# Patient Record
Sex: MALE | Race: WHITE | Employment: FULL TIME | URBAN - METROPOLITAN AREA
[De-identification: names, ages, dates, MRNs, and addresses within clinical notes are randomized per-mention and may not be internally consistent; named-entity substitution may affect disease eponyms.]

---

## 2018-07-11 ENCOUNTER — OFFICE VISIT (OUTPATIENT)
Dept: FAMILY MEDICINE CLINIC | Age: 48
End: 2018-07-11
Payer: COMMERCIAL

## 2018-07-11 ENCOUNTER — HOSPITAL ENCOUNTER (OUTPATIENT)
Age: 48
Setting detail: SPECIMEN
Discharge: HOME | End: 2018-07-11
Payer: COMMERCIAL

## 2018-07-11 VITALS
RESPIRATION RATE: 16 BRPM | TEMPERATURE: 98 F | WEIGHT: 255 LBS | DIASTOLIC BLOOD PRESSURE: 88 MMHG | HEIGHT: 70 IN | HEART RATE: 87 BPM | BODY MASS INDEX: 36.51 KG/M2 | SYSTOLIC BLOOD PRESSURE: 133 MMHG

## 2018-07-11 DIAGNOSIS — R63.4 WEIGHT LOSS: ICD-10-CM

## 2018-07-11 DIAGNOSIS — Z12.11 ENCOUNTER FOR FIT (FECAL IMMUNOCHEMICAL TEST) SCREENING: ICD-10-CM

## 2018-07-11 DIAGNOSIS — E11.8 TYPE 2 DIABETES MELLITUS WITH COMPLICATION, WITHOUT LONG-TERM CURRENT USE OF INSULIN (HCC): ICD-10-CM

## 2018-07-11 DIAGNOSIS — Z13.220 LIPID SCREENING: Primary | ICD-10-CM

## 2018-07-11 DIAGNOSIS — Z12.11 ENCOUNTER FOR SCREENING COLONOSCOPY: ICD-10-CM

## 2018-07-11 DIAGNOSIS — R42 DIZZINESS: ICD-10-CM

## 2018-07-11 DIAGNOSIS — R07.9 CHEST PAIN, UNSPECIFIED TYPE: ICD-10-CM

## 2018-07-11 LAB
BILIRUBIN, POC: NEGATIVE
BLOOD URINE, POC: NEGATIVE
CLARITY, POC: ABNORMAL
COLOR, POC: YELLOW
CREATININE URINE: 73.4 MG/DL (ref 39–259)
GLUCOSE URINE, POC: 500
HBA1C MFR BLD: 13.3 %
KETONES, POC: NEGATIVE
LEUKOCYTE EST, POC: NEGATIVE
MICROALBUMIN/CREAT 24H UR: <12 MG/L
MICROALBUMIN/CREAT UR-RTO: NORMAL MCG/MG CREAT
NITRITE, POC: NEGATIVE
PH, POC: 5.5
PROTEIN, POC: NEGATIVE
SPECIFIC GRAVITY, POC: 1.02
UROBILINOGEN, POC: 0.2

## 2018-07-11 PROCEDURE — 99205 OFFICE O/P NEW HI 60 MIN: CPT | Performed by: PHYSICIAN ASSISTANT

## 2018-07-11 PROCEDURE — 83036 HEMOGLOBIN GLYCOSYLATED A1C: CPT | Performed by: PHYSICIAN ASSISTANT

## 2018-07-11 PROCEDURE — 81002 URINALYSIS NONAUTO W/O SCOPE: CPT | Performed by: PHYSICIAN ASSISTANT

## 2018-07-11 PROCEDURE — G0444 DEPRESSION SCREEN ANNUAL: HCPCS | Performed by: PHYSICIAN ASSISTANT

## 2018-07-11 PROCEDURE — 93000 ELECTROCARDIOGRAM COMPLETE: CPT | Performed by: PHYSICIAN ASSISTANT

## 2018-07-11 RX ORDER — METFORMIN HYDROCHLORIDE 500 MG/1
500 TABLET, EXTENDED RELEASE ORAL
Qty: 60 TABLET | Refills: 11 | Status: SHIPPED | OUTPATIENT
Start: 2018-07-11 | End: 2018-10-15 | Stop reason: SDUPTHER

## 2018-07-11 RX ORDER — LISINOPRIL 10 MG/1
10 TABLET ORAL DAILY
Qty: 30 TABLET | Refills: 0 | Status: SHIPPED | OUTPATIENT
Start: 2018-07-11 | End: 2018-08-13 | Stop reason: SDUPTHER

## 2018-07-11 RX ORDER — ATORVASTATIN CALCIUM 10 MG/1
10 TABLET, FILM COATED ORAL DAILY
Qty: 30 TABLET | Refills: 3 | Status: SHIPPED | OUTPATIENT
Start: 2018-07-11 | End: 2018-10-15 | Stop reason: SDUPTHER

## 2018-07-11 ASSESSMENT — PATIENT HEALTH QUESTIONNAIRE - PHQ9
8. MOVING OR SPEAKING SO SLOWLY THAT OTHER PEOPLE COULD HAVE NOTICED. OR THE OPPOSITE, BEING SO FIGETY OR RESTLESS THAT YOU HAVE BEEN MOVING AROUND A LOT MORE THAN USUAL: 0
10. IF YOU CHECKED OFF ANY PROBLEMS, HOW DIFFICULT HAVE THESE PROBLEMS MADE IT FOR YOU TO DO YOUR WORK, TAKE CARE OF THINGS AT HOME, OR GET ALONG WITH OTHER PEOPLE: 0
SUM OF ALL RESPONSES TO PHQ QUESTIONS 1-9: 5
2. FEELING DOWN, DEPRESSED OR HOPELESS: 1
6. FEELING BAD ABOUT YOURSELF - OR THAT YOU ARE A FAILURE OR HAVE LET YOURSELF OR YOUR FAMILY DOWN: 0
4. FEELING TIRED OR HAVING LITTLE ENERGY: 0
9. THOUGHTS THAT YOU WOULD BE BETTER OFF DEAD, OR OF HURTING YOURSELF: 0
1. LITTLE INTEREST OR PLEASURE IN DOING THINGS: 1
3. TROUBLE FALLING OR STAYING ASLEEP: 1
7. TROUBLE CONCENTRATING ON THINGS, SUCH AS READING THE NEWSPAPER OR WATCHING TELEVISION: 1
SUM OF ALL RESPONSES TO PHQ9 QUESTIONS 1 & 2: 2
5. POOR APPETITE OR OVEREATING: 1

## 2018-07-12 ENCOUNTER — HOSPITAL ENCOUNTER (OUTPATIENT)
Dept: NUCLEAR MEDICINE | Age: 48
Discharge: HOME | End: 2018-07-14
Payer: COMMERCIAL

## 2018-07-12 ENCOUNTER — HOSPITAL ENCOUNTER (OUTPATIENT)
Dept: NON INVASIVE DIAGNOSTICS | Age: 48
Discharge: HOME | End: 2018-07-12
Payer: COMMERCIAL

## 2018-07-12 ENCOUNTER — HOSPITAL ENCOUNTER (OUTPATIENT)
Age: 48
Discharge: HOME | End: 2018-07-12
Payer: COMMERCIAL

## 2018-07-12 VITALS — DIASTOLIC BLOOD PRESSURE: 87 MMHG | SYSTOLIC BLOOD PRESSURE: 133 MMHG | RESPIRATION RATE: 16 BRPM | HEART RATE: 87 BPM

## 2018-07-12 DIAGNOSIS — E11.8 TYPE 2 DIABETES MELLITUS WITH COMPLICATION, WITHOUT LONG-TERM CURRENT USE OF INSULIN (HCC): ICD-10-CM

## 2018-07-12 DIAGNOSIS — R42 DIZZINESS: ICD-10-CM

## 2018-07-12 DIAGNOSIS — Z13.220 LIPID SCREENING: ICD-10-CM

## 2018-07-12 DIAGNOSIS — R63.4 WEIGHT LOSS: ICD-10-CM

## 2018-07-12 DIAGNOSIS — R07.9 CHEST PAIN, UNSPECIFIED TYPE: ICD-10-CM

## 2018-07-12 LAB
ALBUMIN SERPL-MCNC: 3.8 G/DL (ref 3.5–5.2)
ALBUMIN/GLOBULIN RATIO: ABNORMAL (ref 1–2.5)
ALP BLD-CCNC: 91 U/L (ref 40–129)
ALT SERPL-CCNC: 34 U/L (ref 5–41)
ANION GAP SERPL CALCULATED.3IONS-SCNC: 11 MMOL/L (ref 9–17)
AST SERPL-CCNC: 21 U/L
BILIRUB SERPL-MCNC: 0.62 MG/DL (ref 0.3–1.2)
BUN BLDV-MCNC: 10 MG/DL (ref 6–20)
BUN/CREAT BLD: 13 (ref 9–20)
CALCIUM SERPL-MCNC: 8.8 MG/DL (ref 8.6–10.4)
CHLORIDE BLD-SCNC: 100 MMOL/L (ref 98–107)
CHOLESTEROL/HDL RATIO: 7
CHOLESTEROL: 223 MG/DL
CO2: 26 MMOL/L (ref 20–31)
CREAT SERPL-MCNC: 0.78 MG/DL (ref 0.7–1.2)
GFR AFRICAN AMERICAN: >60 ML/MIN
GFR NON-AFRICAN AMERICAN: >60 ML/MIN
GFR SERPL CREATININE-BSD FRML MDRD: ABNORMAL ML/MIN/{1.73_M2}
GFR SERPL CREATININE-BSD FRML MDRD: ABNORMAL ML/MIN/{1.73_M2}
GLUCOSE BLD-MCNC: 300 MG/DL (ref 70–99)
HCT VFR BLD CALC: 42.6 % (ref 41–53)
HDLC SERPL-MCNC: 32 MG/DL
HEMOGLOBIN: 14.5 G/DL (ref 13.5–17.5)
LDL CHOLESTEROL: 119 MG/DL (ref 0–130)
MCH RBC QN AUTO: 33.3 PG (ref 26–34)
MCHC RBC AUTO-ENTMCNC: 34 G/DL (ref 31–37)
MCV RBC AUTO: 98 FL (ref 80–100)
NRBC AUTOMATED: ABNORMAL PER 100 WBC
PDW BLD-RTO: 12.6 % (ref 11.5–14.5)
PLATELET # BLD: 210 K/UL (ref 130–400)
PMV BLD AUTO: 9.6 FL (ref 6–12)
POTASSIUM SERPL-SCNC: 4.6 MMOL/L (ref 3.7–5.3)
RBC # BLD: 4.35 M/UL (ref 4.5–5.9)
SODIUM BLD-SCNC: 137 MMOL/L (ref 135–144)
TOTAL PROTEIN: 7.2 G/DL (ref 6.4–8.3)
TRIGL SERPL-MCNC: 360 MG/DL
TSH SERPL DL<=0.05 MIU/L-ACNC: 1.49 MIU/L (ref 0.3–5)
VLDLC SERPL CALC-MCNC: ABNORMAL MG/DL (ref 1–30)
WBC # BLD: 10.2 K/UL (ref 3.5–11)

## 2018-07-12 PROCEDURE — 93017 CV STRESS TEST TRACING ONLY: CPT

## 2018-07-12 PROCEDURE — 80053 COMPREHEN METABOLIC PANEL: CPT

## 2018-07-12 PROCEDURE — 78452 HT MUSCLE IMAGE SPECT MULT: CPT

## 2018-07-12 PROCEDURE — 84443 ASSAY THYROID STIM HORMONE: CPT

## 2018-07-12 PROCEDURE — 36415 COLL VENOUS BLD VENIPUNCTURE: CPT

## 2018-07-12 PROCEDURE — 3430000000 HC RX DIAGNOSTIC RADIOPHARMACEUTICAL: Performed by: PHYSICIAN ASSISTANT

## 2018-07-12 PROCEDURE — 6360000002 HC RX W HCPCS: Performed by: PHYSICIAN ASSISTANT

## 2018-07-12 PROCEDURE — 85027 COMPLETE CBC AUTOMATED: CPT

## 2018-07-12 PROCEDURE — 2580000003 HC RX 258: Performed by: PHYSICIAN ASSISTANT

## 2018-07-12 PROCEDURE — A9500 TC99M SESTAMIBI: HCPCS | Performed by: PHYSICIAN ASSISTANT

## 2018-07-12 PROCEDURE — 80061 LIPID PANEL: CPT

## 2018-07-12 RX ORDER — AMINOPHYLLINE DIHYDRATE 25 MG/ML
100 INJECTION, SOLUTION INTRAVENOUS
Status: ACTIVE | OUTPATIENT
Start: 2018-07-12 | End: 2018-07-12

## 2018-07-12 RX ORDER — SODIUM CHLORIDE 0.9 % (FLUSH) 0.9 %
10 SYRINGE (ML) INJECTION PRN
Status: DISCONTINUED | OUTPATIENT
Start: 2018-07-12 | End: 2018-07-13 | Stop reason: HOSPADM

## 2018-07-12 RX ORDER — 0.9 % SODIUM CHLORIDE 0.9 %
250 INTRAVENOUS SOLUTION INTRAVENOUS ONCE
Status: DISCONTINUED | OUTPATIENT
Start: 2018-07-12 | End: 2018-07-13 | Stop reason: HOSPADM

## 2018-07-12 RX ORDER — METOPROLOL TARTRATE 5 MG/5ML
2.5 INJECTION INTRAVENOUS PRN
Status: DISCONTINUED | OUTPATIENT
Start: 2018-07-12 | End: 2018-07-13 | Stop reason: HOSPADM

## 2018-07-12 RX ORDER — NITROGLYCERIN 0.4 MG/1
0.4 TABLET SUBLINGUAL EVERY 5 MIN PRN
Status: DISCONTINUED | OUTPATIENT
Start: 2018-07-12 | End: 2018-07-13 | Stop reason: HOSPADM

## 2018-07-12 RX ADMIN — TETRAKIS(2-METHOXYISOBUTYLISOCYANIDE)COPPER(I) TETRAFLUOROBORATE 33.6 MILLICURIE: 1 INJECTION, POWDER, LYOPHILIZED, FOR SOLUTION INTRAVENOUS at 08:30

## 2018-07-12 RX ADMIN — REGADENOSON 0.4 MG: 0.08 INJECTION, SOLUTION INTRAVENOUS at 08:28

## 2018-07-12 RX ADMIN — Medication 10 ML: at 08:29

## 2018-07-12 NOTE — PROCEDURES
[address]                   [address] [facility], [facility]0 [facility]                                CARDIAC STRESS TEST    PATIENT NAME: Camacho Noguera                  :        1970  MED REC NO:   [id number]                             ROOM:  ACCOUNT NO:   [de-identified]                           ADMIT DATE: 2018  PROVIDER:     Amy Pollard    DATE OF STUDY:  2018    PRIMARY CARE PROVIDER:  DEVYN Monzon    PERFORMING PHYSICIAN: Amy Pollard MD    [facility] STRESS TEST    INDICATION:  Chest pain    100% max predicted heart rate:  170%  85% max predicted heart rate:  145%  Resting heart rate:  86  Maximum heart rate achieved:  110  Duration:  1:00  Resting BP:  133/87  Peak BP:  133/87  Peak Double Product: N/A  % of Age Predicted Maximum:  64%  METS:  1.0  Medications Given: 0.4 mg Lexiscan  Reason for Termination:  Medication Infusion Complete. During the stress the patient reported:  No symptoms. Baseline EKG demonstrated:  Sinus rhythm. Stress EKG Changes Demonstrated:  No abnormal change. ECG IMPRESSION:  Negative.         KIMBERLEY MILNER    D: 2018 11:57:40       T: 2018 12:06:48     SA/DEB_EDIT  Job#: [id number]     Doc#: Unknown

## 2018-07-13 ENCOUNTER — HOSPITAL ENCOUNTER (OUTPATIENT)
Dept: NON INVASIVE DIAGNOSTICS | Age: 48
Discharge: HOME | End: 2018-07-13
Payer: COMMERCIAL

## 2018-07-13 DIAGNOSIS — R07.9 CHEST PAIN, UNSPECIFIED TYPE: ICD-10-CM

## 2018-07-13 DIAGNOSIS — R42 DIZZINESS: ICD-10-CM

## 2018-07-13 LAB
LV EF: 46 %
LV EF: 53 %
LVEF MODALITY: NORMAL
LVEF MODALITY: NORMAL

## 2018-07-13 PROCEDURE — 93306 TTE W/DOPPLER COMPLETE: CPT

## 2018-07-13 PROCEDURE — 3430000000 HC RX DIAGNOSTIC RADIOPHARMACEUTICAL: Performed by: PHYSICIAN ASSISTANT

## 2018-07-13 PROCEDURE — A9500 TC99M SESTAMIBI: HCPCS | Performed by: PHYSICIAN ASSISTANT

## 2018-07-13 RX ORDER — GLUCOSAMINE HCL/CHONDROITIN SU 500-400 MG
CAPSULE ORAL
Qty: 100 STRIP | Refills: 0 | Status: SHIPPED | OUTPATIENT
Start: 2018-07-13 | End: 2018-10-15

## 2018-07-13 RX ORDER — LANCETS 30 GAUGE
EACH MISCELLANEOUS
Qty: 100 EACH | Refills: 0 | Status: SHIPPED | OUTPATIENT
Start: 2018-07-13 | End: 2018-10-15

## 2018-07-13 RX ORDER — GLIPIZIDE 5 MG/1
5 TABLET ORAL 2 TIMES DAILY
Qty: 60 TABLET | Refills: 3 | Status: SHIPPED | OUTPATIENT
Start: 2018-07-13 | End: 2018-10-15 | Stop reason: SDUPTHER

## 2018-07-13 RX ADMIN — TETRAKIS(2-METHOXYISOBUTYLISOCYANIDE)COPPER(I) TETRAFLUOROBORATE 31 MILLICURIE: 1 INJECTION, POWDER, LYOPHILIZED, FOR SOLUTION INTRAVENOUS at 09:00

## 2018-07-13 ASSESSMENT — ENCOUNTER SYMPTOMS
NAUSEA: 0
CONSTIPATION: 0
ABDOMINAL PAIN: 0
BLOOD IN STOOL: 0
CHOKING: 0
VOMITING: 0
ABDOMINAL DISTENTION: 0
EYE REDNESS: 0
STRIDOR: 0
SINUS PRESSURE: 0
PHOTOPHOBIA: 0
BACK PAIN: 0
EYE PAIN: 0
SHORTNESS OF BREATH: 1
WHEEZING: 0
CHEST TIGHTNESS: 0
COLOR CHANGE: 0
DIARRHEA: 0
COUGH: 1
APNEA: 0
SORE THROAT: 0

## 2018-07-13 NOTE — PROGRESS NOTES
TONSILLECTOMY         ______________________________________________________________________  Health Maintenance Due   Topic Date Due    Diabetic foot exam  01/26/1978    Diabetic retinal exam  01/26/1978    HIV screen  01/26/1983    DTaP/Tdap/Td vaccine (1 - Tdap) 01/26/1987    Pneumococcal med risk (1 of 1 - PPSV23) 01/26/1987    Shingles Vaccine (1 of 2 - 2 Dose Series) 01/26/2018    Colon cancer screen colonoscopy  01/26/2018       Allergies   Allergen Reactions    Toradol [Ketorolac Tromethamine] Shortness Of Breath         Current Outpatient Prescriptions   Medication Sig Dispense Refill    Lancets MISC Use one Lancet per blood sugar test 100 each 0    blood glucose monitor strips Test 3 times a day & as needed for symptoms of irregular blood glucose. 100 strip 0    glipiZIDE (GLUCOTROL) 5 MG tablet Take 1 tablet by mouth 2 times daily 60 tablet 3    lisinopril (PRINIVIL) 10 MG tablet Take 1 tablet by mouth daily for 30 doses 30 tablet 0    metFORMIN (GLUCOPHAGE-XR) 500 MG extended release tablet Take 1 tablet by mouth 2 times daily (before meals) Take 500 mg a day for 2 weeks, then increase the dose to twice a day. 60 tablet 11    atorvastatin (LIPITOR) 10 MG tablet Take 1 tablet by mouth daily 30 tablet 3     No current facility-administered medications for this visit. Social History   Substance Use Topics    Smoking status: Current Every Day Smoker     Packs/day: 1.00     Types: Cigarettes    Smokeless tobacco: Former User    Alcohol use Yes       History reviewed. No pertinent family history. ______________________________________________________________________  Review of Systems   Constitutional: Positive for fatigue (Generalized fatigue without, muscle weakness) and unexpected weight change (Approximately 60 pounds over the last year without effort). Negative for activity change, appetite change, chills, diaphoresis and fever. HENT: Negative.   Negative for congestion, hearing loss, sinus pressure and sore throat. Eyes: Negative for photophobia, pain, redness and visual disturbance. Respiratory: Positive for cough (Chronic productive cough in the morning of green-brown sputum) and shortness of breath. Negative for apnea, choking, chest tightness, wheezing and stridor. Cardiovascular: Positive for chest pain ( intermittent chest pressure). Negative for palpitations and leg swelling. Gastrointestinal: Negative for abdominal distention, abdominal pain, blood in stool, constipation, diarrhea, nausea and vomiting. Endocrine: Negative. Genitourinary: Negative for decreased urine volume, difficulty urinating, dysuria, flank pain, frequency, hematuria and urgency. Musculoskeletal: Negative for back pain and gait problem. Skin: Negative for color change, pallor, rash and wound. Neurological: Positive for dizziness (Intermittent episodes of dizziness without syncopal episode) and headaches (Intermittent generalized headaches without focal deficits). Negative for tremors, syncope, facial asymmetry, speech difficulty, weakness, light-headedness and numbness. Hematological: Negative for adenopathy. Psychiatric/Behavioral: Negative for agitation, confusion and sleep disturbance. The patient is not nervous/anxious. ______________________________________________________________________  Physical Exam   Constitutional: He is oriented to person, place, and time. Vital signs are normal. He appears well-developed and well-nourished. No distress. HENT:   Head: Normocephalic and atraumatic. Right Ear: External ear normal.   Left Ear: External ear normal.   Nose: Nose normal.   Mouth/Throat: Oropharynx is clear and moist. No oropharyngeal exudate. Eyes: Conjunctivae and EOM are normal. Right eye exhibits no discharge. Left eye exhibits no discharge. No scleral icterus. Neck: Normal range of motion. Neck supple. No JVD present. No tracheal deviation present.  No thyromegaly performed in visit on 07/11/18   POCT glycosylated hemoglobin (Hb A1C)   Result Value Ref Range    Hemoglobin A1C 13.3 %   POCT Urinalysis no Micro   Result Value Ref Range    Color, UA Yellow     Clarity, UA Slightly Cloudy     Glucose, UA      Bilirubin, UA Negative     Ketones, UA Negative     Spec Grav, UA 1.020     Blood, UA POC Negative     pH, UA 5.5     Protein, UA POC Negative     Urobilinogen, UA 0.2     Leukocytes, UA Negative     Nitrite, UA Negative        ______________________________________________________________________  Assessment and Plan:  Barry Doan was seen today for establish care and diabetes. Diagnoses and all orders for this visit:    Lipid screening  -     Lipid Panel; Future  -     Cancel: Stress Test W Pharm; Future  -     Echocardiogram complete; Future  -     [address]; Future    Encounter for FIT (fecal immunochemical test) screening  -     POCT Fecal Immunochemical Test (FIT); Future  -     Kishor Jordan MD, [facility]*    Chest pain, unspecified type  -     Lipid Panel; Future  -     POCT glycosylated hemoglobin (Hb A1C)  -     EKG 12 lead  -     CBC; Future  -     Comprehensive Metabolic Panel; Future  -     Cancel: Stress Test W Pharm; Future  -     Echocardiogram complete; Future  -     [facility] [address] [place] [address]; Future    Type 2 diabetes mellitus with complication, without long-term current use of insulin (HCC)  -     POCT Fecal Immunochemical Test (FIT); Future  -     Lipid Panel; Future  -     POCT glycosylated hemoglobin (Hb A1C)  -     POCT Urinalysis no Micro  -     Microalbumin, Ur  -     CBC; Future  -     Comprehensive Metabolic Panel; Future  -     Cancel: Stress Test W Pharm; Future  -     Echocardiogram complete; Future  -     [address];  Future  -     DME Order for Glucometer as ANDREA Casillas [facility]  7/13/2018, 11:49 AM    This note is created with the assistance of a speech-recognition program. While intending to generate a document that actually reflects the content of the visit, the document can still have some mistakes which may not have been identified and corrected by editing.

## 2018-07-16 ENCOUNTER — TELEPHONE (OUTPATIENT)
Dept: FAMILY MEDICINE CLINIC | Age: 48
End: 2018-07-16

## 2018-07-20 ENCOUNTER — TELEPHONE (OUTPATIENT)
Dept: FAMILY MEDICINE CLINIC | Age: 48
End: 2018-07-20

## 2018-07-20 NOTE — TELEPHONE ENCOUNTER
Patient called in with Blood sugar readings all Morning readings. Please advise thank you! 7/16/18    219   7/17/18    193  7/18/18    151  7/19/18    258    7/20/18    134      Next Visit Date:    Last ov 7/11/18    No future appointments. Health Maintenance   Topic Date Due    Diabetic foot exam  01/26/1978    Diabetic retinal exam  01/26/1978    HIV screen  01/26/1983    DTaP/Tdap/Td vaccine (1 - Tdap) 01/26/1987    Pneumococcal med risk (1 of 1 - PPSV23) 01/26/1987    Shingles Vaccine (1 of 2 - 2 Dose Series) 01/26/2018    Colon cancer screen colonoscopy  01/26/2018    Flu vaccine (1) 09/01/2018    A1C test (Diabetic or Prediabetic)  10/11/2018    Diabetic microalbuminuria test  07/11/2019    Lipid screen  07/12/2019    Potassium monitoring  07/12/2019    Creatinine monitoring  07/12/2019       Hemoglobin A1C (%)   Date Value   07/11/2018 13.3             ( goal A1C is < 7)   Microalb/Crt.  Ratio (mcg/mg creat)   Date Value   07/11/2018 CANNOT BE CALCULATED     LDL Cholesterol (mg/dL)   Date Value   07/12/2018 119       (goal LDL is <100)   AST (U/L)   Date Value   07/12/2018 21     ALT (U/L)   Date Value   07/12/2018 34     BUN (mg/dL)   Date Value   07/12/2018 10     BP Readings from Last 3 Encounters:   07/12/18 133/87   07/11/18 133/88          (goal 120/80)    All Future Testing planned in [facility]  Lab Frequency Next Occurrence   POCT Fecal Immunochemical Test (FIT) Once 07/25/2018   Stress Test W Pharm Once 07/11/2018               Patient Active Problem List:     Type 2 diabetes mellitus with complication, without long-term current use of insulin (HCC)     Chest pain     Lipid screening

## 2018-08-10 PROBLEM — Z13.220 LIPID SCREENING: Status: RESOLVED | Noted: 2018-07-11 | Resolved: 2018-08-10

## 2018-08-13 RX ORDER — LISINOPRIL 10 MG/1
10 TABLET ORAL DAILY
Qty: 30 TABLET | Refills: 0 | Status: SHIPPED | OUTPATIENT
Start: 2018-08-13 | End: 2018-09-11 | Stop reason: SDUPTHER

## 2018-08-15 ENCOUNTER — TELEPHONE (OUTPATIENT)
Dept: FAMILY MEDICINE CLINIC | Age: 48
End: 2018-08-15

## 2018-08-15 DIAGNOSIS — E11.8 TYPE 2 DIABETES MELLITUS WITH COMPLICATION, WITHOUT LONG-TERM CURRENT USE OF INSULIN (HCC): ICD-10-CM

## 2018-08-15 DIAGNOSIS — Z12.11 ENCOUNTER FOR FIT (FECAL IMMUNOCHEMICAL TEST) SCREENING: ICD-10-CM

## 2018-08-15 DIAGNOSIS — R19.5 POSITIVE FIT (FECAL IMMUNOCHEMICAL TEST): Primary | ICD-10-CM

## 2018-08-15 LAB
CONTROL: PRESENT
HEMOCCULT STL QL: POSITIVE

## 2018-08-15 PROCEDURE — 82274 ASSAY TEST FOR BLOOD FECAL: CPT | Performed by: PHYSICIAN ASSISTANT

## 2018-09-11 NOTE — TELEPHONE ENCOUNTER
Received [facility] request for refill of patient's  medication. Medication pended for physician review, please advise. Thank you! Last visit:7/11/2018  Last Med refill:8/13/2018    Next Visit Date:  Future Appointments  Date Time Provider John Gordon   10/15/2018 10:00 AM ANDREA [facility] [address] Maintenance   Topic Date Due    Diabetic foot exam  01/26/1978    Diabetic retinal exam  01/26/1978    HIV screen  01/26/1983    DTaP/Tdap/Td vaccine (1 - Tdap) 01/26/1987    Pneumococcal med risk (1 of 1 - PPSV23) 01/26/1987    Shingles Vaccine (1 of 2 - 2 Dose Series) 01/26/2018    Flu vaccine (1) 09/01/2018    A1C test (Diabetic or Prediabetic)  10/11/2018    Diabetic microalbuminuria test  07/11/2019    Lipid screen  07/12/2019    Potassium monitoring  07/12/2019    Creatinine monitoring  07/12/2019    Colon Cancer Screen FIT/FOBT  08/15/2019       Hemoglobin A1C (%)   Date Value   07/11/2018 13.3             ( goal A1C is < 7)   Microalb/Crt.  Ratio (mcg/mg creat)   Date Value   07/11/2018 CANNOT BE CALCULATED     LDL Cholesterol (mg/dL)   Date Value   07/12/2018 119       (goal LDL is <100)   AST (U/L)   Date Value   07/12/2018 21     ALT (U/L)   Date Value   07/12/2018 34     BUN (mg/dL)   Date Value   07/12/2018 10     BP Readings from Last 3 Encounters:   07/12/18 133/87   07/11/18 133/88          (goal 120/80)    All Future Testing planned in [facility]  Lab Frequency Next Occurrence   Stress Test W Pharm Once 07/11/2018               Patient Active Problem List:     Type 2 diabetes mellitus with complication, without long-term current use of insulin (HCC)     Chest pain

## 2018-09-12 RX ORDER — LISINOPRIL 10 MG/1
TABLET ORAL
Qty: 30 TABLET | Refills: 0 | Status: SHIPPED | OUTPATIENT
Start: 2018-09-12 | End: 2018-10-15 | Stop reason: SDUPTHER

## 2018-10-15 ENCOUNTER — OFFICE VISIT (OUTPATIENT)
Dept: FAMILY MEDICINE CLINIC | Age: 48
End: 2018-10-15
Payer: COMMERCIAL

## 2018-10-15 VITALS
RESPIRATION RATE: 16 BRPM | TEMPERATURE: 98.1 F | WEIGHT: 252.8 LBS | BODY MASS INDEX: 36.19 KG/M2 | HEIGHT: 70 IN | SYSTOLIC BLOOD PRESSURE: 115 MMHG | DIASTOLIC BLOOD PRESSURE: 73 MMHG | HEART RATE: 92 BPM

## 2018-10-15 DIAGNOSIS — I10 ESSENTIAL HYPERTENSION: ICD-10-CM

## 2018-10-15 DIAGNOSIS — E11.8 TYPE 2 DIABETES MELLITUS WITH COMPLICATION, WITHOUT LONG-TERM CURRENT USE OF INSULIN (HCC): Primary | ICD-10-CM

## 2018-10-15 DIAGNOSIS — E78.1 HYPERTRIGLYCERIDEMIA: ICD-10-CM

## 2018-10-15 LAB — HBA1C MFR BLD: 6.2 %

## 2018-10-15 PROCEDURE — 83036 HEMOGLOBIN GLYCOSYLATED A1C: CPT | Performed by: PHYSICIAN ASSISTANT

## 2018-10-15 PROCEDURE — 99214 OFFICE O/P EST MOD 30 MIN: CPT | Performed by: PHYSICIAN ASSISTANT

## 2018-10-15 RX ORDER — LISINOPRIL 10 MG/1
TABLET ORAL
Qty: 30 TABLET | Refills: 3 | Status: SHIPPED | OUTPATIENT
Start: 2018-10-15 | End: 2019-01-28 | Stop reason: SDUPTHER

## 2018-10-15 RX ORDER — GLIPIZIDE 5 MG/1
5 TABLET ORAL 2 TIMES DAILY
Qty: 60 TABLET | Refills: 3 | Status: SHIPPED | OUTPATIENT
Start: 2018-10-15 | End: 2019-01-28 | Stop reason: SDUPTHER

## 2018-10-15 RX ORDER — METFORMIN HYDROCHLORIDE 500 MG/1
500 TABLET, EXTENDED RELEASE ORAL
Qty: 60 TABLET | Refills: 11 | Status: SHIPPED | OUTPATIENT
Start: 2018-10-15 | End: 2019-01-28 | Stop reason: SDUPTHER

## 2018-10-15 RX ORDER — ATORVASTATIN CALCIUM 10 MG/1
10 TABLET, FILM COATED ORAL DAILY
Qty: 30 TABLET | Refills: 3 | Status: SHIPPED | OUTPATIENT
Start: 2018-10-15 | End: 2019-01-28 | Stop reason: SDUPTHER

## 2018-10-15 ASSESSMENT — ENCOUNTER SYMPTOMS
COLOR CHANGE: 0
SINUS PRESSURE: 0
ABDOMINAL PAIN: 0
CONSTIPATION: 0
SHORTNESS OF BREATH: 0
VOMITING: 0
NAUSEA: 0
SORE THROAT: 0
BACK PAIN: 0
WHEEZING: 0
EYE REDNESS: 0
BLOOD IN STOOL: 0
DIARRHEA: 0
COUGH: 0
CHEST TIGHTNESS: 0
ABDOMINAL DISTENTION: 0
PHOTOPHOBIA: 0

## 2018-10-15 NOTE — PROGRESS NOTES
Potassium monitoring  07/12/2019    Creatinine monitoring  07/12/2019    Colon Cancer Screen FIT/FOBT  08/15/2019

## 2019-01-28 ENCOUNTER — OFFICE VISIT (OUTPATIENT)
Dept: FAMILY MEDICINE CLINIC | Age: 49
End: 2019-01-28
Payer: COMMERCIAL

## 2019-01-28 VITALS
HEART RATE: 90 BPM | BODY MASS INDEX: 38.14 KG/M2 | TEMPERATURE: 98 F | WEIGHT: 266.4 LBS | HEIGHT: 70 IN | SYSTOLIC BLOOD PRESSURE: 114 MMHG | DIASTOLIC BLOOD PRESSURE: 73 MMHG | RESPIRATION RATE: 16 BRPM

## 2019-01-28 DIAGNOSIS — E11.8 TYPE 2 DIABETES MELLITUS WITH COMPLICATION, WITHOUT LONG-TERM CURRENT USE OF INSULIN (HCC): Primary | ICD-10-CM

## 2019-01-28 DIAGNOSIS — I10 ESSENTIAL HYPERTENSION: ICD-10-CM

## 2019-01-28 DIAGNOSIS — E78.1 HYPERTRIGLYCERIDEMIA: ICD-10-CM

## 2019-01-28 DIAGNOSIS — Z12.11 ENCOUNTER FOR SCREENING COLONOSCOPY: ICD-10-CM

## 2019-01-28 PROCEDURE — 99214 OFFICE O/P EST MOD 30 MIN: CPT | Performed by: PHYSICIAN ASSISTANT

## 2019-01-28 RX ORDER — ATORVASTATIN CALCIUM 10 MG/1
10 TABLET, FILM COATED ORAL DAILY
Qty: 30 TABLET | Refills: 2 | Status: SHIPPED | OUTPATIENT
Start: 2019-01-28 | End: 2019-06-15 | Stop reason: SDUPTHER

## 2019-01-28 RX ORDER — GLIPIZIDE 5 MG/1
5 TABLET ORAL 2 TIMES DAILY
Qty: 60 TABLET | Refills: 2 | Status: SHIPPED | OUTPATIENT
Start: 2019-01-28 | End: 2019-06-15 | Stop reason: SDUPTHER

## 2019-01-28 RX ORDER — METFORMIN HYDROCHLORIDE 500 MG/1
500 TABLET, EXTENDED RELEASE ORAL
Qty: 60 TABLET | Refills: 2 | Status: SHIPPED | OUTPATIENT
Start: 2019-01-28 | End: 2019-06-28 | Stop reason: SDUPTHER

## 2019-01-28 RX ORDER — LISINOPRIL 10 MG/1
TABLET ORAL
Qty: 30 TABLET | Refills: 2 | Status: SHIPPED | OUTPATIENT
Start: 2019-01-28 | End: 2019-06-15 | Stop reason: SDUPTHER

## 2019-01-28 ASSESSMENT — ENCOUNTER SYMPTOMS
EYE REDNESS: 0
BACK PAIN: 0
CONSTIPATION: 0
COLOR CHANGE: 0
SHORTNESS OF BREATH: 0
CHEST TIGHTNESS: 0
COUGH: 0
RECTAL PAIN: 0
ABDOMINAL PAIN: 0
VOMITING: 0
SORE THROAT: 0
SINUS PRESSURE: 0
ANAL BLEEDING: 0
ABDOMINAL DISTENTION: 0
WHEEZING: 0
NAUSEA: 0
BLOOD IN STOOL: 0
PHOTOPHOBIA: 0
DIARRHEA: 0

## 2019-05-24 ENCOUNTER — OFFICE VISIT (OUTPATIENT)
Dept: FAMILY MEDICINE CLINIC | Age: 49
End: 2019-05-24
Payer: COMMERCIAL

## 2019-05-24 VITALS
TEMPERATURE: 97.9 F | RESPIRATION RATE: 16 BRPM | DIASTOLIC BLOOD PRESSURE: 83 MMHG | HEART RATE: 87 BPM | WEIGHT: 273.4 LBS | BODY MASS INDEX: 39.14 KG/M2 | SYSTOLIC BLOOD PRESSURE: 127 MMHG | HEIGHT: 70 IN

## 2019-05-24 DIAGNOSIS — Z13.220 LIPID SCREENING: ICD-10-CM

## 2019-05-24 DIAGNOSIS — E78.1 HYPERTRIGLYCERIDEMIA: ICD-10-CM

## 2019-05-24 DIAGNOSIS — Z23 NEED FOR HEPATITIS B BOOSTER VACCINATION: ICD-10-CM

## 2019-05-24 DIAGNOSIS — R63.5 WEIGHT GAIN: ICD-10-CM

## 2019-05-24 DIAGNOSIS — I10 ESSENTIAL HYPERTENSION: ICD-10-CM

## 2019-05-24 DIAGNOSIS — Z23 NEED FOR 23-POLYVALENT PNEUMOCOCCAL POLYSACCHARIDE VACCINE: ICD-10-CM

## 2019-05-24 DIAGNOSIS — K21.9 GASTROESOPHAGEAL REFLUX DISEASE, ESOPHAGITIS PRESENCE NOT SPECIFIED: ICD-10-CM

## 2019-05-24 DIAGNOSIS — R19.5 POSITIVE FIT (FECAL IMMUNOCHEMICAL TEST): ICD-10-CM

## 2019-05-24 DIAGNOSIS — E11.8 TYPE 2 DIABETES MELLITUS WITH COMPLICATION, WITHOUT LONG-TERM CURRENT USE OF INSULIN (HCC): Primary | ICD-10-CM

## 2019-05-24 LAB — HBA1C MFR BLD: 6.3 %

## 2019-05-24 PROCEDURE — 90472 IMMUNIZATION ADMIN EACH ADD: CPT | Performed by: PHYSICIAN ASSISTANT

## 2019-05-24 PROCEDURE — 99214 OFFICE O/P EST MOD 30 MIN: CPT | Performed by: PHYSICIAN ASSISTANT

## 2019-05-24 PROCEDURE — 90746 HEPB VACCINE 3 DOSE ADULT IM: CPT | Performed by: PHYSICIAN ASSISTANT

## 2019-05-24 PROCEDURE — 90471 IMMUNIZATION ADMIN: CPT | Performed by: PHYSICIAN ASSISTANT

## 2019-05-24 PROCEDURE — 90732 PPSV23 VACC 2 YRS+ SUBQ/IM: CPT | Performed by: PHYSICIAN ASSISTANT

## 2019-05-24 PROCEDURE — 83036 HEMOGLOBIN GLYCOSYLATED A1C: CPT | Performed by: PHYSICIAN ASSISTANT

## 2019-05-24 RX ORDER — FENOFIBRATE 160 MG/1
160 TABLET ORAL DAILY
Qty: 90 TABLET | Refills: 1 | Status: SHIPPED | OUTPATIENT
Start: 2019-05-24 | End: 2019-09-05 | Stop reason: SDUPTHER

## 2019-05-24 RX ORDER — FAMOTIDINE 20 MG/1
20 TABLET, FILM COATED ORAL 2 TIMES DAILY
Qty: 60 TABLET | Refills: 3 | Status: SHIPPED | OUTPATIENT
Start: 2019-05-24 | End: 2019-11-26 | Stop reason: SDUPTHER

## 2019-05-24 ASSESSMENT — PATIENT HEALTH QUESTIONNAIRE - PHQ9
SUM OF ALL RESPONSES TO PHQ9 QUESTIONS 1 & 2: 0
2. FEELING DOWN, DEPRESSED OR HOPELESS: 0
SUM OF ALL RESPONSES TO PHQ QUESTIONS 1-9: 0
SUM OF ALL RESPONSES TO PHQ QUESTIONS 1-9: 0
1. LITTLE INTEREST OR PLEASURE IN DOING THINGS: 0

## 2019-05-24 NOTE — PROGRESS NOTES
Chronic Disease Visit Information    BP Readings from Last 3 Encounters:   01/28/19 114/73   10/15/18 115/73   07/12/18 133/87          Hemoglobin A1C (%)   Date Value   10/15/2018 6.2   07/11/2018 13.3     Microalb/Crt. Ratio (mcg/mg creat)   Date Value   07/11/2018 CANNOT BE CALCULATED     LDL Cholesterol (mg/dL)   Date Value   07/12/2018 119     HDL (mg/dL)   Date Value   07/12/2018 32 (L)     BUN (mg/dL)   Date Value   07/12/2018 10     CREATININE (mg/dL)   Date Value   07/12/2018 0.78     Glucose (mg/dL)   Date Value   07/12/2018 300 (H)            Have you changed or started any medications since your last visit including any over-the-counter medicines, vitamins, or herbal medicines? no   Are you having any side effects from any of your medications? -  no  Have you stopped taking any of your medications? Is so, why? -  no    Have you seen any other physician or provider since your last visit? No  Have you had any other diagnostic tests since your last visit? No  Have you been seen in the emergency room and/or had an admission to a hospital since we last saw you? No  Have you had your annual diabetic retinal (eye) exam? No  Have you had your routine dental cleaning in the past 6 months? no    Have you activated your [employer] account? If not, what are your barriers?  No:      Patient Care Team:  Clem Preston PA-C as PCP - General (Physician Assistant)         Medical History Review  Past Medical, Family, and Social History reviewed and does contribute to the patient presenting condition    Health Maintenance   Topic Date Due    Pneumococcal 0-64 years Vaccine (1 of 1 - PPSV23) 01/26/1974    Diabetic foot exam  01/26/1978    Diabetic retinal exam  01/26/1978    HIV screen  01/26/1983    Hepatitis B Vaccine (1 of 3 - Risk 3-dose series) 01/26/1987    DTaP/Tdap/Td vaccine (1 - Tdap) 02/05/2020 (Originally 1/26/1987)    Shingles Vaccine (1 of 2) 02/05/2020 (Originally 1/26/2018)    Flu vaccine (Season Ended) 02/12/2020 (Originally 9/1/2019)    Diabetic microalbuminuria test  07/11/2019    Lipid screen  07/12/2019    Potassium monitoring  07/12/2019    Creatinine monitoring  07/12/2019    Colon Cancer Screen FIT/FOBT  08/15/2019    A1C test (Diabetic or Prediabetic)  10/15/2019

## 2019-05-28 ASSESSMENT — ENCOUNTER SYMPTOMS
VOMITING: 0
CONSTIPATION: 0
ANAL BLEEDING: 0
COUGH: 0
DIARRHEA: 0
SHORTNESS OF BREATH: 0
WHEEZING: 0
ABDOMINAL PAIN: 0
BACK PAIN: 0
NAUSEA: 0
SINUS PAIN: 0
SORE THROAT: 0
CHEST TIGHTNESS: 0
BLOOD IN STOOL: 0
ABDOMINAL DISTENTION: 0

## 2019-05-28 NOTE — PROGRESS NOTES
[address] PRIMARY CARE  [address] [address]  Dept: [phone]  Dept Fax: [phone]    Office Progress Note  Date of patient's visit: 5/28/2019  Patient's Name:  Adriel Lance YOB: 1970            LUKE SHEPARD  ================================================================    REASON FOR VISIT/CHIEF COMPLAINT:  3 Month Follow-Up; Hypertension; and Diabetes    HISTORY OF PRESENTING ILLNESS:  History was obtained from: patient. Adriel Lance is a 44 y.o. is here for follow up for diabetes, hypertension,  Hypertriglyceridemia, GERD. Patient states overall he is doing well. He denies any new concerns or complaints. Patient is due for screening labs as well as colonoscopy. he has no new complaints today. He is due for medication refills.     Patient Active Problem List   Diagnosis    Type 2 diabetes mellitus with complication, without long-term current use of insulin ([facility] [address]    Chest pain    Essential hypertension    Hypertriglyceridemia       Health Maintenance Due   Topic Date Due    Pneumococcal 0-64 years Vaccine (1 of 1 - PPSV23) 01/26/1974    Diabetic foot exam  01/26/1978    Diabetic retinal exam  01/26/1978    HIV screen  01/26/1983    Hepatitis B Vaccine (1 of 3 - Risk 3-dose series) 01/26/1987       Allergies   Allergen Reactions    Toradol [Ketorolac Tromethamine] Shortness Of Breath         Current Outpatient Medications   Medication Sig Dispense Refill    fenofibrate 160 MG tablet Take 1 tablet by mouth daily 90 tablet 1    famotidine (PEPCID) 20 MG tablet Take 1 tablet by mouth 2 times daily 60 tablet 3    lisinopril (PRINIVIL;ZESTRIL) 10 MG tablet TAKE 1 TABLET BY MOUTH ONCE DAILY FOR 30 DOSES 30 tablet 2    glipiZIDE (GLUCOTROL) 5 MG tablet Take 1 tablet by mouth 2 times daily 60 tablet 2    metFORMIN (GLUCOPHAGE-XR) 500 MG extended release tablet Take 1 tablet by mouth 2 times daily (before meals) Take 500 mg a day for 2 weeks, then increase the dose to twice a day. 60 tablet 2    atorvastatin (LIPITOR) 10 MG tablet Take 1 tablet by mouth daily 30 tablet 2     No current facility-administered medications for this visit. Social History     Tobacco Use    Smoking status: Current Every Day Smoker     Packs/day: 1.00     Types: Cigarettes    Smokeless tobacco: Former User   Substance Use Topics    Alcohol use: Yes    Drug use: No       No family history on file. Review of Systems   Constitutional: Negative for appetite change, chills, diaphoresis, fatigue, fever and unexpected weight change. HENT: Negative for sinus pain and sore throat. Eyes: Negative for visual disturbance. Respiratory: Negative for cough, chest tightness, shortness of breath and wheezing. Cardiovascular: Negative for chest pain, palpitations and leg swelling. Gastrointestinal: Negative for abdominal distention, abdominal pain, anal bleeding, blood in stool, constipation, diarrhea, nausea and vomiting. Genitourinary: Negative for dysuria, frequency and urgency. Musculoskeletal: Negative for arthralgias, back pain, gait problem and myalgias. Neurological: Negative for dizziness, syncope, weakness and headaches. Psychiatric/Behavioral: Negative for agitation and sleep disturbance. The patient is not nervous/anxious. Physical Exam   Constitutional: He is oriented to person, place, and time. Vital signs are normal. He appears well-developed and well-nourished. Non-toxic appearance. He does not have a sickly appearance. He does not appear ill. No distress. HENT:   Head: Normocephalic and atraumatic. Right Ear: External ear normal.   Left Ear: External ear normal.   Nose: Nose normal.   Mouth/Throat: Oropharynx is clear and moist. No oropharyngeal exudate. Eyes: Pupils are equal, round, and reactive to light. Conjunctivae and EOM are normal. Right eye exhibits no discharge.  Left eye exhibits no discharge. No scleral icterus. Neck: Normal range of motion. Neck supple. No thyromegaly present. Cardiovascular: Normal rate, regular rhythm, S1 normal, S2 normal, normal heart sounds and intact distal pulses. Exam reveals no gallop and no friction rub. No murmur heard. Pulmonary/Chest: Effort normal and breath sounds normal. No stridor. No respiratory distress. He has no decreased breath sounds. He has no wheezes. He has no rhonchi. He has no rales. He exhibits no tenderness. Abdominal: Soft. Normal appearance and bowel sounds are normal. He exhibits no distension and no mass. There is no tenderness. There is no rebound and no guarding. Musculoskeletal: Normal range of motion. He exhibits no edema, tenderness or deformity. Lymphadenopathy:     He has no cervical adenopathy. Neurological: He is alert and oriented to person, place, and time. He has normal reflexes. No cranial nerve deficit. Coordination normal.   Skin: Skin is warm and dry. No rash noted. He is not diaphoretic. No erythema. No pallor. Psychiatric: He has a normal mood and affect. His speech is normal and behavior is normal. Judgment and thought content normal. Cognition and memory are normal.   Nursing note and vitals reviewed.         Vitals:    05/24/19 1119   BP: 127/83   Site: Left Upper Arm   Position: Sitting   Cuff Size: Large Adult   Pulse: 87   Resp: 16   Temp: 97.9 °F (36.6 °C)   TempSrc: Oral   Weight: 273 lb 6.4 oz (124 kg)   Height: 5' 10\" (1.778 m)     BP Readings from Last 3 Encounters:   05/24/19 127/83   01/28/19 114/73   10/15/18 115/73              DIAGNOSTIC FINDINGS:  CBC:  Lab Results   Component Value Date    WBC 10.2 07/12/2018    HGB 14.5 07/12/2018     07/12/2018       BMP:    Lab Results   Component Value Date     07/12/2018    K 4.6 07/12/2018     07/12/2018    CO2 26 07/12/2018    BUN 10 07/12/2018    CREATININE 0.78 07/12/2018    GLUCOSE 300 07/12/2018         FASTING LIPID PANEL:  Lab Results   Component Value Date    CHOL 223 (H) 07/12/2018    HDL 32 (L) 07/12/2018    TRIG 360 (H) 07/12/2018       Results for orders placed or performed in visit on 05/24/19   POCT glycosylated hemoglobin (Hb A1C)   Result Value Ref Range    Hemoglobin A1C 6.3 %         ASSESSMENT AND PLAN:   Diagnosis Orders   1. Type 2 diabetes mellitus with complication, without long-term current use of insulin (HCC)  POCT glycosylated hemoglobin (Hb A1C)   2. Essential hypertension  CBC Auto Differential    Comprehensive Metabolic Panel   3. Hypertriglyceridemia  fenofibrate 160 MG tablet   4. Lipid screening  Lipid, Fasting   5. Positive FIT (fecal immunochemical test)  Travis Anguiano MD, General Surgery, [place]   6. Gastroesophageal reflux disease, esophagitis presence not specified  Sand[facility]    famotidine (PEPCID) 20 MG tablet   7. Weight gain  TSH With Reflex Ft4   8. Need for hepatitis B booster vaccination  Hep B Vaccine Adult (ENGERIX B)   9. Need for 23-polyvalent pneumococcal polysaccharide vaccine  Pneumococcal polysaccharide vaccine 23-valent greater than or equal to 1yo subcutaneous/IM       FOLLOW UP AND INSTRUCTIONS:  · Return in about 3 months (around 8/24/2019), or if symptoms worsen or fail to improve. · Discussed use, benefit, and side effects of prescribed medications. Barriers to medication compliance addressed. All patient questions answered. Pt voiced understanding. · Patient instructed to return to the office if symptoms do not resolve or go directly to the ER if the symptoms worsen - patient voiced understanding.     · Patient given educational materials - see patient instructions    Mikhail [address] [facility]  5/28/2019, 9:21 AM    This note is created with the assistance of a speech-recognition program. While intending to generate a document that actually reflects the content of the visit, the document can still have some mistakes which may not have been identified and corrected by editing.

## 2019-05-29 ENCOUNTER — HOSPITAL ENCOUNTER (OUTPATIENT)
Age: 49
Discharge: HOME | End: 2019-05-29
Payer: COMMERCIAL

## 2019-05-29 ENCOUNTER — PREP FOR PROCEDURE (OUTPATIENT)
Dept: SURGERY | Age: 49
End: 2019-05-29

## 2019-05-29 ENCOUNTER — OFFICE VISIT (OUTPATIENT)
Dept: SURGERY | Age: 49
End: 2019-05-29
Payer: COMMERCIAL

## 2019-05-29 VITALS
WEIGHT: 273 LBS | BODY MASS INDEX: 39.08 KG/M2 | DIASTOLIC BLOOD PRESSURE: 82 MMHG | HEART RATE: 97 BPM | SYSTOLIC BLOOD PRESSURE: 122 MMHG | OXYGEN SATURATION: 97 % | HEIGHT: 70 IN

## 2019-05-29 DIAGNOSIS — I10 ESSENTIAL HYPERTENSION: ICD-10-CM

## 2019-05-29 DIAGNOSIS — Z13.220 LIPID SCREENING: ICD-10-CM

## 2019-05-29 DIAGNOSIS — K21.9 GASTROESOPHAGEAL REFLUX DISEASE, ESOPHAGITIS PRESENCE NOT SPECIFIED: ICD-10-CM

## 2019-05-29 DIAGNOSIS — R19.5 POSITIVE FIT (FECAL IMMUNOCHEMICAL TEST): Primary | ICD-10-CM

## 2019-05-29 DIAGNOSIS — R63.5 WEIGHT GAIN: ICD-10-CM

## 2019-05-29 LAB
ABSOLUTE EOS #: 0.18 K/UL (ref 0–0.44)
ABSOLUTE IMMATURE GRANULOCYTE: 0.04 K/UL (ref 0–0.3)
ABSOLUTE LYMPH #: 2.34 K/UL (ref 1.1–3.7)
ABSOLUTE MONO #: 0.83 K/UL (ref 0.1–1.2)
ALBUMIN SERPL-MCNC: 3.9 G/DL (ref 3.5–5.2)
ALBUMIN/GLOBULIN RATIO: 1.1 (ref 1–2.5)
ALP BLD-CCNC: 72 U/L (ref 40–129)
ALT SERPL-CCNC: 17 U/L (ref 5–41)
ANION GAP SERPL CALCULATED.3IONS-SCNC: 13 MMOL/L (ref 9–17)
AST SERPL-CCNC: 15 U/L
BASOPHILS # BLD: 1 % (ref 0–2)
BASOPHILS ABSOLUTE: 0.05 K/UL (ref 0–0.2)
BILIRUB SERPL-MCNC: 0.61 MG/DL (ref 0.3–1.2)
BUN BLDV-MCNC: 9 MG/DL (ref 6–20)
BUN/CREAT BLD: ABNORMAL (ref 9–20)
CALCIUM SERPL-MCNC: 8.9 MG/DL (ref 8.6–10.4)
CHLORIDE BLD-SCNC: 102 MMOL/L (ref 98–107)
CHOLESTEROL, FASTING: 154 MG/DL
CHOLESTEROL/HDL RATIO: 4.2
CO2: 20 MMOL/L (ref 20–31)
CREAT SERPL-MCNC: 0.75 MG/DL (ref 0.7–1.2)
DIFFERENTIAL TYPE: ABNORMAL
EOSINOPHILS RELATIVE PERCENT: 2 % (ref 1–4)
GFR AFRICAN AMERICAN: >60 ML/MIN
GFR NON-AFRICAN AMERICAN: >60 ML/MIN
GFR SERPL CREATININE-BSD FRML MDRD: ABNORMAL ML/MIN/{1.73_M2}
GFR SERPL CREATININE-BSD FRML MDRD: ABNORMAL ML/MIN/{1.73_M2}
GLUCOSE BLD-MCNC: 123 MG/DL (ref 70–99)
HCT VFR BLD CALC: 45 % (ref 40.7–50.3)
HDLC SERPL-MCNC: 37 MG/DL
HEMOGLOBIN: 14.5 G/DL (ref 13–17)
IMMATURE GRANULOCYTES: 1 %
LDL CHOLESTEROL: 95 MG/DL (ref 0–130)
LYMPHOCYTES # BLD: 27 % (ref 24–43)
MCH RBC QN AUTO: 32.2 PG (ref 25.2–33.5)
MCHC RBC AUTO-ENTMCNC: 32.2 G/DL (ref 28.4–34.8)
MCV RBC AUTO: 99.8 FL (ref 82.6–102.9)
MONOCYTES # BLD: 9 % (ref 3–12)
NRBC AUTOMATED: 0 PER 100 WBC
PDW BLD-RTO: 12.9 % (ref 11.8–14.4)
PLATELET # BLD: 238 K/UL (ref 138–453)
PLATELET ESTIMATE: ABNORMAL
PMV BLD AUTO: 11.9 FL (ref 8.1–13.5)
POTASSIUM SERPL-SCNC: 4.8 MMOL/L (ref 3.7–5.3)
RBC # BLD: 4.51 M/UL (ref 4.21–5.77)
RBC # BLD: ABNORMAL 10*6/UL
SEG NEUTROPHILS: 60 % (ref 36–65)
SEGMENTED NEUTROPHILS ABSOLUTE COUNT: 5.36 K/UL (ref 1.5–8.1)
SODIUM BLD-SCNC: 135 MMOL/L (ref 135–144)
TOTAL PROTEIN: 7.4 G/DL (ref 6.4–8.3)
TRIGLYCERIDE, FASTING: 108 MG/DL
TSH SERPL DL<=0.05 MIU/L-ACNC: 1.33 MIU/L (ref 0.3–5)
VLDLC SERPL CALC-MCNC: ABNORMAL MG/DL (ref 1–30)
WBC # BLD: 8.8 K/UL (ref 3.5–11.3)
WBC # BLD: ABNORMAL 10*3/UL

## 2019-05-29 PROCEDURE — 36415 COLL VENOUS BLD VENIPUNCTURE: CPT

## 2019-05-29 PROCEDURE — 85025 COMPLETE CBC W/AUTO DIFF WBC: CPT

## 2019-05-29 PROCEDURE — 99203 OFFICE O/P NEW LOW 30 MIN: CPT | Performed by: SURGERY

## 2019-05-29 PROCEDURE — 80061 LIPID PANEL: CPT

## 2019-05-29 PROCEDURE — 80053 COMPREHEN METABOLIC PANEL: CPT

## 2019-05-29 PROCEDURE — 84443 ASSAY THYROID STIM HORMONE: CPT

## 2019-05-29 RX ORDER — SODIUM, POTASSIUM,MAG SULFATES 17.5-3.13G
SOLUTION, RECONSTITUTED, ORAL ORAL
Qty: 1 KIT | Refills: 0 | Status: SHIPPED | OUTPATIENT
Start: 2019-05-29 | End: 2020-09-22

## 2019-05-29 NOTE — H&P (VIEW-ONLY)
Yara De La Cruz is an 44 y.o.  male seen today in evaluation for GERD symptoms as well as a positive fit test.  He denies any abdominal pain or rectal bleeding. He has had fairly significant heartburn symptoms for several months. He was just started on an H2 blocker. Patient's history is significant for his father being previously diagnosed with colon cancer. He has not undergone any prior colonoscopy or upper endoscopy. Past Medical History:   Diagnosis Date    Diabetes mellitus ([facility] [address]     Essential hypertension 10/15/2018    Hypertriglyceridemia 10/15/2018    Type 2 diabetes mellitus with complication, without long-term current use of insulin ([facility] [address] 7/11/2018       Allergies: Allergies   Allergen Reactions    Toradol [Ketorolac Tromethamine] Shortness Of Breath       Active Problems:    * No active hospital problems. *  Resolved Problems:    * No resolved hospital problems. *    There were no vitals taken for this visit. Review of Systems   Constitutional: Negative. Respiratory: Negative. Cardiovascular: Negative. Gastrointestinal: Blood in stool: + FIT.        + GERD   Musculoskeletal: Negative. Neurological: Negative. Physical Exam   Constitutional: He is oriented to person, place, and time. He appears well-developed and well-nourished. Neck: Normal range of motion. Cardiovascular: Normal rate and regular rhythm. Pulmonary/Chest: Effort normal and breath sounds normal.   Abdominal: Soft. There is no tenderness. Neurological: He is alert and oriented to person, place, and time. Nursing note and vitals reviewed. Assessment:  #1. GERD  #2. Positive FIT    Plan:  #1.  Upper endoscopy  #2. Colonoscopy    Risks, benefits, options and potential complication were discussed with the patient and they agree to proceed and consent signed.     Deborah Boas, MD  5/29/2019

## 2019-05-31 ENCOUNTER — ANESTHESIA EVENT (OUTPATIENT)
Dept: OPERATING ROOM | Age: 49
End: 2019-05-31
Payer: COMMERCIAL

## 2019-06-15 DIAGNOSIS — E11.8 TYPE 2 DIABETES MELLITUS WITH COMPLICATION, WITHOUT LONG-TERM CURRENT USE OF INSULIN (HCC): ICD-10-CM

## 2019-06-15 DIAGNOSIS — I10 ESSENTIAL HYPERTENSION: ICD-10-CM

## 2019-06-15 DIAGNOSIS — E78.1 HYPERTRIGLYCERIDEMIA: ICD-10-CM

## 2019-06-17 RX ORDER — LISINOPRIL 10 MG/1
TABLET ORAL
Qty: 30 TABLET | Refills: 2 | Status: SHIPPED | OUTPATIENT
Start: 2019-06-17 | End: 2019-09-05 | Stop reason: SDUPTHER

## 2019-06-17 RX ORDER — GLIPIZIDE 5 MG/1
TABLET ORAL
Qty: 60 TABLET | Refills: 2 | Status: SHIPPED | OUTPATIENT
Start: 2019-06-17 | End: 2020-01-02

## 2019-06-17 RX ORDER — ATORVASTATIN CALCIUM 10 MG/1
TABLET, FILM COATED ORAL
Qty: 30 TABLET | Refills: 2 | Status: SHIPPED | OUTPATIENT
Start: 2019-06-17 | End: 2019-09-05 | Stop reason: SDUPTHER

## 2019-06-17 NOTE — TELEPHONE ENCOUNTER
Electronic medication refill request. Pharmacy on file. Please advise. Last visit: 5/24/19  Last Med refill: 5/28/19      Next Visit Date:  Future Appointments   Date Time Provider John Gordon   6/24/2019 10:45 AM Paco SANON [facility]   9/3/2019 11:00 AM Serene Evy [facility] [address] Maintenance   Topic Date Due    Diabetic foot exam  01/26/1978    Diabetic retinal exam  01/26/1978    HIV screen  01/26/1983    DTaP/Tdap/Td vaccine (1 - Tdap) 02/05/2020 (Originally 1/26/1987)    Shingles Vaccine (1 of 2) 02/05/2020 (Originally 1/26/2018)    Flu vaccine (Season Ended) 02/12/2020 (Originally 9/1/2019)    Hepatitis B Vaccine (2 of 3 - Risk 3-dose series) 06/27/2019    Diabetic microalbuminuria test  07/11/2019    Colon Cancer Screen FIT/FOBT  08/15/2019    A1C test (Diabetic or Prediabetic)  05/24/2020    Lipid screen  05/29/2020    Potassium monitoring  05/29/2020    Creatinine monitoring  05/29/2020    Pneumococcal 0-64 years Vaccine  Completed       Hemoglobin A1C (%)   Date Value   05/24/2019 6.3   10/15/2018 6.2   07/11/2018 13.3             ( goal A1C is < 7)   Microalb/Crt.  Ratio (mcg/mg creat)   Date Value   07/11/2018 CANNOT BE CALCULATED     LDL Cholesterol (mg/dL)   Date Value   05/29/2019 95   07/12/2018 119       (goal LDL is <100)   AST (U/L)   Date Value   05/29/2019 15     ALT (U/L)   Date Value   05/29/2019 17     BUN (mg/dL)   Date Value   05/29/2019 9     BP Readings from Last 3 Encounters:   05/29/19 122/82   05/24/19 127/83   01/28/19 114/73          (goal 120/80)    All Future Testing planned in [facility]  Lab Frequency Next Occurrence   Stress Test W Pharm Once 07/11/2018               Patient Active Problem List:     Type 2 diabetes mellitus with complication, without long-term current use of insulin (HCC)     Chest pain     Essential hypertension     Hypertriglyceridemia

## 2019-06-21 NOTE — PROGRESS NOTES
Pt instructed on bowel prep/ clear liquids only day before procedure. Remove all jewelry and  piercings.

## 2019-06-24 ENCOUNTER — NURSE ONLY (OUTPATIENT)
Dept: FAMILY MEDICINE CLINIC | Age: 49
End: 2019-06-24
Payer: COMMERCIAL

## 2019-06-24 ENCOUNTER — TELEPHONE (OUTPATIENT)
Dept: SURGERY | Age: 49
End: 2019-06-24

## 2019-06-24 DIAGNOSIS — Z23 NEED FOR HEPATITIS B VACCINATION: Primary | ICD-10-CM

## 2019-06-24 PROCEDURE — 90746 HEPB VACCINE 3 DOSE ADULT IM: CPT | Performed by: PHYSICIAN ASSISTANT

## 2019-06-24 PROCEDURE — 90471 IMMUNIZATION ADMIN: CPT | Performed by: PHYSICIAN ASSISTANT

## 2019-06-24 NOTE — PROGRESS NOTES
Take as directed - dispense one Kit 1 kit 0    fenofibrate 160 MG tablet Take 1 tablet by mouth daily 90 tablet 1    famotidine (PEPCID) 20 MG tablet Take 1 tablet by mouth 2 times daily 60 tablet 3    metFORMIN (GLUCOPHAGE-XR) 500 MG extended release tablet Take 1 tablet by mouth 2 times daily (before meals) Take 500 mg a day for 2 weeks, then increase the dose to twice a day. 60 tablet 2     No current facility-administered medications for this visit. Allergies   Allergen Reactions    Toradol [Ketorolac Tromethamine] Shortness Of Breath       Health Maintenance   Topic Date Due    Diabetic foot exam  01/26/1978    Diabetic retinal exam  01/26/1978    HIV screen  01/26/1983    DTaP/Tdap/Td vaccine (1 - Tdap) 02/05/2020 (Originally 1/26/1987)    Shingles Vaccine (1 of 2) 02/05/2020 (Originally 1/26/2018)    Flu vaccine (Season Ended) 02/12/2020 (Originally 9/1/2019)    Hepatitis B Vaccine (2 of 3 - Risk 3-dose series) 06/27/2019    Diabetic microalbuminuria test  07/11/2019    Colon Cancer Screen FIT/FOBT  08/15/2019    A1C test (Diabetic or Prediabetic)  05/24/2020    Lipid screen  05/29/2020    Potassium monitoring  05/29/2020    Creatinine monitoring  05/29/2020    Pneumococcal 0-64 years Vaccine  Completed       Subjective:     Review of Systems    Objective:     Physical Exam  There were no vitals taken for this visit. Assessment:       {No diagnosis found. (Refresh or delete this SmartLink)}          Plan:      No follow-ups on file. No orders of the defined types were placed in this encounter. No orders of the defined types were placed in this encounter. Patient given educational materials - see patient instructions. Discussed use, benefit, and side effects of prescribed medications. All patientquestions answered. Pt voiced understanding. Reviewed health maintenance. Instructedto continue current medications, diet and exercise. Patient agreed with treatmentplan.  Follow

## 2019-06-25 ENCOUNTER — HOSPITAL ENCOUNTER (OUTPATIENT)
Age: 49
Setting detail: OUTPATIENT SURGERY
Discharge: HOME | End: 2019-06-25
Attending: SURGERY | Admitting: SURGERY
Payer: COMMERCIAL

## 2019-06-25 ENCOUNTER — ANESTHESIA (OUTPATIENT)
Dept: OPERATING ROOM | Age: 49
End: 2019-06-25
Payer: COMMERCIAL

## 2019-06-25 VITALS
SYSTOLIC BLOOD PRESSURE: 108 MMHG | OXYGEN SATURATION: 94 % | DIASTOLIC BLOOD PRESSURE: 59 MMHG | RESPIRATION RATE: 17 BRPM

## 2019-06-25 VITALS
RESPIRATION RATE: 7 BRPM | WEIGHT: 267.42 LBS | OXYGEN SATURATION: 100 % | BODY MASS INDEX: 38.28 KG/M2 | DIASTOLIC BLOOD PRESSURE: 51 MMHG | TEMPERATURE: 97.2 F | HEART RATE: 101 BPM | HEIGHT: 70 IN | SYSTOLIC BLOOD PRESSURE: 101 MMHG

## 2019-06-25 LAB — GLUCOSE BLD-MCNC: 144 MG/DL (ref 75–110)

## 2019-06-25 PROCEDURE — 88305 TISSUE EXAM BY PATHOLOGIST: CPT

## 2019-06-25 PROCEDURE — 2500000003 HC RX 250 WO HCPCS: Performed by: ANESTHESIOLOGY

## 2019-06-25 PROCEDURE — 3609012400 HC EGD TRANSORAL BIOPSY SINGLE/MULTIPLE: Performed by: SURGERY

## 2019-06-25 PROCEDURE — 3609027000 HC COLONOSCOPY: Performed by: SURGERY

## 2019-06-25 PROCEDURE — 6360000002 HC RX W HCPCS: Performed by: ANESTHESIOLOGY

## 2019-06-25 PROCEDURE — 82947 ASSAY GLUCOSE BLOOD QUANT: CPT

## 2019-06-25 PROCEDURE — 45378 DIAGNOSTIC COLONOSCOPY: CPT | Performed by: SURGERY

## 2019-06-25 PROCEDURE — 7100000010 HC PHASE II RECOVERY - FIRST 15 MIN: Performed by: SURGERY

## 2019-06-25 PROCEDURE — 43239 EGD BIOPSY SINGLE/MULTIPLE: CPT | Performed by: SURGERY

## 2019-06-25 PROCEDURE — 2709999900 HC NON-CHARGEABLE SUPPLY: Performed by: SURGERY

## 2019-06-25 PROCEDURE — 3700000001 HC ADD 15 MINUTES (ANESTHESIA): Performed by: SURGERY

## 2019-06-25 PROCEDURE — 2580000003 HC RX 258: Performed by: ANESTHESIOLOGY

## 2019-06-25 PROCEDURE — 7100000000 HC PACU RECOVERY - FIRST 15 MIN: Performed by: SURGERY

## 2019-06-25 PROCEDURE — 3700000000 HC ANESTHESIA ATTENDED CARE: Performed by: SURGERY

## 2019-06-25 RX ORDER — FENTANYL CITRATE 50 UG/ML
50 INJECTION, SOLUTION INTRAMUSCULAR; INTRAVENOUS EVERY 5 MIN PRN
Status: DISCONTINUED | OUTPATIENT
Start: 2019-06-25 | End: 2019-06-25 | Stop reason: HOSPADM

## 2019-06-25 RX ORDER — METOCLOPRAMIDE HYDROCHLORIDE 5 MG/ML
10 INJECTION INTRAMUSCULAR; INTRAVENOUS
Status: DISCONTINUED | OUTPATIENT
Start: 2019-06-25 | End: 2019-06-25 | Stop reason: HOSPADM

## 2019-06-25 RX ORDER — SODIUM CHLORIDE 0.9 % (FLUSH) 0.9 %
10 SYRINGE (ML) INJECTION PRN
Status: DISCONTINUED | OUTPATIENT
Start: 2019-06-25 | End: 2019-06-25 | Stop reason: HOSPADM

## 2019-06-25 RX ORDER — SODIUM CHLORIDE 9 MG/ML
INJECTION, SOLUTION INTRAVENOUS CONTINUOUS
Status: DISCONTINUED | OUTPATIENT
Start: 2019-06-25 | End: 2019-06-25 | Stop reason: HOSPADM

## 2019-06-25 RX ORDER — PROPOFOL 10 MG/ML
INJECTION, EMULSION INTRAVENOUS PRN
Status: DISCONTINUED | OUTPATIENT
Start: 2019-06-25 | End: 2019-06-25 | Stop reason: SDUPTHER

## 2019-06-25 RX ORDER — LIDOCAINE HYDROCHLORIDE 10 MG/ML
INJECTION, SOLUTION INFILTRATION; PERINEURAL PRN
Status: DISCONTINUED | OUTPATIENT
Start: 2019-06-25 | End: 2019-06-25 | Stop reason: SDUPTHER

## 2019-06-25 RX ORDER — SODIUM CHLORIDE 0.9 % (FLUSH) 0.9 %
10 SYRINGE (ML) INJECTION EVERY 12 HOURS SCHEDULED
Status: DISCONTINUED | OUTPATIENT
Start: 2019-06-25 | End: 2019-06-25 | Stop reason: HOSPADM

## 2019-06-25 RX ORDER — FENTANYL CITRATE 50 UG/ML
25 INJECTION, SOLUTION INTRAMUSCULAR; INTRAVENOUS EVERY 5 MIN PRN
Status: DISCONTINUED | OUTPATIENT
Start: 2019-06-25 | End: 2019-06-25 | Stop reason: HOSPADM

## 2019-06-25 RX ORDER — MEPERIDINE HYDROCHLORIDE 50 MG/ML
12.5 INJECTION INTRAMUSCULAR; INTRAVENOUS; SUBCUTANEOUS EVERY 5 MIN PRN
Status: DISCONTINUED | OUTPATIENT
Start: 2019-06-25 | End: 2019-06-25 | Stop reason: HOSPADM

## 2019-06-25 RX ORDER — SODIUM CHLORIDE, SODIUM LACTATE, POTASSIUM CHLORIDE, CALCIUM CHLORIDE 600; 310; 30; 20 MG/100ML; MG/100ML; MG/100ML; MG/100ML
INJECTION, SOLUTION INTRAVENOUS CONTINUOUS
Status: DISCONTINUED | OUTPATIENT
Start: 2019-06-25 | End: 2019-06-25 | Stop reason: HOSPADM

## 2019-06-25 RX ORDER — MIDAZOLAM HYDROCHLORIDE 1 MG/ML
2 INJECTION INTRAMUSCULAR; INTRAVENOUS
Status: DISCONTINUED | OUTPATIENT
Start: 2019-06-25 | End: 2019-06-25 | Stop reason: HOSPADM

## 2019-06-25 RX ORDER — HYDRALAZINE HYDROCHLORIDE 20 MG/ML
5 INJECTION INTRAMUSCULAR; INTRAVENOUS EVERY 10 MIN PRN
Status: DISCONTINUED | OUTPATIENT
Start: 2019-06-25 | End: 2019-06-25 | Stop reason: HOSPADM

## 2019-06-25 RX ORDER — SODIUM CHLORIDE 9 MG/ML
INJECTION, SOLUTION INTRAVENOUS CONTINUOUS PRN
Status: DISCONTINUED | OUTPATIENT
Start: 2019-06-25 | End: 2019-06-25 | Stop reason: SDUPTHER

## 2019-06-25 RX ORDER — ONDANSETRON 2 MG/ML
4 INJECTION INTRAMUSCULAR; INTRAVENOUS
Status: DISCONTINUED | OUTPATIENT
Start: 2019-06-25 | End: 2019-06-25 | Stop reason: HOSPADM

## 2019-06-25 RX ORDER — GLYCOPYRROLATE 1 MG/5 ML
SYRINGE (ML) INTRAVENOUS PRN
Status: DISCONTINUED | OUTPATIENT
Start: 2019-06-25 | End: 2019-06-25 | Stop reason: SDUPTHER

## 2019-06-25 RX ADMIN — LIDOCAINE HYDROCHLORIDE 5 ML: 10 INJECTION, SOLUTION INFILTRATION; PERINEURAL at 08:07

## 2019-06-25 RX ADMIN — PROPOFOL 50 MG: 10 INJECTION, EMULSION INTRAVENOUS at 08:11

## 2019-06-25 RX ADMIN — PROPOFOL 70 MG: 10 INJECTION, EMULSION INTRAVENOUS at 08:15

## 2019-06-25 RX ADMIN — PROPOFOL 50 MG: 10 INJECTION, EMULSION INTRAVENOUS at 08:21

## 2019-06-25 RX ADMIN — SODIUM CHLORIDE: 9 INJECTION, SOLUTION INTRAVENOUS at 07:30

## 2019-06-25 RX ADMIN — Medication 0.2 MG: at 08:06

## 2019-06-25 RX ADMIN — PROPOFOL 50 MG: 10 INJECTION, EMULSION INTRAVENOUS at 08:19

## 2019-06-25 RX ADMIN — PROPOFOL 70 MG: 10 INJECTION, EMULSION INTRAVENOUS at 08:07

## 2019-06-25 ASSESSMENT — PULMONARY FUNCTION TESTS
PIF_VALUE: 0
PIF_VALUE: 2
PIF_VALUE: 0
PIF_VALUE: 1
PIF_VALUE: 0

## 2019-06-25 ASSESSMENT — PAIN SCALES - GENERAL
PAINLEVEL_OUTOF10: 0
PAINLEVEL_OUTOF10: 0

## 2019-06-25 ASSESSMENT — PAIN - FUNCTIONAL ASSESSMENT: PAIN_FUNCTIONAL_ASSESSMENT: 0-10

## 2019-06-25 ASSESSMENT — COPD QUESTIONNAIRES: CAT_SEVERITY: MILD

## 2019-06-25 NOTE — ANESTHESIA POSTPROCEDURE EVALUATION
Department of Anesthesiology  Postprocedure Note    Patient: Rodrigo Blas  MRN: [id number]  YOB: 1970  Date of evaluation: 6/25/2019  Time:  9:12 AM     Procedure Summary     Date:  06/25/19 Room / Location:  [facility] OR 01 / [facility]    Anesthesia Start:  0801 Anesthesia Stop:  8206    Procedures:       EGD BIOPSY (N/A )      COLORECTAL CANCER SCREENING, NOT HIGH RISK (N/A ) Diagnosis:  (H/O GERD)    Surgeon: Dorothy Clemente MD Responsible Provider:  Lidya Jackson MD    Anesthesia Type:  MAC ASA Status:  3          Anesthesia Type: MAC    Radha Phase I: Radha Score: 8    Radha Phase II:      Last vitals: Reviewed and per EMR flowsheets.        Anesthesia Post Evaluation    Patient location during evaluation: PACU  Patient participation: complete - patient participated  Level of consciousness: awake and alert  Pain score: 0  Airway patency: patent  Nausea & Vomiting: no nausea and no vomiting  Complications: no  Cardiovascular status: blood pressure returned to baseline and hemodynamically stable  Respiratory status: acceptable  Hydration status: stable

## 2019-06-25 NOTE — ANESTHESIA PRE PROCEDURE
Department of Anesthesiology  Preprocedure Note       Name:  Mat Oliver   Age:  44 y.o.  :  1970                                          MRN:  [id number]         Date:  2019      Surgeon: Pat Granados):  Tj Pham MD    Procedure: EGD ESOPHAGOGASTRODUODENOSCOPY (N/A )  COLORECTAL CANCER SCREENING, NOT HIGH RISK (N/A )    Medications prior to admission:   Prior to Admission medications    Medication Sig Start Date End Date Taking? Authorizing Provider   atorvastatin (LIPITOR) 10 MG tablet TAKE 1 TABLET BY MOUTH ONCE DAILY 19  Yes Jean Marcum PA-C   glipiZIDE (GLUCOTROL) 5 MG tablet TAKE 1 TABLET BY MOUTH TWICE DAILY 19  Yes Jean Marcum PA-C   lisinopril (PRINIVIL;ZESTRIL) 10 MG tablet TAKE 1 TABLET BY MOUTH ONCE DAILY 19  Yes Jean Marcum PA-C   SUPREP BOWEL PREP KIT 17.5-3.13-1.6 GM/177ML SOLN Take as directed - dispense one Kit 19  Yes Tj Pham MD   fenofibrate 160 MG tablet Take 1 tablet by mouth daily 19  Yes Jean Marcum PA-C   famotidine (PEPCID) 20 MG tablet Take 1 tablet by mouth 2 times daily 19  Yes Jean Marcum PA-C   metFORMIN (GLUCOPHAGE-XR) 500 MG extended release tablet Take 1 tablet by mouth 2 times daily (before meals) Take 500 mg a day for 2 weeks, then increase the dose to twice a day. 19  Yes Jean Marcum PA-C       Current medications:    Current Facility-Administered Medications   Medication Dose Route Frequency Provider Last Rate Last Dose    0.9 % sodium chloride infusion   Intravenous Continuous Adry Reeves MD        lactated ringers infusion   Intravenous Continuous Adry Reeves MD        sodium chloride flush 0.9 % injection 10 mL  10 mL Intravenous 2 times per day Adry Reeves MD        sodium chloride flush 0.9 % injection 10 mL  10 mL Intravenous PRN Adry Reeves MD        midazolam (VERSED) injection 2 mg  2 mg Intravenous Once PRN Adry Reeves MD           Allergies:     Allergies   Allergen Reactions    Toradol [Ketorolac Tromethamine] Shortness Of Breath       Problem List:    Patient Active Problem List   Diagnosis Code    Type 2 diabetes mellitus with complication, without long-term current use of insulin ([facility]) E11.8    Chest pain R07.9    Essential hypertension I10    Hypertriglyceridemia E78.1       Past Medical History:        Diagnosis Date    CAD (coronary artery disease)     Diabetes mellitus ([facility]     Essential hypertension 10/15/2018    Hypertriglyceridemia 10/15/2018    Type 2 diabetes mellitus with complication, without long-term current use of insulin ([facility] 7/11/2018       Past Surgical History:        Procedure Laterality Date    TONSILLECTOMY         Social History:    Social History     Tobacco Use    Smoking status: Current Every Day Smoker     Packs/day: 1.00     Years: 0.00     Pack years: 0.00     Types: Cigarettes    Smokeless tobacco: Former User   Substance Use Topics    Alcohol use: Yes     Comment: few beers ocasionally                                Ready to quit: Not Answered  Counseling given: Yes      Vital Signs (Current):   Vitals:    06/25/19 0710   BP: (!) 130/91   Pulse: 101   Resp: 18   Temp: 97.7 °F (36.5 °C)   TempSrc: Temporal   SpO2: 95%   Weight: 267 lb 6.7 oz (121.3 kg)   Height: 5' 10\" (1.778 m)                                              BP Readings from Last 3 Encounters:   06/25/19 (!) 130/91   05/29/19 122/82   05/24/19 127/83       NPO Status: Time of last liquid consumption: 2308                        Time of last solid consumption: 0709                        Date of last liquid consumption: 06/24/19                        Date of last solid food consumption: 06/23/19    BMI:   Wt Readings from Last 3 Encounters:   06/25/19 267 lb 6.7 oz (121.3 kg)   05/29/19 273 lb (123.8 kg)   05/24/19 273 lb 6.4 oz (124 kg)     Body mass index is 38.37 kg/m².     CBC:   Lab Results   Component Value Date    WBC 8.8 05/29/2019    RBC 4.51 05/29/2019    HGB 14.5 05/29/2019 HCT 45.0 05/29/2019    MCV 99.8 05/29/2019    RDW 12.9 05/29/2019     05/29/2019       CMP:   Lab Results   Component Value Date     05/29/2019    K 4.8 05/29/2019     05/29/2019    CO2 20 05/29/2019    BUN 9 05/29/2019    CREATININE 0.75 05/29/2019    GFRAA >60 05/29/2019    LABGLOM >60 05/29/2019    GLUCOSE 123 05/29/2019    PROT 7.4 05/29/2019    CALCIUM 8.9 05/29/2019    BILITOT 0.61 05/29/2019    ALKPHOS 72 05/29/2019    AST 15 05/29/2019    ALT 17 05/29/2019       POC Tests: No results for input(s): POCGLU, POCNA, POCK, POCCL, POCBUN, POCHEMO, POCHCT in the last 72 hours. Coags: No results found for: PROTIME, INR, APTT    HCG (If Applicable): No results found for: PREGTESTUR, PREGSERUM, HCG, HCGQUANT     ABGs: No results found for: PHART, PO2ART, UOD5CGG, XGL9XOV, BEART, D3JMXXPP     Type & Screen (If Applicable):  No results found for: LABABO, [address]    Anesthesia Evaluation  Patient summary reviewed and Nursing notes reviewed  Airway: Mallampati: II  TM distance: >3 FB   Neck ROM: full  Mouth opening: > = 3 FB Dental: normal exam         Pulmonary:normal exam  breath sounds clear to auscultation  (+) COPD: mild,  sleep apnea: on noncompliant,            Patient did not smoke on day of surgery. Cardiovascular:  Exercise tolerance: good (>4 METS),   (+) hypertension: moderate,       ECG reviewed  Rhythm: regular  Rate: normal    Stress test reviewed                Neuro/Psych:   Negative Neuro/Psych ROS              GI/Hepatic/Renal:   (+) GERD: poorly controlled, renal disease: kidney stones,           Endo/Other:    (+) DiabetesType II DM, well controlled, , .                 Abdominal:   (+) obese,         Vascular: negative vascular ROS. Anesthesia Plan      MAC     ASA 3       Induction: intravenous. Anesthetic plan and risks discussed with patient.         Attending anesthesiologist reviewed and agrees with Pre Eval Amy Yates MD   6/25/2019

## 2019-06-25 NOTE — INTERVAL H&P NOTE
H&P Update    Patient's History and Physical from June 25,  was reviewed. Patient examined. There has been no change.     Genesis Rosario

## 2019-06-25 NOTE — PROGRESS NOTES
Rec'd from OR. Lethargic, but responds appropriately to questions. No C/O pain or cramps. Positioned on left side. No Po or rectal drng.   Passing flatus

## 2019-06-25 NOTE — OP NOTE
Operative Note    Maury Moseley   [id number]   6/25/2019     Pre-Op Diagnosis: GERD                                  screening for colon cancer    Post-Op Diagnosis: Mild gastritis                                   Normal Colonoscopy     Procedure: EGD w/biopsy                     Colonoscopy     Anesthesia: IV General     EBL: min    Speciman: antral bx    Clinical Indications: Patient presents for EGD and screening colonoscopy. Recent history of significant GERD symptoms now on H2 blocker. +FH colon CA (father)  No prior EGD or colonoscopy. Risks, benefits, options and potential complications of the procedure were discussed in detail and they agree to proceed and consent signed. Details of Procedure: The patient was brought to the colonoscopy suite and placed in the left lateral decubitus position and a bite block placed. Under IV general anesthesia the fiberoptic endoscope was passed through the oropharynx into the esophagus. It was easily advanced into the stomach. It was insufflated. There were some mild inflammatory changes. No ulcers. Scope was then passed through the pylorus into the duodenum. The first and second portions of the duodenum were unremarkable. The endoscope was then withdrawn into the stomach and retroflexed. The upper portion stomach appeared grossly normal.  Antral biopsies were then taken x2. Both sites were hemostatic following this. . Gastroesophageal junction appeared grossly normal.  Remainder of the esophagus appeared normal and fine withdraw the endoscope this portion of the procedure was ended. Under continued IV general anesthesia the fiberoptic colonoscope was introduced into the rectum. This was subsequently advanced through the colon to the cecum. The cecal landmarks were identified. The bowel prep was good. Gradual withdrawal of the colonoscope was then undertaken.   No vascular, polypoid or mucosal lesions were noted throughout the entire length of the

## 2019-06-27 LAB — SURGICAL PATHOLOGY REPORT: NORMAL

## 2019-06-28 DIAGNOSIS — E11.8 TYPE 2 DIABETES MELLITUS WITH COMPLICATION, WITHOUT LONG-TERM CURRENT USE OF INSULIN (HCC): ICD-10-CM

## 2019-06-28 NOTE — TELEPHONE ENCOUNTER
Electronic medication refill request. Pharmacy on file. Please advise. Last visit: 5/24/19  Last Med refill: 3/28/19    Next Visit Date:  Future Appointments   Date Time Provider John Gordon   9/3/2019 11:00 AM Krystle Ballard [facility] AND WOMEN[facility]S [facility] [address] Maintenance   Topic Date Due    Diabetic foot exam  01/26/1978    Diabetic retinal exam  01/26/1978    HIV screen  01/26/1983    Diabetic microalbuminuria test  07/11/2019    DTaP/Tdap/Td vaccine (1 - Tdap) 02/05/2020 (Originally 1/26/1987)    Shingles Vaccine (1 of 2) 02/05/2020 (Originally 1/26/2018)    Flu vaccine (Season Ended) 02/12/2020 (Originally 9/1/2019)    Hepatitis B Vaccine (3 of 3 - Risk 3-dose series) 10/24/2019    A1C test (Diabetic or Prediabetic)  05/24/2020    Lipid screen  05/29/2020    Potassium monitoring  05/29/2020    Creatinine monitoring  05/29/2020    Colon cancer screen colonoscopy  06/25/2029    Pneumococcal 0-64 years Vaccine  Completed       Hemoglobin A1C (%)   Date Value   05/24/2019 6.3   10/15/2018 6.2   07/11/2018 13.3             ( goal A1C is < 7)   Microalb/Crt.  Ratio (mcg/mg creat)   Date Value   07/11/2018 CANNOT BE CALCULATED     LDL Cholesterol (mg/dL)   Date Value   05/29/2019 95   07/12/2018 119       (goal LDL is <100)   AST (U/L)   Date Value   05/29/2019 15     ALT (U/L)   Date Value   05/29/2019 17     BUN (mg/dL)   Date Value   05/29/2019 9     BP Readings from Last 3 Encounters:   06/25/19 (!) 108/59   06/25/19 (!) 101/51   05/29/19 122/82          (goal 120/80)    All Future Testing planned in [facility]  Lab Frequency Next Occurrence   Stress Test W Pharm Once 07/11/2018               Patient Active Problem List:     Type 2 diabetes mellitus with complication, without long-term current use of insulin (HCC)     Chest pain     Essential hypertension     Hypertriglyceridemia     Gastroesophageal reflux disease without esophagitis     Encounter for screening colonoscopy

## 2019-07-01 RX ORDER — METFORMIN HYDROCHLORIDE 500 MG/1
TABLET, EXTENDED RELEASE ORAL
Qty: 60 TABLET | Refills: 2 | Status: SHIPPED | OUTPATIENT
Start: 2019-07-01 | End: 2019-11-26 | Stop reason: SDUPTHER

## 2019-09-03 ENCOUNTER — HOSPITAL ENCOUNTER (OUTPATIENT)
Age: 49
Setting detail: SPECIMEN
Discharge: HOME | End: 2019-09-03
Payer: COMMERCIAL

## 2019-09-03 ENCOUNTER — OFFICE VISIT (OUTPATIENT)
Dept: FAMILY MEDICINE CLINIC | Age: 49
End: 2019-09-03
Payer: COMMERCIAL

## 2019-09-03 VITALS
HEART RATE: 95 BPM | WEIGHT: 279 LBS | BODY MASS INDEX: 39.94 KG/M2 | TEMPERATURE: 97.1 F | HEIGHT: 70 IN | RESPIRATION RATE: 16 BRPM | SYSTOLIC BLOOD PRESSURE: 132 MMHG | DIASTOLIC BLOOD PRESSURE: 89 MMHG

## 2019-09-03 DIAGNOSIS — Z12.5 PROSTATE CANCER SCREENING: ICD-10-CM

## 2019-09-03 DIAGNOSIS — E11.8 TYPE 2 DIABETES MELLITUS WITH COMPLICATION, WITHOUT LONG-TERM CURRENT USE OF INSULIN (HCC): Primary | ICD-10-CM

## 2019-09-03 DIAGNOSIS — K21.9 GASTROESOPHAGEAL REFLUX DISEASE, ESOPHAGITIS PRESENCE NOT SPECIFIED: ICD-10-CM

## 2019-09-03 LAB
ABSOLUTE EOS #: 0.2 K/UL (ref 0–0.44)
ABSOLUTE IMMATURE GRANULOCYTE: 0.04 K/UL (ref 0–0.3)
ABSOLUTE LYMPH #: 1.82 K/UL (ref 1.1–3.7)
ABSOLUTE MONO #: 0.57 K/UL (ref 0.1–1.2)
ALBUMIN SERPL-MCNC: 3.8 G/DL (ref 3.5–5.2)
ALBUMIN/GLOBULIN RATIO: 1.1 (ref 1–2.5)
ALP BLD-CCNC: 52 U/L (ref 40–129)
ALT SERPL-CCNC: 18 U/L (ref 5–41)
ANION GAP SERPL CALCULATED.3IONS-SCNC: 12 MMOL/L (ref 9–17)
AST SERPL-CCNC: 16 U/L
BASOPHILS # BLD: 1 % (ref 0–2)
BASOPHILS ABSOLUTE: 0.04 K/UL (ref 0–0.2)
BILIRUB SERPL-MCNC: 0.22 MG/DL (ref 0.3–1.2)
BUN BLDV-MCNC: 10 MG/DL (ref 6–20)
BUN/CREAT BLD: ABNORMAL (ref 9–20)
CALCIUM SERPL-MCNC: 8.9 MG/DL (ref 8.6–10.4)
CHLORIDE BLD-SCNC: 110 MMOL/L (ref 98–107)
CO2: 19 MMOL/L (ref 20–31)
CREAT SERPL-MCNC: 0.85 MG/DL (ref 0.7–1.2)
DIFFERENTIAL TYPE: ABNORMAL
EOSINOPHILS RELATIVE PERCENT: 3 % (ref 1–4)
GFR AFRICAN AMERICAN: >60 ML/MIN
GFR NON-AFRICAN AMERICAN: >60 ML/MIN
GFR SERPL CREATININE-BSD FRML MDRD: ABNORMAL ML/MIN/{1.73_M2}
GFR SERPL CREATININE-BSD FRML MDRD: ABNORMAL ML/MIN/{1.73_M2}
GLUCOSE BLD-MCNC: 121 MG/DL (ref 70–99)
HCT VFR BLD CALC: 42.2 % (ref 40.7–50.3)
HEMOGLOBIN: 13.2 G/DL (ref 13–17)
IMMATURE GRANULOCYTES: 1 %
LIPASE: 39 U/L (ref 13–60)
LYMPHOCYTES # BLD: 23 % (ref 24–43)
MCH RBC QN AUTO: 32.2 PG (ref 25.2–33.5)
MCHC RBC AUTO-ENTMCNC: 31.3 G/DL (ref 28.4–34.8)
MCV RBC AUTO: 102.9 FL (ref 82.6–102.9)
MONOCYTES # BLD: 7 % (ref 3–12)
NRBC AUTOMATED: 0 PER 100 WBC
PDW BLD-RTO: 13 % (ref 11.8–14.4)
PLATELET # BLD: 263 K/UL (ref 138–453)
PLATELET ESTIMATE: ABNORMAL
PMV BLD AUTO: 11.7 FL (ref 8.1–13.5)
POTASSIUM SERPL-SCNC: 4.5 MMOL/L (ref 3.7–5.3)
PROSTATE SPECIFIC ANTIGEN: 0.23 UG/L
RBC # BLD: 4.1 M/UL (ref 4.21–5.77)
RBC # BLD: ABNORMAL 10*6/UL
SEG NEUTROPHILS: 65 % (ref 36–65)
SEGMENTED NEUTROPHILS ABSOLUTE COUNT: 5.29 K/UL (ref 1.5–8.1)
SODIUM BLD-SCNC: 141 MMOL/L (ref 135–144)
TOTAL PROTEIN: 7.2 G/DL (ref 6.4–8.3)
WBC # BLD: 8 K/UL (ref 3.5–11.3)
WBC # BLD: ABNORMAL 10*3/UL

## 2019-09-03 PROCEDURE — 80053 COMPREHEN METABOLIC PANEL: CPT

## 2019-09-03 PROCEDURE — 99214 OFFICE O/P EST MOD 30 MIN: CPT | Performed by: PHYSICIAN ASSISTANT

## 2019-09-03 PROCEDURE — 83690 ASSAY OF LIPASE: CPT

## 2019-09-03 PROCEDURE — 36415 COLL VENOUS BLD VENIPUNCTURE: CPT

## 2019-09-03 PROCEDURE — 85025 COMPLETE CBC W/AUTO DIFF WBC: CPT

## 2019-09-03 PROCEDURE — G0103 PSA SCREENING: HCPCS

## 2019-09-03 RX ORDER — PANTOPRAZOLE SODIUM 20 MG/1
20 TABLET, DELAYED RELEASE ORAL DAILY
Qty: 30 TABLET | Refills: 3 | Status: SHIPPED | OUTPATIENT
Start: 2019-09-03 | End: 2020-02-03

## 2019-09-03 ASSESSMENT — ENCOUNTER SYMPTOMS
ANAL BLEEDING: 0
CHEST TIGHTNESS: 0
SHORTNESS OF BREATH: 0
DIARRHEA: 0
NAUSEA: 1
WHEEZING: 0
BLOOD IN STOOL: 0
BACK PAIN: 1
CONSTIPATION: 0
ABDOMINAL DISTENTION: 0
RECTAL PAIN: 0
ABDOMINAL PAIN: 1
VOMITING: 0

## 2019-09-03 NOTE — PROGRESS NOTES
[address] PRIMARY CARE  [address] [facility] [address]  Dept: [phone]  Dept Fax: [phone]    Office Progress Note  Date of patient's visit: 9/3/2019  Patient's Name:  Felice Tsia YOB: 1970            LUKE SHEPARD  ================================================================    REASON FOR VISIT/CHIEF COMPLAINT:  3 Month Follow-Up    HISTORY OF PRESENTING ILLNESS:  History was obtained from: patient. Felice Tsai is a 44 y.o. is here for follow up for GERD. Patient had a recent EGD which showed mild gastritis. Patient has been taking Prilosec with minimal relief. He states that over the last couple of days he has had a stabbing pain in the right upper abdomen without radiation. He has had mild nausea but no vomiting. He denies any fevers or chills. He has had normal bowel movements and urinating without difficulty.       Patient Active Problem List   Diagnosis    Type 2 diabetes mellitus with complication, without long-term current use of insulin ([facility]    Chest pain    Essential hypertension    Hypertriglyceridemia    Gastroesophageal reflux disease without esophagitis       Health Maintenance Due   Topic Date Due    Diabetic foot exam  01/26/1978    Diabetic retinal exam  01/26/1978    Diabetic microalbuminuria test  07/11/2019       Allergies   Allergen Reactions    Toradol [Ketorolac Tromethamine] Shortness Of Breath         Current Outpatient Medications   Medication Sig Dispense Refill    pantoprazole (PROTONIX) 20 MG tablet Take 1 tablet by mouth daily 30 tablet 3    metFORMIN (GLUCOPHAGE-XR) 500 MG extended release tablet TAKE ONE TABLET BY MOUTH DAILY BEFORE A MEAL FOR 2 WEEKS, THEN INCREASE THE DOSE TO ONE TABLET TWICE DAILY BEFORE MEALS 60 tablet 2    atorvastatin (LIPITOR) 10 MG tablet TAKE 1 TABLET BY MOUTH ONCE DAILY 30 tablet 2    glipiZIDE (GLUCOTROL) 5 MG tablet TAKE 1 TABLET BY MOUTH TWICE
02/12/2020 (Originally 9/1/2019)    Hepatitis B Vaccine (3 of 3 - Risk 3-dose series) 10/24/2019    A1C test (Diabetic or Prediabetic)  05/24/2020    Lipid screen  05/29/2020    Potassium monitoring  05/29/2020    Creatinine monitoring  05/29/2020    Colon cancer screen colonoscopy  06/25/2029    Pneumococcal 0-64 years Vaccine  Completed

## 2019-09-05 DIAGNOSIS — E11.8 TYPE 2 DIABETES MELLITUS WITH COMPLICATION, WITHOUT LONG-TERM CURRENT USE OF INSULIN (HCC): ICD-10-CM

## 2019-09-05 DIAGNOSIS — E78.1 HYPERTRIGLYCERIDEMIA: ICD-10-CM

## 2019-09-05 DIAGNOSIS — I10 ESSENTIAL HYPERTENSION: ICD-10-CM

## 2019-09-06 RX ORDER — LISINOPRIL 10 MG/1
TABLET ORAL
Qty: 30 TABLET | Refills: 2 | Status: SHIPPED | OUTPATIENT
Start: 2019-09-06 | End: 2019-12-02 | Stop reason: SDUPTHER

## 2019-09-06 RX ORDER — ATORVASTATIN CALCIUM 10 MG/1
TABLET, FILM COATED ORAL
Qty: 30 TABLET | Refills: 2 | Status: SHIPPED | OUTPATIENT
Start: 2019-09-06 | End: 2019-12-31

## 2019-09-06 RX ORDER — METFORMIN HYDROCHLORIDE 500 MG/1
TABLET, EXTENDED RELEASE ORAL
Qty: 60 TABLET | Refills: 2 | Status: SHIPPED | OUTPATIENT
Start: 2019-09-06 | End: 2020-01-02

## 2019-09-06 RX ORDER — FENOFIBRATE 160 MG/1
TABLET ORAL
Qty: 30 TABLET | Refills: 5 | Status: SHIPPED | OUTPATIENT
Start: 2019-09-06 | End: 2020-03-09

## 2019-10-14 ENCOUNTER — NURSE ONLY (OUTPATIENT)
Dept: FAMILY MEDICINE CLINIC | Age: 49
End: 2019-10-14
Payer: COMMERCIAL

## 2019-10-14 VITALS
RESPIRATION RATE: 16 BRPM | WEIGHT: 281.8 LBS | TEMPERATURE: 97.4 F | HEART RATE: 95 BPM | BODY MASS INDEX: 40.34 KG/M2 | DIASTOLIC BLOOD PRESSURE: 90 MMHG | SYSTOLIC BLOOD PRESSURE: 137 MMHG | HEIGHT: 70 IN

## 2019-10-14 DIAGNOSIS — Z23 NEED FOR HEPATITIS B BOOSTER VACCINATION: Primary | ICD-10-CM

## 2019-10-14 PROCEDURE — 90746 HEPB VACCINE 3 DOSE ADULT IM: CPT | Performed by: PHYSICIAN ASSISTANT

## 2019-10-14 PROCEDURE — 90471 IMMUNIZATION ADMIN: CPT | Performed by: PHYSICIAN ASSISTANT

## 2019-11-26 DIAGNOSIS — E11.8 TYPE 2 DIABETES MELLITUS WITH COMPLICATION, WITHOUT LONG-TERM CURRENT USE OF INSULIN (HCC): ICD-10-CM

## 2019-11-26 DIAGNOSIS — K21.9 GASTROESOPHAGEAL REFLUX DISEASE, ESOPHAGITIS PRESENCE NOT SPECIFIED: ICD-10-CM

## 2019-11-27 RX ORDER — METFORMIN HYDROCHLORIDE 500 MG/1
TABLET, EXTENDED RELEASE ORAL
Qty: 60 TABLET | Refills: 2 | Status: SHIPPED | OUTPATIENT
Start: 2019-11-27 | End: 2020-03-09

## 2019-11-27 RX ORDER — FAMOTIDINE 20 MG/1
TABLET, FILM COATED ORAL
Qty: 60 TABLET | Refills: 3 | Status: SHIPPED | OUTPATIENT
Start: 2019-11-27 | End: 2021-03-08 | Stop reason: SDUPTHER

## 2019-12-02 DIAGNOSIS — E11.8 TYPE 2 DIABETES MELLITUS WITH COMPLICATION, WITHOUT LONG-TERM CURRENT USE OF INSULIN (HCC): ICD-10-CM

## 2019-12-02 DIAGNOSIS — I10 ESSENTIAL HYPERTENSION: ICD-10-CM

## 2019-12-02 RX ORDER — GLIPIZIDE 5 MG/1
TABLET ORAL
Qty: 60 TABLET | Refills: 2 | Status: SHIPPED | OUTPATIENT
Start: 2019-12-02 | End: 2020-03-09

## 2019-12-02 RX ORDER — LISINOPRIL 10 MG/1
TABLET ORAL
Qty: 30 TABLET | Refills: 2 | Status: SHIPPED | OUTPATIENT
Start: 2019-12-02 | End: 2020-03-09

## 2019-12-28 DIAGNOSIS — E78.1 HYPERTRIGLYCERIDEMIA: ICD-10-CM

## 2019-12-29 ENCOUNTER — HOSPITAL ENCOUNTER (EMERGENCY)
Facility: CLINIC | Age: 49
Discharge: HOME | End: 2019-12-29
Attending: EMERGENCY MEDICINE
Payer: COMMERCIAL

## 2019-12-29 VITALS
OXYGEN SATURATION: 96 % | SYSTOLIC BLOOD PRESSURE: 141 MMHG | BODY MASS INDEX: 40.09 KG/M2 | DIASTOLIC BLOOD PRESSURE: 100 MMHG | HEIGHT: 70 IN | RESPIRATION RATE: 16 BRPM | TEMPERATURE: 98.1 F | HEART RATE: 105 BPM | WEIGHT: 280 LBS

## 2019-12-29 PROCEDURE — 99283 EMERGENCY DEPT VISIT LOW MDM: CPT

## 2019-12-29 RX ORDER — ALBUTEROL SULFATE 90 UG/1
1-2 AEROSOL, METERED RESPIRATORY (INHALATION) EVERY 4 HOURS PRN
Qty: 1 INHALER | Refills: 0 | Status: SHIPPED | OUTPATIENT
Start: 2019-12-29 | End: 2021-03-08 | Stop reason: SDUPTHER

## 2019-12-29 RX ORDER — AZITHROMYCIN 250 MG/1
TABLET, FILM COATED ORAL
Qty: 1 PACKET | Refills: 0 | Status: SHIPPED | OUTPATIENT
Start: 2019-12-29 | End: 2020-01-02

## 2019-12-29 RX ORDER — BUDESONIDE AND FORMOTEROL FUMARATE DIHYDRATE 160; 4.5 UG/1; UG/1
2 AEROSOL RESPIRATORY (INHALATION) 2 TIMES DAILY
Qty: 1 INHALER | Refills: 0 | Status: SHIPPED | OUTPATIENT
Start: 2019-12-29 | End: 2021-03-08 | Stop reason: SDUPTHER

## 2019-12-29 ASSESSMENT — PAIN DESCRIPTION - LOCATION
LOCATION_2: THROAT
LOCATION: HEAD
LOCATION: RIB CAGE

## 2019-12-29 ASSESSMENT — PAIN DESCRIPTION - ONSET
ONSET: GRADUAL
ONSET_2: GRADUAL
ONSET: SUDDEN

## 2019-12-29 ASSESSMENT — PAIN DESCRIPTION - DESCRIPTORS
DESCRIPTORS: TIGHTNESS
DESCRIPTORS_2: SORE
DESCRIPTORS: ACHING

## 2019-12-29 ASSESSMENT — PAIN DESCRIPTION - FREQUENCY
FREQUENCY: INTERMITTENT
FREQUENCY: CONTINUOUS

## 2019-12-29 ASSESSMENT — PAIN SCALES - GENERAL: PAINLEVEL_OUTOF10: 4

## 2019-12-29 ASSESSMENT — PAIN DESCRIPTION - PAIN TYPE: TYPE: ACUTE PAIN

## 2019-12-29 ASSESSMENT — PAIN DESCRIPTION - DURATION: DURATION_2: CONTINUOUS

## 2019-12-29 ASSESSMENT — PAIN DESCRIPTION - INTENSITY: RATING_2: 9

## 2019-12-29 ASSESSMENT — PAIN DESCRIPTION - ORIENTATION
ORIENTATION_2: UPPER;MID;LOWER
ORIENTATION: LEFT;RIGHT
ORIENTATION: LEFT

## 2019-12-29 ASSESSMENT — PAIN DESCRIPTION - PROGRESSION
CLINICAL_PROGRESSION_2: GRADUALLY WORSENING
CLINICAL_PROGRESSION: GRADUALLY WORSENING
CLINICAL_PROGRESSION: GRADUALLY WORSENING

## 2019-12-29 NOTE — ED PROVIDER NOTES
[address] ED  EMERGENCY DEPARTMENT ENCOUNTER      Pt Name: José Antonio Limon  MRN: [id number]  [place] 1970  Date of evaluation: 12/29/2019  Provider: Nakul Herrera MD    [address] [place] [address]     Chief Complaint   Patient presents with    Cough     Symptoms started about two weeks ago    Pharyngitis    Headache    Shortness of Breath    Rib Pain    Eye Problem     Itchy and watery         HISTORY OF PRESENT ILLNESS   (Location/Symptom, Timing/Onset, Context/Setting,Quality, Duration, Modifying Factors, Severity)  Note limiting factors. José Antonio Limon is a 44 y.o. male who presents to the emergency department with a chief complaint of paroxysmal cough for over 2 weeks duration starting up when he was in [place]. Patient denies fever or purulent sputum production or hemoptysis. He was also having hoarseness of voice which has improved. He is a cigarette smoker. HPI    Nursing Notes werereviewed. REVIEW OF SYSTEMS    (2-9 systems for level 4, 10 or more for level 5)     Review of Systems    Except as noted above the remainder of the review of systems was reviewed and negative.        PAST MEDICAL HISTORY     Past Medical History:   Diagnosis Date    CAD (coronary artery disease)     Diabetes mellitus ([facility]     Essential hypertension 10/15/2018    Gastroesophageal reflux disease without esophagitis     Hypertriglyceridemia 10/15/2018    Type 2 diabetes mellitus with complication, without long-term current use of insulin ([facility] 7/11/2018         SURGICALHISTORY       Past Surgical History:   Procedure Laterality Date    COLONOSCOPY N/A 6/25/2019    COLORECTAL CANCER SCREENING, NOT HIGH RISK performed by Tariq Walton MD at [address] [place] [address] N/A 6/25/2019    EGD BIOPSY performed by Tariq Walton MD at [facility]       Discharge Medication List as of 12/29/2019  3:49 PM      CONTINUE these pertinent family history. SOCIAL HISTORY       Social History     Socioeconomic History    Marital status:      Spouse name: None    Number of children: None    Years of education: None    Highest education level: None   Occupational History    None   Social Needs    Financial resource strain: None    Food insecurity:     Worry: None     Inability: None    Transportation needs:     Medical: None     Non-medical: None   Tobacco Use    Smoking status: Current Every Day Smoker     Packs/day: 1.00     Years: 0.00     Pack years: 0.00     Types: Cigarettes    Smokeless tobacco: Former User   Substance and Sexual Activity    Alcohol use: Yes     Comment: few beers ocasionally    Drug use: No    Sexual activity: Yes     Partners: Female   Lifestyle    Physical activity:     Days per week: None     Minutes per session: None    Stress: None   Relationships    Social connections:     Talks on phone: None     Gets together: None     Attends Che[employer] service: None     Active member of club or organization: None     Attends meetings of clubs or organizations: None     Relationship status: None    Intimate partner violence:     Fear of current or ex partner: None     Emotionally abused: None     Physically abused: None     Forced sexual activity: None   Other Topics Concern    None   Social History Narrative    None       SCREENINGS             PHYSICAL EXAM    (up to 7 for level 4, 8 or more for level 5)     ED Triage Vitals [12/29/19 1527]   BP Temp Temp Source Pulse Resp SpO2 Height Weight   (!) 141/100 98.1 °F (36.7 °C) Oral 105 16 96 % 5' 10\" (1.778 m) 280 lb (127 kg)       Physical Exam  Vitals signs reviewed. Constitutional:       General: He is not in acute distress. Appearance: Normal appearance. HENT:      Head: Normocephalic. Nose: Nose normal.      Mouth/Throat:      Mouth: Mucous membranes are moist.   Eyes:      Extraocular Movements: Extraocular movements intact. Neck:      Musculoskeletal: Neck supple. Cardiovascular:      Rate and Rhythm: Regular rhythm. Tachycardia present. Heart sounds: No murmur. Pulmonary:      Effort: No respiratory distress. Breath sounds: Rhonchi present. No rales. Abdominal:      Palpations: Abdomen is soft. Tenderness: There is no tenderness. Musculoskeletal: Normal range of motion. Skin:     General: Skin is warm and dry. Neurological:      General: No focal deficit present. Mental Status: He is alert and oriented to person, place, and time. DIAGNOSTIC RESULTS     EKG: All EKG's are interpreted by the Emergency Department Physician who either signs orCo-signs this chart in the absence of a cardiologist.    RADIOLOGY:   Non-plain film images such as CT, Ultrasound and MRI are read by the radiologist. Plain radiographic images are visualized and preliminarily interpreted by the emergency physician with the below findings:    Interpretation per the Radiologist below, ifavailable at the time of this note:    No orders to display         ED BEDSIDE ULTRASOUND:   Performed by ED Physician - none    LABS:  Labs Reviewed - No data to display    All other labs were within normal range ornot returned as of this dictation. EMERGENCY DEPARTMENT COURSE and DIFFERENTIAL DIAGNOSIS/MDM:   Vitals:    Vitals:    12/29/19 1527   BP: (!) 141/100   Pulse: 105   Resp: 16   Temp: 98.1 °F (36.7 °C)   TempSrc: Oral   SpO2: 96%   Weight: 127 kg (280 lb)   Height: 5' 10\" (1.778 m)            Based on the clinical impression of bronchitis patient is placed on prednisone, azithromycin and Symbicort. MDM    CONSULTS:  None    PROCEDURES:  Unlessotherwise noted below, none     Procedures    FINAL IMPRESSION      1.  Bronchitis          DISPOSITION/PLAN   DISPOSITION Decision To Discharge 12/29/2019 03:47:51 PM      PATIENT REFERRED TO:  Codey Guerra PA-C  [address] [place] [address]  [phone]    In 3

## 2019-12-31 RX ORDER — ATORVASTATIN CALCIUM 10 MG/1
TABLET, FILM COATED ORAL
Qty: 30 TABLET | Refills: 0 | Status: SHIPPED | OUTPATIENT
Start: 2019-12-31 | End: 2020-02-03

## 2020-01-02 ENCOUNTER — OFFICE VISIT (OUTPATIENT)
Dept: FAMILY MEDICINE CLINIC | Age: 50
End: 2020-01-02
Payer: COMMERCIAL

## 2020-01-02 VITALS
WEIGHT: 282.6 LBS | HEART RATE: 111 BPM | DIASTOLIC BLOOD PRESSURE: 94 MMHG | HEIGHT: 70 IN | BODY MASS INDEX: 40.46 KG/M2 | SYSTOLIC BLOOD PRESSURE: 141 MMHG | TEMPERATURE: 98.1 F | RESPIRATION RATE: 16 BRPM

## 2020-01-02 LAB — HBA1C MFR BLD: 8.6 %

## 2020-01-02 PROCEDURE — 83036 HEMOGLOBIN GLYCOSYLATED A1C: CPT | Performed by: PHYSICIAN ASSISTANT

## 2020-01-02 PROCEDURE — 99213 OFFICE O/P EST LOW 20 MIN: CPT | Performed by: PHYSICIAN ASSISTANT

## 2020-01-02 RX ORDER — AMOXICILLIN AND CLAVULANATE POTASSIUM 875; 125 MG/1; MG/1
1 TABLET, FILM COATED ORAL 2 TIMES DAILY
Qty: 20 TABLET | Refills: 0 | Status: SHIPPED | OUTPATIENT
Start: 2020-01-02 | End: 2020-01-12

## 2020-01-02 RX ORDER — PREDNISONE 20 MG/1
20 TABLET ORAL 2 TIMES DAILY
Qty: 10 TABLET | Refills: 0 | Status: SHIPPED | OUTPATIENT
Start: 2020-01-02 | End: 2020-01-07

## 2020-01-02 ASSESSMENT — PATIENT HEALTH QUESTIONNAIRE - PHQ9
2. FEELING DOWN, DEPRESSED OR HOPELESS: 0
SUM OF ALL RESPONSES TO PHQ QUESTIONS 1-9: 0
1. LITTLE INTEREST OR PLEASURE IN DOING THINGS: 0
SUM OF ALL RESPONSES TO PHQ QUESTIONS 1-9: 0
SUM OF ALL RESPONSES TO PHQ9 QUESTIONS 1 & 2: 0

## 2020-01-02 NOTE — PROGRESS NOTES
09/03/2020 (Originally 1/26/1983)    A1C test (Diabetic or Prediabetic)  05/24/2020    Lipid screen  05/29/2020    Potassium monitoring  09/03/2020    Creatinine monitoring  09/03/2020    Colon cancer screen colonoscopy  06/25/2029    Hepatitis B vaccine  Completed    Pneumococcal 0-64 years Vaccine  Completed

## 2020-01-03 ASSESSMENT — ENCOUNTER SYMPTOMS
EYE REDNESS: 0
COUGH: 1
CONSTIPATION: 0
RHINORRHEA: 1
FACIAL SWELLING: 0
SINUS PAIN: 0
EYE DISCHARGE: 0
CHEST TIGHTNESS: 0
WHEEZING: 0
EYE ITCHING: 0
VOMITING: 0
TROUBLE SWALLOWING: 0
SORE THROAT: 1
BACK PAIN: 0
NAUSEA: 0
SINUS PRESSURE: 1
DIARRHEA: 0
SHORTNESS OF BREATH: 0

## 2020-01-03 NOTE — PROGRESS NOTES
tablet Take 1 tablet by mouth 2 times daily for 5 days 10 tablet 0    atorvastatin (LIPITOR) 10 MG tablet TAKE 1 TABLET BY MOUTH ONCE DAILY 30 tablet 0    budesonide-formoterol (SYMBICORT) 160-4.5 MCG/ACT AERO Inhale 2 puffs into the lungs 2 times daily 1 Inhaler 0    albuterol sulfate HFA (PROVENTIL HFA) 108 (90 Base) MCG/ACT inhaler Inhale 1-2 puffs into the lungs every 4 hours as needed for Wheezing or Shortness of Breath (Space out to every 6 hours as symptoms improve) Space out to every 6 hours as symptoms improve. 1 Inhaler 0    lisinopril (PRINIVIL;ZESTRIL) 10 MG tablet TAKE 1 TABLET BY MOUTH ONCE DAILY 30 tablet 2    glipiZIDE (GLUCOTROL) 5 MG tablet TAKE 1 TABLET BY MOUTH TWICE DAILY 60 tablet 2    metFORMIN (GLUCOPHAGE-XR) 500 MG extended release tablet TAKE ONE TABLET BY MOUTH DAILY BEFORE A MEAL FOR 2 WEEKS, THEN INCREASE THE DOSE TO ONE TABLET TWICE DAILY BEFORE MEALS 60 tablet 2    famotidine (PEPCID) 20 MG tablet TAKE 1 TABLET BY MOUTH TWICE DAILY 60 tablet 3    fenofibrate 160 MG tablet TAKE 1 TABLET BY MOUTH ONCE DAILY 30 tablet 5    pantoprazole (PROTONIX) 20 MG tablet Take 1 tablet by mouth daily 30 tablet 3    SUPREP BOWEL PREP KIT 17.5-3.13-1.6 GM/177ML SOLN Take as directed - dispense one Kit 1 kit 0     No current facility-administered medications for this visit. Social History     Tobacco Use    Smoking status: Current Every Day Smoker     Packs/day: 1.00     Years: 0.00     Pack years: 0.00     Types: Cigarettes    Smokeless tobacco: Former User   Substance Use Topics    Alcohol use: Yes     Comment: few beers ocasionally    Drug use: No       No family history on file. Review of Systems   Constitutional: Negative for appetite change, chills, diaphoresis, fatigue, fever and unexpected weight change. HENT: Positive for congestion, postnasal drip, rhinorrhea, sinus pressure and sore throat.  Negative for ear discharge, ear pain, facial swelling, hearing loss, sinus deficit present. Mental Status: He is alert and oriented to person, place, and time. Psychiatric:         Mood and Affect: Mood normal.         Behavior: Behavior normal. Behavior is cooperative. Thought Content: Thought content normal.         Judgment: Judgment normal.         Vitals:    01/02/20 1054 01/02/20 1115   BP: (!) 150/94 (!) 141/94   Site: Right Upper Arm Left Upper Arm   Position: Sitting Sitting   Cuff Size: Large Adult Large Adult   Pulse: 111    Resp: 16    Temp: 98.1 °F (36.7 °C)    TempSrc: Oral    Weight: 282 lb 9.6 oz (128.2 kg)    Height: 5' 10\" (1.778 m)      BP Readings from Last 3 Encounters:   01/02/20 (!) 141/94   12/29/19 (!) 141/100   10/14/19 (!) 137/90              DIAGNOSTIC FINDINGS:  CBC:  Lab Results   Component Value Date    WBC 8.0 09/03/2019    HGB 13.2 09/03/2019     09/03/2019       BMP:    Lab Results   Component Value Date     09/03/2019    K 4.5 09/03/2019     09/03/2019    CO2 19 09/03/2019    BUN 10 09/03/2019    CREATININE 0.85 09/03/2019    GLUCOSE 121 09/03/2019         FASTING LIPID PANEL:  Lab Results   Component Value Date    CHOL 223 (H) 07/12/2018    HDL 37 (L) 05/29/2019    TRIG 360 (H) 07/12/2018       Results for orders placed or performed in visit on 01/02/20   POCT glycosylated hemoglobin (Hb A1C)   Result Value Ref Range    Hemoglobin A1C 8.6 %         ASSESSMENT AND PLAN:   Diagnosis Orders   1. Type 2 diabetes mellitus with complication, without long-term current use of insulin ([facility])  POCT glycosylated hemoglobin (Hb A1C)   2. Need for influenza vaccination     3. Acute bacterial sinusitis  amoxicillin-clavulanate (AUGMENTIN) 875-125 MG per tablet    predniSONE (DELTASONE) 20 MG tablet       FOLLOW UP AND INSTRUCTIONS:  Return in about 3 months (around 4/2/2020), or if symptoms worsen or fail to improve. · Discussed use, benefit, and side effects of prescribed medications. Barriers to medication compliance addressed.

## 2020-02-03 ENCOUNTER — NURSE ONLY (OUTPATIENT)
Dept: FAMILY MEDICINE CLINIC | Age: 50
End: 2020-02-03

## 2020-02-03 VITALS — DIASTOLIC BLOOD PRESSURE: 81 MMHG | SYSTOLIC BLOOD PRESSURE: 126 MMHG

## 2020-02-03 RX ORDER — ATORVASTATIN CALCIUM 10 MG/1
TABLET, FILM COATED ORAL
Qty: 30 TABLET | Refills: 0 | Status: SHIPPED | OUTPATIENT
Start: 2020-02-03 | End: 2020-03-09

## 2020-02-03 RX ORDER — PANTOPRAZOLE SODIUM 20 MG/1
TABLET, DELAYED RELEASE ORAL
Qty: 30 TABLET | Refills: 0 | Status: SHIPPED | OUTPATIENT
Start: 2020-02-03 | End: 2020-03-09

## 2020-02-03 NOTE — TELEPHONE ENCOUNTER
Medication request pending. Please advise. Thank you. Last visit: 1/02/2020  Last Med refill: 12/31/19      Next Visit Date:  Future Appointments   Date Time Provider John Gordon   5/4/2020 10:30 AM On license of [facility] [address] Maintenance   Topic Date Due    Diabetic foot exam  01/26/1978    Diabetic retinal exam  01/26/1978    Diabetic microalbuminuria test  07/11/2019    Flu vaccine (1) 09/01/2019    DTaP/Tdap/Td vaccine (1 - Tdap) 02/05/2020 (Originally 1/26/1979)    Shingles Vaccine (1 of 2) 02/05/2020 (Originally 1/26/2018)    HIV screen  09/03/2020 (Originally 1/26/1983)    Lipid screen  05/29/2020    Potassium monitoring  09/03/2020    Creatinine monitoring  09/03/2020    A1C test (Diabetic or Prediabetic)  01/02/2021    Colon cancer screen colonoscopy  06/25/2029    Hepatitis B vaccine  Completed    Pneumococcal 0-64 years Vaccine  Completed       Hemoglobin A1C (%)   Date Value   01/02/2020 8.6   05/24/2019 6.3   10/15/2018 6.2             ( goal A1C is < 7)   Microalb/Crt.  Ratio (mcg/mg creat)   Date Value   07/11/2018 CANNOT BE CALCULATED     LDL Cholesterol (mg/dL)   Date Value   05/29/2019 95   07/12/2018 119       (goal LDL is <100)   AST (U/L)   Date Value   09/03/2019 16     ALT (U/L)   Date Value   09/03/2019 18     BUN (mg/dL)   Date Value   09/03/2019 10     BP Readings from Last 3 Encounters:   02/03/20 126/81   01/02/20 (!) 141/94   12/29/19 (!) 141/100          (goal 120/80)    All Future Testing planned in [facility]  Lab Frequency Next Occurrence   US GALLBLADDER RUQ Once 04/04/2020               Patient Active Problem List:     Type 2 diabetes mellitus with complication, without long-term current use of insulin (HCC)     Chest pain     Essential hypertension     Hypertriglyceridemia     Gastroesophageal reflux disease without esophagitis

## 2020-02-07 ENCOUNTER — NURSE TRIAGE (OUTPATIENT)
Dept: OTHER | Facility: CLINIC | Age: 50
End: 2020-02-07

## 2020-02-07 NOTE — TELEPHONE ENCOUNTER
Reason for Disposition   Passed out (i.e., fainted, collapsed and was not responding)    Protocols used: COUGH-ADULT-OH    Patient called [facility]) to schedule appointment, with red flag complaint, transferred to RN access for triage. Patient's wife called in with  to state having cough. Reports symptoms began about two days ago. States coughing episodes have led patient to pass out twice last night. Denies any shortness of breath at the time of the call. Patient reports symptoms as documented above. Patient informed of disposition. Care advice as documented. Instructed patient to call back with worsening symptoms. Patient verbalized understanding and denies any further questions/concerns. Please do not respond to the triage nurse through this encounter. Any subsequent communication should be directly with the patient.

## 2020-03-09 RX ORDER — ATORVASTATIN CALCIUM 10 MG/1
TABLET, FILM COATED ORAL
Qty: 30 TABLET | Refills: 0 | Status: SHIPPED | OUTPATIENT
Start: 2020-03-09 | End: 2020-05-04

## 2020-03-09 RX ORDER — GLIPIZIDE 5 MG/1
TABLET ORAL
Qty: 60 TABLET | Refills: 0 | Status: SHIPPED | OUTPATIENT
Start: 2020-03-09 | End: 2020-05-04

## 2020-03-09 RX ORDER — METFORMIN HYDROCHLORIDE 500 MG/1
TABLET, EXTENDED RELEASE ORAL
Qty: 60 TABLET | Refills: 0 | Status: SHIPPED | OUTPATIENT
Start: 2020-03-09 | End: 2020-05-04

## 2020-03-09 RX ORDER — LISINOPRIL 10 MG/1
TABLET ORAL
Qty: 30 TABLET | Refills: 0 | Status: SHIPPED | OUTPATIENT
Start: 2020-03-09 | End: 2020-05-04

## 2020-03-09 RX ORDER — FENOFIBRATE 160 MG/1
TABLET ORAL
Qty: 90 TABLET | Refills: 0 | Status: SHIPPED | OUTPATIENT
Start: 2020-03-09 | End: 2020-05-04

## 2020-03-09 RX ORDER — PANTOPRAZOLE SODIUM 20 MG/1
TABLET, DELAYED RELEASE ORAL
Qty: 30 TABLET | Refills: 0 | Status: SHIPPED | OUTPATIENT
Start: 2020-03-09 | End: 2020-05-04

## 2020-05-04 RX ORDER — GLIPIZIDE 5 MG/1
TABLET ORAL
Qty: 60 TABLET | Refills: 0 | Status: SHIPPED | OUTPATIENT
Start: 2020-05-04 | End: 2020-06-02

## 2020-05-04 RX ORDER — ATORVASTATIN CALCIUM 10 MG/1
TABLET, FILM COATED ORAL
Qty: 30 TABLET | Refills: 0 | Status: SHIPPED | OUTPATIENT
Start: 2020-05-04 | End: 2020-06-03 | Stop reason: SDUPTHER

## 2020-05-04 RX ORDER — METFORMIN HYDROCHLORIDE 500 MG/1
TABLET, EXTENDED RELEASE ORAL
Qty: 60 TABLET | Refills: 0 | Status: SHIPPED | OUTPATIENT
Start: 2020-05-04 | End: 2020-06-02

## 2020-05-04 RX ORDER — FENOFIBRATE 160 MG/1
TABLET ORAL
Qty: 90 TABLET | Refills: 0 | Status: SHIPPED | OUTPATIENT
Start: 2020-05-04 | End: 2020-08-20

## 2020-05-04 RX ORDER — PANTOPRAZOLE SODIUM 20 MG/1
TABLET, DELAYED RELEASE ORAL
Qty: 30 TABLET | Refills: 0 | Status: SHIPPED | OUTPATIENT
Start: 2020-05-04 | End: 2020-06-02

## 2020-05-04 RX ORDER — LISINOPRIL 10 MG/1
TABLET ORAL
Qty: 30 TABLET | Refills: 0 | Status: SHIPPED | OUTPATIENT
Start: 2020-05-04 | End: 2020-06-02

## 2020-06-02 RX ORDER — GLIPIZIDE 5 MG/1
TABLET ORAL
Qty: 60 TABLET | Refills: 0 | Status: SHIPPED | OUTPATIENT
Start: 2020-06-02 | End: 2020-07-08

## 2020-06-02 RX ORDER — PANTOPRAZOLE SODIUM 20 MG/1
TABLET, DELAYED RELEASE ORAL
Qty: 30 TABLET | Refills: 0 | Status: SHIPPED | OUTPATIENT
Start: 2020-06-02 | End: 2020-07-08

## 2020-06-02 RX ORDER — LISINOPRIL 10 MG/1
TABLET ORAL
Qty: 30 TABLET | Refills: 0 | Status: SHIPPED | OUTPATIENT
Start: 2020-06-02 | End: 2020-07-08

## 2020-06-02 RX ORDER — METFORMIN HYDROCHLORIDE 500 MG/1
TABLET, EXTENDED RELEASE ORAL
Qty: 60 TABLET | Refills: 0 | Status: SHIPPED | OUTPATIENT
Start: 2020-06-02 | End: 2020-07-08

## 2020-06-03 RX ORDER — ATORVASTATIN CALCIUM 10 MG/1
TABLET, FILM COATED ORAL
Qty: 30 TABLET | Refills: 0 | Status: SHIPPED | OUTPATIENT
Start: 2020-06-03 | End: 2020-09-23

## 2020-07-08 RX ORDER — PANTOPRAZOLE SODIUM 20 MG/1
TABLET, DELAYED RELEASE ORAL
Qty: 30 TABLET | Refills: 0 | Status: SHIPPED | OUTPATIENT
Start: 2020-07-08 | End: 2020-08-20

## 2020-07-08 RX ORDER — METFORMIN HYDROCHLORIDE 500 MG/1
TABLET, EXTENDED RELEASE ORAL
Qty: 60 TABLET | Refills: 0 | Status: SHIPPED | OUTPATIENT
Start: 2020-07-08 | End: 2020-08-20

## 2020-07-08 RX ORDER — LISINOPRIL 10 MG/1
TABLET ORAL
Qty: 30 TABLET | Refills: 0 | Status: SHIPPED | OUTPATIENT
Start: 2020-07-08 | End: 2020-08-20

## 2020-07-08 RX ORDER — GLIPIZIDE 5 MG/1
TABLET ORAL
Qty: 60 TABLET | Refills: 0 | Status: SHIPPED | OUTPATIENT
Start: 2020-07-08 | End: 2020-08-20

## 2020-07-08 NOTE — TELEPHONE ENCOUNTER
Next Visit Date:  Future Appointments   Date Time Provider John Gordon   8/17/2020  8:45 AM Timothy Reza Burkitt [facility] [address] Maintenance   Topic Date Due    Diabetic foot exam  01/26/1978    Diabetic retinal exam  01/26/1978    DTaP/Tdap/Td vaccine (1 - Tdap) 01/26/1987    Shingles Vaccine (1 of 2) 01/26/2018    Diabetic microalbuminuria test  07/11/2019    Lipid screen  05/29/2020    HIV screen  09/03/2020 (Originally 1/26/1983)    Flu vaccine (1) 09/01/2020    Potassium monitoring  09/03/2020    Creatinine monitoring  09/03/2020    A1C test (Diabetic or Prediabetic)  01/02/2021    Colon cancer screen colonoscopy  06/25/2029    Hepatitis B vaccine  Completed    Pneumococcal 0-64 years Vaccine  Completed    Hepatitis A vaccine  Aged Out    Hib vaccine  Aged Out    Meningococcal (ACWY) vaccine  Aged Out       Hemoglobin A1C (%)   Date Value   01/02/2020 8.6   05/24/2019 6.3   10/15/2018 6.2             ( goal A1C is < 7)   Microalb/Crt.  Ratio (mcg/mg creat)   Date Value   07/11/2018 CANNOT BE CALCULATED     LDL Cholesterol (mg/dL)   Date Value   05/29/2019 95   07/12/2018 119       (goal LDL is <100)   AST (U/L)   Date Value   09/03/2019 16     ALT (U/L)   Date Value   09/03/2019 18     BUN (mg/dL)   Date Value   09/03/2019 10     BP Readings from Last 3 Encounters:   02/03/20 126/81   01/02/20 (!) 141/94   12/29/19 (!) 141/100          (goal 120/80)    All Future Testing planned in [facility]  Lab Frequency Next Occurrence   US GALLBLADDER RUQ Once 04/04/2020               Patient Active Problem List:     Type 2 diabetes mellitus with complication, without long-term current use of insulin (HCC)     Chest pain     Essential hypertension     Hypertriglyceridemia     Gastroesophageal reflux disease without esophagitis

## 2020-08-20 RX ORDER — METFORMIN HYDROCHLORIDE 500 MG/1
TABLET, EXTENDED RELEASE ORAL
Qty: 60 TABLET | Refills: 0 | Status: SHIPPED | OUTPATIENT
Start: 2020-08-20 | End: 2020-09-23

## 2020-08-20 RX ORDER — PANTOPRAZOLE SODIUM 20 MG/1
TABLET, DELAYED RELEASE ORAL
Qty: 30 TABLET | Refills: 0 | Status: SHIPPED | OUTPATIENT
Start: 2020-08-20 | End: 2020-09-23

## 2020-08-20 RX ORDER — LISINOPRIL 10 MG/1
TABLET ORAL
Qty: 30 TABLET | Refills: 0 | Status: SHIPPED | OUTPATIENT
Start: 2020-08-20 | End: 2020-09-23

## 2020-08-20 RX ORDER — FENOFIBRATE 160 MG/1
TABLET ORAL
Qty: 90 TABLET | Refills: 0 | Status: SHIPPED | OUTPATIENT
Start: 2020-08-20 | End: 2020-09-23

## 2020-08-20 RX ORDER — GLIPIZIDE 5 MG/1
TABLET ORAL
Qty: 60 TABLET | Refills: 0 | Status: SHIPPED | OUTPATIENT
Start: 2020-08-20 | End: 2020-09-23

## 2020-08-20 NOTE — TELEPHONE ENCOUNTER
Next Visit Date:  No future appointments. Health Maintenance   Topic Date Due    Diabetic foot exam  01/26/1978    Diabetic retinal exam  01/26/1978    DTaP/Tdap/Td vaccine (1 - Tdap) 01/26/1987    Shingles Vaccine (1 of 2) 01/26/2018    Diabetic microalbuminuria test  07/11/2019    Lipid screen  05/29/2020    Potassium monitoring  09/03/2020    Creatinine monitoring  09/03/2020    HIV screen  09/03/2020 (Originally 1/26/1983)    Flu vaccine (1) 09/01/2020    A1C test (Diabetic or Prediabetic)  01/02/2021    Colon cancer screen colonoscopy  06/25/2029    Hepatitis B vaccine  Completed    Pneumococcal 0-64 years Vaccine  Completed    Hepatitis A vaccine  Aged Out    Hib vaccine  Aged Out    Meningococcal (ACWY) vaccine  Aged Out       Hemoglobin A1C (%)   Date Value   01/02/2020 8.6   05/24/2019 6.3   10/15/2018 6.2             ( goal A1C is < 7)   Microalb/Crt.  Ratio (mcg/mg creat)   Date Value   07/11/2018 CANNOT BE CALCULATED     LDL Cholesterol (mg/dL)   Date Value   05/29/2019 95   07/12/2018 119       (goal LDL is <100)   AST (U/L)   Date Value   09/03/2019 16     ALT (U/L)   Date Value   09/03/2019 18     BUN (mg/dL)   Date Value   09/03/2019 10     BP Readings from Last 3 Encounters:   02/03/20 126/81   01/02/20 (!) 141/94   12/29/19 (!) 141/100          (goal 120/80)    All Future Testing planned in [facility]  Lab Frequency Next Occurrence   US GALLBLADDER RUQ Once 04/04/2020               Patient Active Problem List:     Type 2 diabetes mellitus with complication, without long-term current use of insulin (HCC)     Chest pain     Essential hypertension     Hypertriglyceridemia     Gastroesophageal reflux disease without esophagitis

## 2020-09-22 ENCOUNTER — OFFICE VISIT (OUTPATIENT)
Dept: FAMILY MEDICINE CLINIC | Age: 50
End: 2020-09-22
Payer: COMMERCIAL

## 2020-09-22 VITALS
DIASTOLIC BLOOD PRESSURE: 88 MMHG | SYSTOLIC BLOOD PRESSURE: 131 MMHG | BODY MASS INDEX: 39.65 KG/M2 | TEMPERATURE: 97.3 F | RESPIRATION RATE: 16 BRPM | HEIGHT: 70 IN | WEIGHT: 277 LBS | HEART RATE: 94 BPM

## 2020-09-22 LAB — HBA1C MFR BLD: 9.3 %

## 2020-09-22 PROCEDURE — 83036 HEMOGLOBIN GLYCOSYLATED A1C: CPT | Performed by: PHYSICIAN ASSISTANT

## 2020-09-22 PROCEDURE — 99214 OFFICE O/P EST MOD 30 MIN: CPT | Performed by: PHYSICIAN ASSISTANT

## 2020-09-22 PROCEDURE — 93000 ELECTROCARDIOGRAM COMPLETE: CPT | Performed by: PHYSICIAN ASSISTANT

## 2020-09-22 RX ORDER — DULAGLUTIDE 0.75 MG/.5ML
0.75 INJECTION, SOLUTION SUBCUTANEOUS WEEKLY
Qty: 4 PEN | Refills: 1 | Status: SHIPPED | OUTPATIENT
Start: 2020-09-22 | End: 2021-02-25 | Stop reason: SDUPTHER

## 2020-09-22 SDOH — ECONOMIC STABILITY: FOOD INSECURITY: WITHIN THE PAST 12 MONTHS, YOU WORRIED THAT YOUR FOOD WOULD RUN OUT BEFORE YOU GOT MONEY TO BUY MORE.: NEVER TRUE

## 2020-09-22 SDOH — ECONOMIC STABILITY: TRANSPORTATION INSECURITY
IN THE PAST 12 MONTHS, HAS THE LACK OF TRANSPORTATION KEPT YOU FROM MEDICAL APPOINTMENTS OR FROM GETTING MEDICATIONS?: NO

## 2020-09-22 SDOH — ECONOMIC STABILITY: TRANSPORTATION INSECURITY
IN THE PAST 12 MONTHS, HAS LACK OF TRANSPORTATION KEPT YOU FROM MEETINGS, WORK, OR FROM GETTING THINGS NEEDED FOR DAILY LIVING?: NO

## 2020-09-22 SDOH — ECONOMIC STABILITY: FOOD INSECURITY: WITHIN THE PAST 12 MONTHS, THE FOOD YOU BOUGHT JUST DIDN'T LAST AND YOU DIDN'T HAVE MONEY TO GET MORE.: NEVER TRUE

## 2020-09-22 SDOH — ECONOMIC STABILITY: INCOME INSECURITY: HOW HARD IS IT FOR YOU TO PAY FOR THE VERY BASICS LIKE FOOD, HOUSING, MEDICAL CARE, AND HEATING?: NOT HARD AT ALL

## 2020-09-22 ASSESSMENT — ENCOUNTER SYMPTOMS
CONSTIPATION: 0
COUGH: 0
ABDOMINAL PAIN: 0
RECTAL PAIN: 0
SHORTNESS OF BREATH: 0
VOMITING: 0
NAUSEA: 0
BACK PAIN: 0
WHEEZING: 0
ABDOMINAL DISTENTION: 0
CHEST TIGHTNESS: 0
BLOOD IN STOOL: 0
ANAL BLEEDING: 0
DIARRHEA: 0

## 2020-09-22 NOTE — PROGRESS NOTES
[address] PRIMARY CARE   Jass Don [facility]  Dept: [phone]  Dept Fax: [phone]    Office Progress Note  Date of patient's visit: 9/22/2020  Patient's Name:  Tuan Doshi YOB: 1970            LUKE SHEPARD  ================================================================    REASON FOR VISIT/CHIEF COMPLAINT:  Hypertension; Diabetes; and Health Maintenance (pt refused flu, Tdap, and ppsv23)    HISTORY OF PRESENTING ILLNESS:  History was obtained from: patient. Tuan Doshi is a 44 y.o. is here for follow up for hypertension, diabetes, hypercholesterolemia. Patient states that approximately 1 month ago he was having some increased \"chest pain. \"  Patient states that he was using a lot of Tums and symptoms resolved so he assumed it was from GERD. Patient states that he has not had any chest pain since then. He has had some chronic intermittent shortness of breath which is unchanged. He states that he is still smoking at least a pack of cigarettes daily. He has some intermittent productive cough of yellow sputum. He denies any fevers or chills or weight loss. Patient did have an echocardiogram and stress test in 2018. Patient had no follow-up with cardiology at that time. Blood pressure is well controlled. Blood sugar has been elevated consistently and patient's A1c is 9.2 today. Patient is agreeable to adding Trulicity to his metformin and glipizide. He is not interested in any insulin. Patient states that he has noticed that he is \"bleeding for a long period of time if I get scratched. \"  Patient is not taking any aspirin or other blood thinners. He denies any blood in the stools or urine. Patient has had a colonoscopy in the last couple years with no acute findings.       Patient Active Problem List   Diagnosis    Type 2 diabetes mellitus with complication, without long-term current use of insulin ([facility] [address]    as needed for Wheezing or Shortness of Breath (Space out to every 6 hours as symptoms improve) Space out to every 6 hours as symptoms improve. (Patient not taking: Reported on 9/22/2020) 1 Inhaler 0     No current facility-administered medications for this visit. Social History     Tobacco Use    Smoking status: Current Every Day Smoker     Packs/day: 1.00     Years: 0.00     Pack years: 0.00     Types: Cigarettes    Smokeless tobacco: Former User   Substance Use Topics    Alcohol use: Yes     Comment: few beers ocasionally    Drug use: No       History reviewed. No pertinent family history. Review of Systems   Constitutional: Negative for appetite change, chills, diaphoresis, fatigue, fever and unexpected weight change. Respiratory: Negative for cough, chest tightness, shortness of breath and wheezing. Cardiovascular: Positive for chest pain ( episode one month ago currently asx). Negative for palpitations and leg swelling. Gastrointestinal: Negative for abdominal distention, abdominal pain, anal bleeding, blood in stool, constipation, diarrhea, nausea, rectal pain and vomiting. Genitourinary: Negative for decreased urine volume, difficulty urinating, dysuria, flank pain, frequency, hematuria and urgency. Musculoskeletal: Negative for back pain and myalgias. Neurological: Negative for dizziness, syncope, weakness, light-headedness, numbness and headaches. Hematological: Negative for adenopathy. Bruises/bleeds easily. Psychiatric/Behavioral: Negative. Physical Exam  Vitals signs and nursing note reviewed. Constitutional:       General: He is not in acute distress. Appearance: Normal appearance. He is well-developed and well-groomed. He is not ill-appearing, toxic-appearing or diaphoretic. HENT:      Head: Normocephalic and atraumatic.       Right Ear: Tympanic membrane, ear canal and external ear normal.      Left Ear: Tympanic membrane, ear canal and external ear normal.      Nose: Nose normal.      Mouth/Throat:      Lips: Pink. Mouth: Mucous membranes are moist.      Pharynx: Oropharynx is clear. Uvula midline. No oropharyngeal exudate or posterior oropharyngeal erythema. Tonsils: No tonsillar exudate or tonsillar abscesses. Eyes:      General: Lids are normal. No scleral icterus. Right eye: No discharge. Left eye: No discharge. Extraocular Movements: Extraocular movements intact. Conjunctiva/sclera: Conjunctivae normal.      Pupils: Pupils are equal, round, and reactive to light. Neck:      Musculoskeletal: Full passive range of motion without pain, normal range of motion and neck supple. Thyroid: No thyroid mass, thyromegaly or thyroid tenderness. Vascular: No JVD. Trachea: No tracheal deviation. Cardiovascular:      Rate and Rhythm: Normal rate and regular rhythm. Heart sounds: Normal heart sounds, S1 normal and S2 normal. No murmur. No friction rub. No gallop. Pulmonary:      Effort: Pulmonary effort is normal. No respiratory distress. Breath sounds: Normal breath sounds and air entry. No stridor, decreased air movement or transmitted upper airway sounds. No decreased breath sounds, wheezing, rhonchi or rales. Chest:      Chest wall: No tenderness. Abdominal:      General: Abdomen is flat. Bowel sounds are normal. There is no distension. Palpations: Abdomen is soft. There is no mass. Tenderness: There is no abdominal tenderness. There is no right CVA tenderness, left CVA tenderness, guarding or rebound. Musculoskeletal: Normal range of motion. General: No tenderness. Lymphadenopathy:      Head:      Right side of head: No submental, submandibular, tonsillar, preauricular, posterior auricular or occipital adenopathy. Left side of head: No submental, submandibular, tonsillar, preauricular or posterior auricular adenopathy. Cervical: No cervical adenopathy.       Right cervical: No superficial, deep or posterior cervical adenopathy. Left cervical: No superficial, deep or posterior cervical adenopathy. Upper Body:      Right upper body: No supraclavicular adenopathy. Left upper body: No supraclavicular adenopathy. Skin:     General: Skin is warm and dry. Coloration: Skin is not pale. Findings: No erythema or rash. Neurological:      General: No focal deficit present. Mental Status: He is alert and oriented to person, place, and time. Cranial Nerves: Cranial nerves are intact. No cranial nerve deficit. Sensory: Sensation is intact. Motor: Motor function is intact. Coordination: Coordination is intact. Coordination normal.      Gait: Gait is intact. Deep Tendon Reflexes: Reflexes are normal and symmetric. Psychiatric:         Attention and Perception: Attention and perception normal.         Mood and Affect: Mood and affect normal.         Speech: Speech normal.         Behavior: Behavior normal. Behavior is cooperative. Thought Content:  Thought content normal.         Cognition and Memory: Cognition and memory normal.         Judgment: Judgment normal.           Vitals:    09/22/20 1025   BP: 131/88   Site: Left Upper Arm   Position: Sitting   Cuff Size: Large Adult   Pulse: 94   Resp: 16   Temp: 97.3 °F (36.3 °C)   TempSrc: Temporal   Weight: 277 lb (125.6 kg)   Height: 5' 10\" (1.778 m)     BP Readings from Last 3 Encounters:   09/22/20 131/88   02/03/20 126/81   01/02/20 (!) 141/94              DIAGNOSTIC FINDINGS:  CBC:  Lab Results   Component Value Date    WBC 8.0 09/03/2019    HGB 13.2 09/03/2019     09/03/2019       BMP:    Lab Results   Component Value Date     09/03/2019    K 4.5 09/03/2019     09/03/2019    CO2 19 09/03/2019    BUN 10 09/03/2019    CREATININE 0.85 09/03/2019    GLUCOSE 121 09/03/2019         FASTING LIPID PANEL:  Lab Results   Component Value Date    CHOL 223 (H) 07/12/2018    HDL 37 (L) 05/29/2019    TRIG 360 (H) 07/12/2018       Results for orders placed or performed in visit on 09/22/20   POCT glycosylated hemoglobin (Hb A1C)   Result Value Ref Range    Hemoglobin A1C 9.3 %         ASSESSMENT AND PLAN:   Diagnosis Orders   1. Type 2 diabetes mellitus with complication, without long-term current use of insulin ([facility])   DIABETES FOOT EXAM    Microalbumin, Ur    Creatinine, Random Urine    POCT glycosylated hemoglobin (Hb A1C)    Urinalysis Reflex to Culture    Dulaglutide (TRULICITY) 2.41 UM/7.2PV SOPN    [facility]oni Flores [facility], [facility]   2. Screening for hyperlipidemia  Lipid, Fasting   3. Need for prophylactic vaccination against diphtheria-tetanus-pertussis (DTP)  Tdap (age 6y and older) IM (Boostrix)   4. Need for prophylactic vaccination and inoculation against varicella  zoster recombinant adjuvanted vaccine (SHINGRIX) 50 MCG/0.5ML SUSR injection   5. Essential hypertension  CBC Auto Differential    Comprehensive Metabolic Panel    Joni Flores [facility], [facility]   6. Hypertriglyceridemia  Lipid, Fasting    Joni Flores [facility], [facility]   7. Gastroesophageal reflux disease, esophagitis presence not specified   continue Pepcid as previously ordered   8. Prostate cancer screening  PSA Screening   9. Encounter for vitamin deficiency screening  Vitamin D 25 Hydroxy   10. Thyroid disorder screen  TSH With Reflex Ft4   11. Chest pain, unspecified type  EKG 12 lead -sinus rhythm without any ST segment elevation or T wave inversion. Patient is asymptomatic currently. Rafael Flores DO, Cardiology, [facility]     Patient is awake alert oriented in no apparent distress. Vital signs are within normal limits. EKG shows no acute changes. Patient had an echocardiogram and stress test in 2018 which I reviewed. Patient is agreeable to following up with cardiology for further evaluation.   He is to go directly to the emergency room with any worsening symptoms. FOLLOW UP AND INSTRUCTIONS:  No follow-ups on file. · Discussed use, benefit, and side effects of prescribed medications. Barriers to medication compliance addressed. All patient questions answered. Pt voiced understanding. · Patient instructed to return to the office if symptoms do not resolve or go directly to the ER if the symptoms worsen - patient voiced understanding. · Patient given educational materials - see patient instructions    Dimas [address]  [facility]  9/22/2020, 11:02 AM    This note is created with the assistance of a speech-recognition program. While intending to generate a document that actually reflects the content of the visit, the document can still have some mistakes which may not have been identified and corrected by editing.

## 2020-09-22 NOTE — TELEPHONE ENCOUNTER
diabetes mellitus with complication, without long-term current use of insulin (HCC)     Chest pain     Essential hypertension     Hypertriglyceridemia     Gastroesophageal reflux disease without esophagitis

## 2020-09-22 NOTE — TELEPHONE ENCOUNTER
Next Visit Date:  Future Appointments   Date Time Provider John Heidi   12/28/2020 10:30 AM Oriana Neeraj Ellen Randle [address] Maintenance   Topic Date Due    Diabetic foot exam  01/26/1978    Diabetic retinal exam  01/26/1978    HIV screen  01/26/1983    DTaP/Tdap/Td vaccine (1 - Tdap) 01/26/1987    Shingles Vaccine (1 of 2) 01/26/2018    Diabetic microalbuminuria test  07/11/2019    Lipid screen  05/29/2020    Flu vaccine (1) 09/01/2020    Potassium monitoring  09/03/2020    Creatinine monitoring  09/03/2020    A1C test (Diabetic or Prediabetic)  12/22/2020    Colon cancer screen colonoscopy  06/25/2029    Hepatitis B vaccine  Completed    Pneumococcal 0-64 years Vaccine  Completed    Hepatitis A vaccine  Aged Out    Hib vaccine  Aged Out    Meningococcal (ACWY) vaccine  Aged Out       Hemoglobin A1C (%)   Date Value   09/22/2020 9.3   01/02/2020 8.6   05/24/2019 6.3             ( goal A1C is < 7)   Microalb/Crt.  Ratio (mcg/mg creat)   Date Value   07/11/2018 CANNOT BE CALCULATED     LDL Cholesterol (mg/dL)   Date Value   05/29/2019 95   07/12/2018 119       (goal LDL is <100)   AST (U/L)   Date Value   09/03/2019 16     ALT (U/L)   Date Value   09/03/2019 18     BUN (mg/dL)   Date Value   09/03/2019 10     BP Readings from Last 3 Encounters:   09/22/20 131/88   02/03/20 126/81   01/02/20 (!) 141/94          (goal 120/80)    All Future Testing planned in [facility]  Lab Frequency Next Occurrence   Microalbumin, Ur Once 10/22/2020   Creatinine, Random Urine Once 10/22/2020   Lipid, Fasting Once 10/22/2020   CBC Auto Differential Once 09/29/2020   Comprehensive Metabolic Panel Once 54/45/9015   PSA Screening Once 09/22/2020   TSH With Reflex Ft4 Once 09/22/2020   Vitamin D 25 Hydroxy Once 09/22/2020   Urinalysis Reflex to Culture Once 09/22/2020               Patient Active Problem List:     Type 2 diabetes mellitus with complication, without long-term current use of insulin [facility])     Chest pain     Essential hypertension     Hypertriglyceridemia     Gastroesophageal reflux disease without esophagitis

## 2020-09-23 RX ORDER — METFORMIN HYDROCHLORIDE 500 MG/1
TABLET, EXTENDED RELEASE ORAL
Qty: 60 TABLET | Refills: 0 | Status: SHIPPED | OUTPATIENT
Start: 2020-09-23 | End: 2020-10-19

## 2020-09-23 RX ORDER — ATORVASTATIN CALCIUM 10 MG/1
TABLET, FILM COATED ORAL
Qty: 30 TABLET | Refills: 0 | Status: SHIPPED | OUTPATIENT
Start: 2020-09-23 | End: 2020-10-19

## 2020-09-23 RX ORDER — FENOFIBRATE 160 MG/1
TABLET ORAL
Qty: 90 TABLET | Refills: 0 | Status: SHIPPED | OUTPATIENT
Start: 2020-09-23 | End: 2021-03-08 | Stop reason: SDUPTHER

## 2020-09-23 RX ORDER — PANTOPRAZOLE SODIUM 20 MG/1
TABLET, DELAYED RELEASE ORAL
Qty: 30 TABLET | Refills: 0 | Status: SHIPPED | OUTPATIENT
Start: 2020-09-23 | End: 2020-10-19

## 2020-09-23 RX ORDER — GLIPIZIDE 5 MG/1
TABLET ORAL
Qty: 60 TABLET | Refills: 0 | Status: SHIPPED | OUTPATIENT
Start: 2020-09-23 | End: 2020-10-19

## 2020-09-23 RX ORDER — LISINOPRIL 10 MG/1
TABLET ORAL
Qty: 30 TABLET | Refills: 0 | Status: SHIPPED | OUTPATIENT
Start: 2020-09-23 | End: 2020-10-19

## 2020-10-19 RX ORDER — METFORMIN HYDROCHLORIDE 500 MG/1
TABLET, EXTENDED RELEASE ORAL
Qty: 60 TABLET | Refills: 0 | Status: SHIPPED | OUTPATIENT
Start: 2020-10-19 | End: 2020-10-20

## 2020-10-19 RX ORDER — PANTOPRAZOLE SODIUM 20 MG/1
TABLET, DELAYED RELEASE ORAL
Qty: 30 TABLET | Refills: 0 | Status: SHIPPED | OUTPATIENT
Start: 2020-10-19 | End: 2020-12-01

## 2020-10-19 RX ORDER — LISINOPRIL 10 MG/1
TABLET ORAL
Qty: 30 TABLET | Refills: 0 | Status: SHIPPED | OUTPATIENT
Start: 2020-10-19 | End: 2020-12-01

## 2020-10-19 RX ORDER — ATORVASTATIN CALCIUM 10 MG/1
TABLET, FILM COATED ORAL
Qty: 30 TABLET | Refills: 0 | Status: SHIPPED | OUTPATIENT
Start: 2020-10-19 | End: 2020-10-21

## 2020-10-19 RX ORDER — GLIPIZIDE 5 MG/1
TABLET ORAL
Qty: 60 TABLET | Refills: 0 | Status: SHIPPED | OUTPATIENT
Start: 2020-10-19 | End: 2020-12-01

## 2020-10-19 RX ORDER — ATORVASTATIN CALCIUM 10 MG/1
TABLET, FILM COATED ORAL
Qty: 30 TABLET | Refills: 0 | Status: CANCELLED | OUTPATIENT
Start: 2020-10-19

## 2020-10-19 NOTE — TELEPHONE ENCOUNTER
Electronic medication refill request. Pharmacy on file. Please advise. Next Visit Date:  Future Appointments   Date Time Provider John Gordon   12/28/2020 10:30 AM Oriana Valdovinos [facility] [address] Maintenance   Topic Date Due    Diabetic retinal exam  01/26/1978    HIV screen  01/26/1983    DTaP/Tdap/Td vaccine (1 - Tdap) 01/26/1987    Shingles Vaccine (1 of 2) 01/26/2018    Diabetic microalbuminuria test  07/11/2019    Lipid screen  05/29/2020    Flu vaccine (1) 09/01/2020    Potassium monitoring  09/03/2020    Creatinine monitoring  09/03/2020    A1C test (Diabetic or Prediabetic)  12/22/2020    Diabetic foot exam  09/22/2021    Colon cancer screen colonoscopy  06/25/2029    Hepatitis B vaccine  Completed    Pneumococcal 0-64 years Vaccine  Completed    Hepatitis A vaccine  Aged Out    Hib vaccine  Aged Out    Meningococcal (ACWY) vaccine  Aged Out       Hemoglobin A1C (%)   Date Value   09/22/2020 9.3   01/02/2020 8.6   05/24/2019 6.3             ( goal A1C is < 7)   Microalb/Crt.  Ratio (mcg/mg creat)   Date Value   07/11/2018 CANNOT BE CALCULATED     LDL Cholesterol (mg/dL)   Date Value   05/29/2019 95   07/12/2018 119       (goal LDL is <100)   AST (U/L)   Date Value   09/03/2019 16     ALT (U/L)   Date Value   09/03/2019 18     BUN (mg/dL)   Date Value   09/03/2019 10     BP Readings from Last 3 Encounters:   09/22/20 131/88   02/03/20 126/81   01/02/20 (!) 141/94          (goal 120/80)    All Future Testing planned in [facility]  Lab Frequency Next Occurrence   Microalbumin, Ur Once 10/22/2020   Creatinine, Random Urine Once 10/22/2020   Lipid, Fasting Once 10/22/2020   CBC Auto Differential Once 09/29/2020   Comprehensive Metabolic Panel Once 74/91/9540   PSA Screening Once 09/22/2020   TSH With Reflex Ft4 Once 09/22/2020   Vitamin D 25 Hydroxy Once 09/22/2020   Urinalysis Reflex to Culture Once 09/22/2020               Patient Active Problem List:     Type 2 diabetes mellitus with complication, without long-term current use of insulin (HCC)     Chest pain     Essential hypertension     Hypertriglyceridemia     Gastroesophageal reflux disease without esophagitis

## 2020-10-19 NOTE — TELEPHONE ENCOUNTER
Electronic medication refill request. Pharmacy on file. Please advise. Next Visit Date:  Future Appointments   Date Time Provider John Gordon   12/28/2020 10:30 AM Oriana Rosario [facility] AND [facility] [address] Maintenance   Topic Date Due    Diabetic retinal exam  01/26/1978    HIV screen  01/26/1983    DTaP/Tdap/Td vaccine (1 - Tdap) 01/26/1987    Shingles Vaccine (1 of 2) 01/26/2018    Diabetic microalbuminuria test  07/11/2019    Lipid screen  05/29/2020    Flu vaccine (1) 09/01/2020    Potassium monitoring  09/03/2020    Creatinine monitoring  09/03/2020    A1C test (Diabetic or Prediabetic)  12/22/2020    Diabetic foot exam  09/22/2021    Colon cancer screen colonoscopy  06/25/2029    Hepatitis B vaccine  Completed    Pneumococcal 0-64 years Vaccine  Completed    Hepatitis A vaccine  Aged Out    Hib vaccine  Aged Out    Meningococcal (ACWY) vaccine  Aged Out       Hemoglobin A1C (%)   Date Value   09/22/2020 9.3   01/02/2020 8.6   05/24/2019 6.3             ( goal A1C is < 7)   Microalb/Crt.  Ratio (mcg/mg creat)   Date Value   07/11/2018 CANNOT BE CALCULATED     LDL Cholesterol (mg/dL)   Date Value   05/29/2019 95   07/12/2018 119       (goal LDL is <100)   AST (U/L)   Date Value   09/03/2019 16     ALT (U/L)   Date Value   09/03/2019 18     BUN (mg/dL)   Date Value   09/03/2019 10     BP Readings from Last 3 Encounters:   09/22/20 131/88   02/03/20 126/81   01/02/20 (!) 141/94          (goal 120/80)    All Future Testing planned in [facility]  Lab Frequency Next Occurrence   Microalbumin, Ur Once 10/22/2020   Creatinine, Random Urine Once 10/22/2020   Lipid, Fasting Once 10/22/2020   CBC Auto Differential Once 09/29/2020   Comprehensive Metabolic Panel Once 64/92/4580   PSA Screening Once 09/22/2020   TSH With Reflex Ft4 Once 09/22/2020   Vitamin D 25 Hydroxy Once 09/22/2020   Urinalysis Reflex to Culture Once 09/22/2020               Patient Active Problem List:     Type 2

## 2020-10-20 RX ORDER — METFORMIN HYDROCHLORIDE 500 MG/1
TABLET, EXTENDED RELEASE ORAL
Qty: 60 TABLET | Refills: 0 | Status: SHIPPED | OUTPATIENT
Start: 2020-10-20 | End: 2020-12-31

## 2020-10-20 NOTE — TELEPHONE ENCOUNTER
Next Visit Date:  Future Appointments   Date Time Provider John Gordon   12/28/2020 10:30 AM Oriana Murillo [facility] AND [facility] [address] Maintenance   Topic Date Due    Diabetic retinal exam  01/26/1978    HIV screen  01/26/1983    DTaP/Tdap/Td vaccine (1 - Tdap) 01/26/1987    Shingles Vaccine (1 of 2) 01/26/2018    Diabetic microalbuminuria test  07/11/2019    Lipid screen  05/29/2020    Flu vaccine (1) 09/01/2020    Potassium monitoring  09/03/2020    Creatinine monitoring  09/03/2020    A1C test (Diabetic or Prediabetic)  12/22/2020    Diabetic foot exam  09/22/2021    Colon cancer screen colonoscopy  06/25/2029    Hepatitis B vaccine  Completed    Pneumococcal 0-64 years Vaccine  Completed    Hepatitis A vaccine  Aged Out    Hib vaccine  Aged Out    Meningococcal (ACWY) vaccine  Aged Out       Hemoglobin A1C (%)   Date Value   09/22/2020 9.3   01/02/2020 8.6   05/24/2019 6.3             ( goal A1C is < 7)   Microalb/Crt.  Ratio (mcg/mg creat)   Date Value   07/11/2018 CANNOT BE CALCULATED     LDL Cholesterol (mg/dL)   Date Value   05/29/2019 95   07/12/2018 119       (goal LDL is <100)   AST (U/L)   Date Value   09/03/2019 16     ALT (U/L)   Date Value   09/03/2019 18     BUN (mg/dL)   Date Value   09/03/2019 10     BP Readings from Last 3 Encounters:   09/22/20 131/88   02/03/20 126/81   01/02/20 (!) 141/94          (goal 120/80)    All Future Testing planned in [facility]  Lab Frequency Next Occurrence   Microalbumin, Ur Once 10/22/2020   Creatinine, Random Urine Once 10/22/2020   Lipid, Fasting Once 10/22/2020   CBC Auto Differential Once 09/29/2020   Comprehensive Metabolic Panel Once 84/08/2137   PSA Screening Once 09/22/2020   TSH With Reflex Ft4 Once 09/22/2020   Vitamin D 25 Hydroxy Once 09/22/2020   Urinalysis Reflex to Culture Once 09/22/2020               Patient Active Problem List:     Type 2 diabetes mellitus with complication, without long-term current use of insulin [facility])     Chest pain     Essential hypertension     Hypertriglyceridemia     Gastroesophageal reflux disease without esophagitis

## 2020-10-21 RX ORDER — ATORVASTATIN CALCIUM 10 MG/1
TABLET, FILM COATED ORAL
Qty: 30 TABLET | Refills: 0 | Status: SHIPPED | OUTPATIENT
Start: 2020-10-21 | End: 2020-12-01

## 2020-10-21 NOTE — TELEPHONE ENCOUNTER
Last visit: 9/22/2020  Last Med refill:   Next Visit Date:  Future Appointments   Date Time Provider John Gordon   12/28/2020 10:30 AM [facility] [address] Maintenance   Topic Date Due    Diabetic retinal exam  01/26/1978    HIV screen  01/26/1983    DTaP/Tdap/Td vaccine (1 - Tdap) 01/26/1987    Shingles Vaccine (1 of 2) 01/26/2018    Diabetic microalbuminuria test  07/11/2019    Lipid screen  05/29/2020    Flu vaccine (1) 09/01/2020    Potassium monitoring  09/03/2020    Creatinine monitoring  09/03/2020    A1C test (Diabetic or Prediabetic)  12/22/2020    Diabetic foot exam  09/22/2021    Colon cancer screen colonoscopy  06/25/2029    Hepatitis B vaccine  Completed    Pneumococcal 0-64 years Vaccine  Completed    Hepatitis A vaccine  Aged Out    Hib vaccine  Aged Out    Meningococcal (ACWY) vaccine  Aged Out       Hemoglobin A1C (%)   Date Value   09/22/2020 9.3   01/02/2020 8.6   05/24/2019 6.3             ( goal A1C is < 7)   Microalb/Crt.  Ratio (mcg/mg creat)   Date Value   07/11/2018 CANNOT BE CALCULATED     LDL Cholesterol (mg/dL)   Date Value   05/29/2019 95   07/12/2018 119       (goal LDL is <100)   AST (U/L)   Date Value   09/03/2019 16     ALT (U/L)   Date Value   09/03/2019 18     BUN (mg/dL)   Date Value   09/03/2019 10     BP Readings from Last 3 Encounters:   09/22/20 131/88   02/03/20 126/81   01/02/20 (!) 141/94          (goal 120/80)    All Future Testing planned in [facility]  Lab Frequency Next Occurrence   Microalbumin, Ur Once 10/22/2020   Creatinine, Random Urine Once 10/22/2020   Lipid, Fasting Once 10/22/2020   CBC Auto Differential Once 09/29/2020   Comprehensive Metabolic Panel Once 61/64/3893   PSA Screening Once 09/22/2020   TSH With Reflex Ft4 Once 09/22/2020   Vitamin D 25 Hydroxy Once 09/22/2020   Urinalysis Reflex to Culture Once 09/22/2020               Patient Active Problem List:     Type 2 diabetes mellitus with complication, without long-term current use of insulin (HCC)     Chest pain     Essential hypertension     Hypertriglyceridemia     Gastroesophageal reflux disease without esophagitis

## 2020-11-30 NOTE — TELEPHONE ENCOUNTER
Electronic medication refill request. Pharmacy on file. Please advise. Next Visit Date:  Future Appointments   Date Time Provider John Gordon   12/28/2020 10:30 AM Oriana Uriarte [facility] AND [facility] [address] Maintenance   Topic Date Due    Diabetic retinal exam  01/26/1978    HIV screen  01/26/1983    DTaP/Tdap/Td vaccine (1 - Tdap) 01/26/1987    Shingles Vaccine (1 of 2) 01/26/2018    Diabetic microalbuminuria test  07/11/2019    Lipid screen  05/29/2020    Flu vaccine (1) 09/01/2020    Potassium monitoring  09/03/2020    Creatinine monitoring  09/03/2020    A1C test (Diabetic or Prediabetic)  12/22/2020    Diabetic foot exam  09/22/2021    Colon cancer screen colonoscopy  06/25/2029    Hepatitis B vaccine  Completed    Pneumococcal 0-64 years Vaccine  Completed    Hepatitis A vaccine  Aged Out    Hib vaccine  Aged Out    Meningococcal (ACWY) vaccine  Aged Out       Hemoglobin A1C (%)   Date Value   09/22/2020 9.3   01/02/2020 8.6   05/24/2019 6.3             ( goal A1C is < 7)   Microalb/Crt.  Ratio (mcg/mg creat)   Date Value   07/11/2018 CANNOT BE CALCULATED     LDL Cholesterol (mg/dL)   Date Value   05/29/2019 95   07/12/2018 119       (goal LDL is <100)   AST (U/L)   Date Value   09/03/2019 16     ALT (U/L)   Date Value   09/03/2019 18     BUN (mg/dL)   Date Value   09/03/2019 10     BP Readings from Last 3 Encounters:   09/22/20 131/88   02/03/20 126/81   01/02/20 (!) 141/94          (goal 120/80)    All Future Testing planned in [facility]  Lab Frequency Next Occurrence   Microalbumin, Ur Once 10/22/2020   Creatinine, Random Urine Once 10/22/2020   Lipid, Fasting Once 10/22/2020   CBC Auto Differential Once 09/29/2020   Comprehensive Metabolic Panel Once 74/02/7384   PSA Screening Once 09/22/2020   TSH With Reflex Ft4 Once 09/22/2020   Vitamin D 25 Hydroxy Once 09/22/2020   Urinalysis Reflex to Culture Once 09/22/2020               Patient Active Problem List:     Type 2 diabetes mellitus with complication, without long-term current use of insulin (HCC)     Chest pain     Essential hypertension     Hypertriglyceridemia     Gastroesophageal reflux disease without esophagitis

## 2020-12-01 RX ORDER — GLIPIZIDE 5 MG/1
TABLET ORAL
Qty: 60 TABLET | Refills: 0 | Status: SHIPPED | OUTPATIENT
Start: 2020-12-01 | End: 2020-12-30

## 2020-12-01 RX ORDER — ATORVASTATIN CALCIUM 10 MG/1
TABLET, FILM COATED ORAL
Qty: 30 TABLET | Refills: 0 | Status: SHIPPED | OUTPATIENT
Start: 2020-12-01 | End: 2021-02-03 | Stop reason: SDUPTHER

## 2020-12-01 RX ORDER — LISINOPRIL 10 MG/1
TABLET ORAL
Qty: 30 TABLET | Refills: 0 | Status: SHIPPED | OUTPATIENT
Start: 2020-12-01 | End: 2020-12-30

## 2020-12-01 RX ORDER — PANTOPRAZOLE SODIUM 20 MG/1
TABLET, DELAYED RELEASE ORAL
Qty: 30 TABLET | Refills: 0 | Status: SHIPPED | OUTPATIENT
Start: 2020-12-01 | End: 2020-12-30

## 2020-12-28 ENCOUNTER — HOSPITAL ENCOUNTER (OUTPATIENT)
Age: 50
Setting detail: SPECIMEN
Discharge: HOME | End: 2020-12-28
Payer: COMMERCIAL

## 2020-12-28 ENCOUNTER — OFFICE VISIT (OUTPATIENT)
Dept: FAMILY MEDICINE CLINIC | Age: 50
End: 2020-12-28
Payer: COMMERCIAL

## 2020-12-28 VITALS
WEIGHT: 278 LBS | SYSTOLIC BLOOD PRESSURE: 120 MMHG | DIASTOLIC BLOOD PRESSURE: 84 MMHG | TEMPERATURE: 97.2 F | OXYGEN SATURATION: 96 % | HEIGHT: 70 IN | BODY MASS INDEX: 39.8 KG/M2 | HEART RATE: 97 BPM

## 2020-12-28 LAB
ABSOLUTE EOS #: 0.14 K/UL (ref 0–0.44)
ABSOLUTE IMMATURE GRANULOCYTE: 0.04 K/UL (ref 0–0.3)
ABSOLUTE LYMPH #: 2.07 K/UL (ref 1.1–3.7)
ABSOLUTE MONO #: 0.63 K/UL (ref 0.1–1.2)
ALBUMIN SERPL-MCNC: 3.7 G/DL (ref 3.5–5.2)
ALBUMIN/GLOBULIN RATIO: 1.1 (ref 1–2.5)
ALP BLD-CCNC: 65 U/L (ref 40–129)
ALT SERPL-CCNC: 37 U/L (ref 5–41)
ANION GAP SERPL CALCULATED.3IONS-SCNC: 10 MMOL/L (ref 9–17)
AST SERPL-CCNC: 27 U/L
BASOPHILS # BLD: 1 % (ref 0–2)
BASOPHILS ABSOLUTE: 0.05 K/UL (ref 0–0.2)
BILIRUB SERPL-MCNC: 0.18 MG/DL (ref 0.3–1.2)
BILIRUBIN URINE: NEGATIVE
BUN BLDV-MCNC: 13 MG/DL (ref 6–20)
BUN/CREAT BLD: ABNORMAL (ref 9–20)
CALCIUM SERPL-MCNC: 9.2 MG/DL (ref 8.6–10.4)
CHLORIDE BLD-SCNC: 104 MMOL/L (ref 98–107)
CHOLESTEROL, FASTING: 152 MG/DL
CHOLESTEROL/HDL RATIO: 4
CO2: 22 MMOL/L (ref 20–31)
COLOR: YELLOW
COMMENT UA: ABNORMAL
CREAT SERPL-MCNC: 0.9 MG/DL (ref 0.7–1.2)
CREATININE URINE: 156 MG/DL (ref 39–259)
CREATININE URINE: 157.4 MG/DL (ref 39–259)
DIFFERENTIAL TYPE: ABNORMAL
EOSINOPHILS RELATIVE PERCENT: 2 % (ref 1–4)
ESTIMATED AVERAGE GLUCOSE: 249 MG/DL
GFR AFRICAN AMERICAN: >60 ML/MIN
GFR NON-AFRICAN AMERICAN: >60 ML/MIN
GFR SERPL CREATININE-BSD FRML MDRD: ABNORMAL ML/MIN/{1.73_M2}
GFR SERPL CREATININE-BSD FRML MDRD: ABNORMAL ML/MIN/{1.73_M2}
GLUCOSE BLD-MCNC: 234 MG/DL (ref 70–99)
GLUCOSE URINE: ABNORMAL
HBA1C MFR BLD: 10.3 % (ref 4–6)
HCT VFR BLD CALC: 42.7 % (ref 40.7–50.3)
HDLC SERPL-MCNC: 38 MG/DL
HEMOGLOBIN: 13.8 G/DL (ref 13–17)
IMMATURE GRANULOCYTES: 1 %
KETONES, URINE: NEGATIVE
LDL CHOLESTEROL: 81 MG/DL (ref 0–130)
LEUKOCYTE ESTERASE, URINE: NEGATIVE
LYMPHOCYTES # BLD: 24 % (ref 24–43)
MCH RBC QN AUTO: 32.3 PG (ref 25.2–33.5)
MCHC RBC AUTO-ENTMCNC: 32.3 G/DL (ref 28.4–34.8)
MCV RBC AUTO: 100 FL (ref 82.6–102.9)
MICROALBUMIN/CREAT 24H UR: <12 MG/L
MICROALBUMIN/CREAT UR-RTO: NORMAL MCG/MG CREAT
MONOCYTES # BLD: 7 % (ref 3–12)
NITRITE, URINE: NEGATIVE
NRBC AUTOMATED: 0 PER 100 WBC
PDW BLD-RTO: 12.8 % (ref 11.8–14.4)
PH UA: 5 (ref 5–8)
PLATELET # BLD: 221 K/UL (ref 138–453)
PLATELET ESTIMATE: ABNORMAL
PMV BLD AUTO: 12.6 FL (ref 8.1–13.5)
POTASSIUM SERPL-SCNC: 4.4 MMOL/L (ref 3.7–5.3)
PROSTATE SPECIFIC ANTIGEN: 0.22 UG/L
PROTEIN UA: NEGATIVE
RBC # BLD: 4.27 M/UL (ref 4.21–5.77)
RBC # BLD: ABNORMAL 10*6/UL
SEG NEUTROPHILS: 65 % (ref 36–65)
SEGMENTED NEUTROPHILS ABSOLUTE COUNT: 5.54 K/UL (ref 1.5–8.1)
SODIUM BLD-SCNC: 136 MMOL/L (ref 135–144)
SPECIFIC GRAVITY UA: 1.03 (ref 1–1.03)
TOTAL PROTEIN: 7 G/DL (ref 6.4–8.3)
TRIGLYCERIDE, FASTING: 167 MG/DL
TSH SERPL DL<=0.05 MIU/L-ACNC: 1.13 MIU/L (ref 0.3–5)
TURBIDITY: CLEAR
URINE HGB: NEGATIVE
UROBILINOGEN, URINE: NORMAL
VITAMIN D 25-HYDROXY: 11.1 NG/ML (ref 30–100)
VLDLC SERPL CALC-MCNC: ABNORMAL MG/DL (ref 1–30)
WBC # BLD: 8.5 K/UL (ref 3.5–11.3)
WBC # BLD: ABNORMAL 10*3/UL

## 2020-12-28 PROCEDURE — 99214 OFFICE O/P EST MOD 30 MIN: CPT | Performed by: PHYSICIAN ASSISTANT

## 2020-12-28 ASSESSMENT — ENCOUNTER SYMPTOMS
CONSTIPATION: 0
NAUSEA: 0
CHEST TIGHTNESS: 0
BLOOD IN STOOL: 0
ANAL BLEEDING: 0
RECTAL PAIN: 0
VOMITING: 0
ABDOMINAL PAIN: 0
BACK PAIN: 0
DIARRHEA: 0
COUGH: 0
ABDOMINAL DISTENTION: 0
SHORTNESS OF BREATH: 0
WHEEZING: 0

## 2020-12-28 NOTE — PROGRESS NOTES
[address] PRIMARY CARE  [address] [place] [address] [facility]  Dept: [phone]  Dept Fax: [phone]    Office Progress Note  Date of patient's visit: 12/29/2020  Patient's Name:  Maciej Oliver YOB: 1970            LUKE SHEPARD  ================================================================    REASON FOR VISIT/CHIEF COMPLAINT:  Other (follow up)    HISTORY OF PRESENTING ILLNESS:  History was obtained from: patient. Maciej Oliver is a 44 y.o. is here for follow up for diabetes, hypertriglyceridemia, hypertension. Patient states that he has been taking his medications as prescribed except he never started the Trulicity that was ordered at last appointment. Patient states that he has been eating poorly and not exercising. He complains of excessive dry mouth in the morning. He denies any chest pain, shortness of breath, headaches, dizziness, heart palpitations or urinary symptoms. Patient has not gotten blood work completed as ordered at his last appointment.       Patient Active Problem List   Diagnosis    Type 2 diabetes mellitus with complication, without long-term current use of insulin ([address]    Chest pain    Essential hypertension    Hypertriglyceridemia    Gastroesophageal reflux disease without esophagitis       Health Maintenance Due   Topic Date Due    Diabetic retinal exam  01/26/1978    DTaP/Tdap/Td vaccine (1 - Tdap) 01/26/1987    Shingles Vaccine (1 of 2) 01/26/2018    Flu vaccine (1) 09/01/2020       Allergies   Allergen Reactions    Toradol [Ketorolac Tromethamine] Shortness Of Breath         Current Outpatient Medications   Medication Sig Dispense Refill    atorvastatin (LIPITOR) 10 MG tablet Take 1 tablet by mouth once daily 30 tablet 0    pantoprazole (PROTONIX) 20 MG tablet Take 1 tablet by mouth once daily 30 tablet 0    lisinopril (PRINIVIL;ZESTRIL) 10 MG tablet Take 1 tablet by mouth once daily 30 tablet 0  glipiZIDE (GLUCOTROL) 5 MG tablet Take 1 tablet by mouth twice daily 60 tablet 0    metFORMIN (GLUCOPHAGE-XR) 500 MG extended release tablet TAKE ONE TABLET BY MOUTH ONCE DAILY BEFORE A MEAL FOR 14 DAYS, THEN TAKE ONE TABLET TWICE DAILY BEFORE MEALS 60 tablet 0    fenofibrate (TRIGLIDE) 160 MG tablet Take 1 tablet by mouth once daily 90 tablet 0    albuterol sulfate HFA (PROVENTIL HFA) 108 (90 Base) MCG/ACT inhaler Inhale 1-2 puffs into the lungs every 4 hours as needed for Wheezing or Shortness of Breath (Space out to every 6 hours as symptoms improve) Space out to every 6 hours as symptoms improve. 1 Inhaler 0    famotidine (PEPCID) 20 MG tablet TAKE 1 TABLET BY MOUTH TWICE DAILY 60 tablet 3    Dulaglutide (TRULICITY) 0.05 RX/6.3HX SOPN Inject 0.75 mg into the skin once a week (Patient not taking: Reported on 12/28/2020) 4 pen 1    budesonide-formoterol (SYMBICORT) 160-4.5 MCG/ACT AERO Inhale 2 puffs into the lungs 2 times daily (Patient not taking: Reported on 9/22/2020) 1 Inhaler 0     No current facility-administered medications for this visit. Social History     Tobacco Use    Smoking status: Current Every Day Smoker     Packs/day: 1.00     Years: 0.00     Pack years: 0.00     Types: Cigarettes    Smokeless tobacco: Former User   Substance Use Topics    Alcohol use: Yes     Comment: few beers ocasionally    Drug use: No       History reviewed. No pertinent family history. Review of Systems   Constitutional: Negative for appetite change, chills, diaphoresis, fatigue, fever and unexpected weight change. Respiratory: Negative for cough, chest tightness, shortness of breath and wheezing. Cardiovascular: Negative for chest pain, palpitations and leg swelling. Gastrointestinal: Negative for abdominal distention, abdominal pain, anal bleeding, blood in stool, constipation, diarrhea, nausea, rectal pain and vomiting. Vitals:    12/28/20 1033 12/28/20 1057   BP: (!) 160/88 120/84   Site: Right Upper Arm    Position: Sitting    Pulse: 97    Temp: 97.2 °F (36.2 °C)    SpO2: 96%    Weight: 278 lb (126.1 kg)    Height: 5' 10\" (1.778 m)      BP Readings from Last 3 Encounters:   12/28/20 120/84   09/22/20 131/88   02/03/20 126/81              DIAGNOSTIC FINDINGS:  CBC:  Lab Results   Component Value Date    WBC 8.5 12/28/2020    HGB 13.8 12/28/2020     12/28/2020       BMP:    Lab Results   Component Value Date     12/28/2020    K 4.4 12/28/2020     12/28/2020    CO2 22 12/28/2020    BUN 13 12/28/2020    CREATININE 0.90 12/28/2020    GLUCOSE 234 12/28/2020         FASTING LIPID PANEL:  Lab Results   Component Value Date    CHOL 223 (H) 07/12/2018    HDL 38 (L) 12/28/2020    TRIG 360 (H) 07/12/2018       No results found for this visit on 12/28/20. ASSESSMENT AND PLAN:   Diagnosis Orders   1. Type 2 diabetes mellitus with complication, without long-term current use of insulin (HCC)  POCT glycosylated hemoglobin (Hb A1C)   2. Hypertriglyceridemia   continue medications as previously prescribed   3. Essential hypertension   blood pressure well controlled continue medications as prescribed       FOLLOW UP AND INSTRUCTIONS:  No follow-ups on file. · Discussed use, benefit, and side effects of prescribed medications. Barriers to medication compliance addressed. All patient questions answered. Pt voiced understanding. · Patient instructed to return to the office if symptoms do not resolve or go directly to the ER if the symptoms worsen - patient voiced understanding.     · Patient given educational materials - see patient instructions    Dimas [address]  [facility]  12/29/2020, 9:29 AM This note is created with the assistance of a speech-recognition program. While intending to generate a document that actually reflects the content of the visit, the document can still have some mistakes which may not have been identified and corrected by editing.

## 2020-12-30 RX ORDER — LISINOPRIL 10 MG/1
TABLET ORAL
Qty: 30 TABLET | Refills: 0 | Status: SHIPPED | OUTPATIENT
Start: 2020-12-30 | End: 2021-02-03 | Stop reason: SDUPTHER

## 2020-12-30 RX ORDER — PANTOPRAZOLE SODIUM 20 MG/1
TABLET, DELAYED RELEASE ORAL
Qty: 30 TABLET | Refills: 0 | Status: SHIPPED | OUTPATIENT
Start: 2020-12-30 | End: 2021-02-03 | Stop reason: SDUPTHER

## 2020-12-30 RX ORDER — GLIPIZIDE 5 MG/1
TABLET ORAL
Qty: 60 TABLET | Refills: 0 | Status: SHIPPED | OUTPATIENT
Start: 2020-12-30 | End: 2021-02-03 | Stop reason: SDUPTHER

## 2020-12-30 NOTE — TELEPHONE ENCOUNTER
Next Visit Date:  No future appointments. Health Maintenance   Topic Date Due    Diabetic retinal exam  01/26/1978    DTaP/Tdap/Td vaccine (1 - Tdap) 01/26/1987    Shingles Vaccine (1 of 2) 01/26/2018    Flu vaccine (1) 09/01/2020    Hepatitis C screen  12/28/2021 (Originally 1970)    HIV screen  12/28/2021 (Originally 1/26/1983)    A1C test (Diabetic or Prediabetic)  03/28/2021    Diabetic foot exam  09/22/2021    Diabetic microalbuminuria test  12/28/2021    Lipid screen  12/28/2021    Potassium monitoring  12/28/2021    Creatinine monitoring  12/28/2021    Colon cancer screen colonoscopy  06/25/2029    Hepatitis B vaccine  Completed    Pneumococcal 0-64 years Vaccine  Completed    Hepatitis A vaccine  Aged Out    Hib vaccine  Aged Out    Meningococcal (ACWY) vaccine  Aged Out       Hemoglobin A1C (%)   Date Value   12/28/2020 10.3 (H)   09/22/2020 9.3   01/02/2020 8.6             ( goal A1C is < 7)   Microalb/Crt.  Ratio (mcg/mg creat)   Date Value   12/28/2020 CANNOT BE CALCULATED     LDL Cholesterol (mg/dL)   Date Value   12/28/2020 81   05/29/2019 95       (goal LDL is <100)   AST (U/L)   Date Value   12/28/2020 27     ALT (U/L)   Date Value   12/28/2020 37     BUN (mg/dL)   Date Value   12/28/2020 13     BP Readings from Last 3 Encounters:   12/28/20 120/84   09/22/20 131/88   02/03/20 126/81          (goal 120/80)    All Future Testing planned in [facility]  Lab Frequency Next Occurrence               Patient Active Problem List:     Type 2 diabetes mellitus with complication, without long-term current use of insulin (HCC)     Chest pain     Essential hypertension     Hypertriglyceridemia     Gastroesophageal reflux disease without esophagitis

## 2020-12-31 RX ORDER — METFORMIN HYDROCHLORIDE 500 MG/1
TABLET, EXTENDED RELEASE ORAL
Qty: 60 TABLET | Refills: 0 | Status: SHIPPED | OUTPATIENT
Start: 2020-12-31 | End: 2021-02-02

## 2020-12-31 NOTE — TELEPHONE ENCOUNTER
Next Visit Date:  No future appointments. Health Maintenance   Topic Date Due    Diabetic retinal exam  01/26/1978    DTaP/Tdap/Td vaccine (1 - Tdap) 01/26/1987    Flu vaccine (1) 09/01/2020    Hepatitis C screen  12/28/2021 (Originally 1970)    HIV screen  12/28/2021 (Originally 1/26/1983)    Shingles Vaccine (2 of 2) 02/24/2021    A1C test (Diabetic or Prediabetic)  03/28/2021    Diabetic foot exam  09/22/2021    Diabetic microalbuminuria test  12/28/2021    Lipid screen  12/28/2021    Potassium monitoring  12/28/2021    Creatinine monitoring  12/28/2021    Colon cancer screen colonoscopy  06/25/2029    Hepatitis B vaccine  Completed    Pneumococcal 0-64 years Vaccine  Completed    Hepatitis A vaccine  Aged Out    Hib vaccine  Aged Out    Meningococcal (ACWY) vaccine  Aged Out       Hemoglobin A1C (%)   Date Value   12/28/2020 10.3 (H)   09/22/2020 9.3   01/02/2020 8.6             ( goal A1C is < 7)   Microalb/Crt.  Ratio (mcg/mg creat)   Date Value   12/28/2020 CANNOT BE CALCULATED     LDL Cholesterol (mg/dL)   Date Value   12/28/2020 81   05/29/2019 95       (goal LDL is <100)   AST (U/L)   Date Value   12/28/2020 27     ALT (U/L)   Date Value   12/28/2020 37     BUN (mg/dL)   Date Value   12/28/2020 13     BP Readings from Last 3 Encounters:   12/28/20 120/84   09/22/20 131/88   02/03/20 126/81          (goal 120/80)    All Future Testing planned in [facility]  Lab Frequency Next Occurrence               Patient Active Problem List:     Type 2 diabetes mellitus with complication, without long-term current use of insulin (HCC)     Chest pain     Essential hypertension     Hypertriglyceridemia     Gastroesophageal reflux disease without esophagitis

## 2021-02-01 DIAGNOSIS — E11.8 TYPE 2 DIABETES MELLITUS WITH COMPLICATION, WITHOUT LONG-TERM CURRENT USE OF INSULIN (HCC): ICD-10-CM

## 2021-02-02 RX ORDER — METFORMIN HYDROCHLORIDE 500 MG/1
TABLET, EXTENDED RELEASE ORAL
Qty: 60 TABLET | Refills: 0 | Status: SHIPPED | OUTPATIENT
Start: 2021-02-02 | End: 2021-03-08 | Stop reason: SDUPTHER

## 2021-02-02 NOTE — TELEPHONE ENCOUNTER
Electronic medication refill request. Pharmacy on file. Please advise. Next Visit Date:  No future appointments. Health Maintenance   Topic Date Due    Diabetic retinal exam  01/26/1978    DTaP/Tdap/Td vaccine (1 - Tdap) 01/26/1987    Flu vaccine (1) 09/01/2020    Hepatitis C screen  12/28/2021 (Originally 1970)    HIV screen  12/28/2021 (Originally 1/26/1983)    Shingles Vaccine (2 of 2) 02/24/2021    A1C test (Diabetic or Prediabetic)  03/28/2021    Diabetic foot exam  09/22/2021    Diabetic microalbuminuria test  12/28/2021    Lipid screen  12/28/2021    Potassium monitoring  12/28/2021    Creatinine monitoring  12/28/2021    Colon cancer screen colonoscopy  06/25/2029    Hepatitis B vaccine  Completed    Pneumococcal 0-64 years Vaccine  Completed    Hepatitis A vaccine  Aged Out    Hib vaccine  Aged Out    Meningococcal (ACWY) vaccine  Aged Out       Hemoglobin A1C (%)   Date Value   12/28/2020 10.3 (H)   09/22/2020 9.3   01/02/2020 8.6             ( goal A1C is < 7)   Microalb/Crt.  Ratio (mcg/mg creat)   Date Value   12/28/2020 CANNOT BE CALCULATED     LDL Cholesterol (mg/dL)   Date Value   12/28/2020 81   05/29/2019 95       (goal LDL is <100)   AST (U/L)   Date Value   12/28/2020 27     ALT (U/L)   Date Value   12/28/2020 37     BUN (mg/dL)   Date Value   12/28/2020 13     BP Readings from Last 3 Encounters:   12/28/20 120/84   09/22/20 131/88   02/03/20 126/81          (goal 120/80)    All Future Testing planned in [facility]  Lab Frequency Next Occurrence               Patient Active Problem List:     Type 2 diabetes mellitus with complication, without long-term current use of insulin (HCC)     Chest pain     Essential hypertension     Hypertriglyceridemia     Gastroesophageal reflux disease without esophagitis

## 2021-02-03 DIAGNOSIS — K21.9 GASTROESOPHAGEAL REFLUX DISEASE: ICD-10-CM

## 2021-02-03 DIAGNOSIS — I10 ESSENTIAL HYPERTENSION: ICD-10-CM

## 2021-02-03 DIAGNOSIS — E78.1 HYPERTRIGLYCERIDEMIA: ICD-10-CM

## 2021-02-03 DIAGNOSIS — E11.8 TYPE 2 DIABETES MELLITUS WITH COMPLICATION, WITHOUT LONG-TERM CURRENT USE OF INSULIN (HCC): ICD-10-CM

## 2021-02-03 RX ORDER — ATORVASTATIN CALCIUM 10 MG/1
10 TABLET, FILM COATED ORAL DAILY
Qty: 30 TABLET | Refills: 0 | Status: SHIPPED | OUTPATIENT
Start: 2021-02-03 | End: 2021-03-08 | Stop reason: SDUPTHER

## 2021-02-03 RX ORDER — LISINOPRIL 10 MG/1
10 TABLET ORAL DAILY
Qty: 30 TABLET | Refills: 0 | Status: SHIPPED | OUTPATIENT
Start: 2021-02-03 | End: 2021-03-08 | Stop reason: SDUPTHER

## 2021-02-03 RX ORDER — GLIPIZIDE 5 MG/1
TABLET ORAL
Qty: 60 TABLET | Refills: 0 | Status: SHIPPED | OUTPATIENT
Start: 2021-02-03 | End: 2021-03-08 | Stop reason: SDUPTHER

## 2021-02-03 RX ORDER — PANTOPRAZOLE SODIUM 20 MG/1
TABLET, DELAYED RELEASE ORAL
Qty: 30 TABLET | Refills: 0 | Status: SHIPPED | OUTPATIENT
Start: 2021-02-03 | End: 2021-03-08 | Stop reason: SDUPTHER

## 2021-02-25 DIAGNOSIS — E11.8 TYPE 2 DIABETES MELLITUS WITH COMPLICATION, WITHOUT LONG-TERM CURRENT USE OF INSULIN (HCC): ICD-10-CM

## 2021-02-25 RX ORDER — DULAGLUTIDE 0.75 MG/.5ML
0.75 INJECTION, SOLUTION SUBCUTANEOUS WEEKLY
Qty: 4 PEN | Refills: 1 | Status: SHIPPED | OUTPATIENT
Start: 2021-02-25 | End: 2021-04-05 | Stop reason: DRUGHIGH

## 2021-02-25 NOTE — TELEPHONE ENCOUNTER
Electronic medication refill request. Pharmacy on file. Please advise. Next Visit Date:  Future Appointments   Date Time Provider John Dimasi   4/5/2021 11:30 AM Debby Obando MD [facility] AND WOM[facility]'S [facility] [address] Maintenance   Topic Date Due    Diabetic retinal exam  01/26/1978    DTaP/Tdap/Td vaccine (1 - Tdap) 01/26/1987    Flu vaccine (1) 09/01/2020    Shingles Vaccine (2 of 2) 02/24/2021    Hepatitis C screen  12/28/2021 (Originally 1970)    HIV screen  12/28/2021 (Originally 1/26/1983)    A1C test (Diabetic or Prediabetic)  03/28/2021    Diabetic foot exam  09/22/2021    Diabetic microalbuminuria test  12/28/2021    Lipid screen  12/28/2021    Potassium monitoring  12/28/2021    Creatinine monitoring  12/28/2021    Colon cancer screen colonoscopy  06/25/2029    Hepatitis B vaccine  Completed    Pneumococcal 0-64 years Vaccine  Completed    Hepatitis A vaccine  Aged Out    Hib vaccine  Aged Out    Meningococcal (ACWY) vaccine  Aged Out       Hemoglobin A1C (%)   Date Value   12/28/2020 10.3 (H)   09/22/2020 9.3   01/02/2020 8.6             ( goal A1C is < 7)   Microalb/Crt.  Ratio (mcg/mg creat)   Date Value   12/28/2020 CANNOT BE CALCULATED     LDL Cholesterol (mg/dL)   Date Value   12/28/2020 81   05/29/2019 95       (goal LDL is <100)   AST (U/L)   Date Value   12/28/2020 27     ALT (U/L)   Date Value   12/28/2020 37     BUN (mg/dL)   Date Value   12/28/2020 13     BP Readings from Last 3 Encounters:   12/28/20 120/84   09/22/20 131/88   02/03/20 126/81          (goal 120/80)    All Future Testing planned in [facility]  Lab Frequency Next Occurrence               Patient Active Problem List:     Type 2 diabetes mellitus with complication, without long-term current use of insulin (HCC)     Chest pain     Essential hypertension     Hypertriglyceridemia     Gastroesophageal reflux disease without esophagitis

## 2021-03-08 DIAGNOSIS — I10 ESSENTIAL HYPERTENSION: ICD-10-CM

## 2021-03-08 DIAGNOSIS — K21.9 GASTROESOPHAGEAL REFLUX DISEASE: ICD-10-CM

## 2021-03-08 DIAGNOSIS — E11.8 TYPE 2 DIABETES MELLITUS WITH COMPLICATION, WITHOUT LONG-TERM CURRENT USE OF INSULIN (HCC): ICD-10-CM

## 2021-03-08 DIAGNOSIS — E78.1 HYPERTRIGLYCERIDEMIA: ICD-10-CM

## 2021-03-08 RX ORDER — GLIPIZIDE 5 MG/1
TABLET ORAL
Qty: 180 TABLET | Refills: 0 | Status: SHIPPED | OUTPATIENT
Start: 2021-03-08 | End: 2021-04-05 | Stop reason: SDUPTHER

## 2021-03-08 RX ORDER — ERGOCALCIFEROL 1.25 MG/1
50000 CAPSULE ORAL WEEKLY
Qty: 36 CAPSULE | Refills: 1 | Status: SHIPPED | OUTPATIENT
Start: 2021-03-08 | End: 2021-04-05 | Stop reason: SDUPTHER

## 2021-03-08 RX ORDER — FENOFIBRATE 160 MG/1
TABLET ORAL
Qty: 90 TABLET | Refills: 0 | Status: SHIPPED | OUTPATIENT
Start: 2021-03-08 | End: 2021-04-05 | Stop reason: SDUPTHER

## 2021-03-08 RX ORDER — FAMOTIDINE 20 MG/1
TABLET, FILM COATED ORAL
Qty: 180 TABLET | Refills: 3 | Status: SHIPPED | OUTPATIENT
Start: 2021-03-08 | End: 2021-04-05 | Stop reason: SDUPTHER

## 2021-03-08 RX ORDER — LISINOPRIL 10 MG/1
10 TABLET ORAL DAILY
Qty: 90 TABLET | Refills: 0 | Status: SHIPPED | OUTPATIENT
Start: 2021-03-08 | End: 2021-04-05 | Stop reason: SDUPTHER

## 2021-03-08 RX ORDER — ALBUTEROL SULFATE 90 UG/1
1-2 AEROSOL, METERED RESPIRATORY (INHALATION) EVERY 4 HOURS PRN
Qty: 9 INHALER | Refills: 0 | Status: SHIPPED | OUTPATIENT
Start: 2021-03-08 | End: 2021-04-05 | Stop reason: SDUPTHER

## 2021-03-08 RX ORDER — PANTOPRAZOLE SODIUM 20 MG/1
TABLET, DELAYED RELEASE ORAL
Qty: 90 TABLET | Refills: 0 | Status: SHIPPED | OUTPATIENT
Start: 2021-03-08 | End: 2021-04-05 | Stop reason: SDUPTHER

## 2021-03-08 RX ORDER — BUDESONIDE AND FORMOTEROL FUMARATE DIHYDRATE 160; 4.5 UG/1; UG/1
2 AEROSOL RESPIRATORY (INHALATION) 2 TIMES DAILY
Qty: 4 INHALER | Refills: 0 | Status: SHIPPED | OUTPATIENT
Start: 2021-03-08 | End: 2021-04-05 | Stop reason: SDUPTHER

## 2021-03-08 RX ORDER — METFORMIN HYDROCHLORIDE 500 MG/1
TABLET, EXTENDED RELEASE ORAL
Qty: 180 TABLET | Refills: 0 | Status: SHIPPED | OUTPATIENT
Start: 2021-03-08 | End: 2021-04-05 | Stop reason: SDUPTHER

## 2021-03-08 RX ORDER — ATORVASTATIN CALCIUM 10 MG/1
10 TABLET, FILM COATED ORAL DAILY
Qty: 90 TABLET | Refills: 0 | Status: SHIPPED | OUTPATIENT
Start: 2021-03-08 | End: 2021-04-05 | Stop reason: SDUPTHER

## 2021-03-08 NOTE — TELEPHONE ENCOUNTER
Pt wife called in requesting refills on all his medications for 90 day supply pt is going to be leaving for a month and needs refills asap    . Please advise thank you!

## 2021-03-19 ENCOUNTER — TELEPHONE (OUTPATIENT)
Dept: FAMILY MEDICINE CLINIC | Age: 51
End: 2021-03-19

## 2021-04-05 ENCOUNTER — OFFICE VISIT (OUTPATIENT)
Dept: FAMILY MEDICINE CLINIC | Age: 51
End: 2021-04-05
Payer: COMMERCIAL

## 2021-04-05 VITALS
TEMPERATURE: 97.3 F | WEIGHT: 275 LBS | SYSTOLIC BLOOD PRESSURE: 118 MMHG | DIASTOLIC BLOOD PRESSURE: 82 MMHG | BODY MASS INDEX: 39.37 KG/M2 | OXYGEN SATURATION: 96 % | HEART RATE: 102 BPM | HEIGHT: 70 IN

## 2021-04-05 DIAGNOSIS — K21.9 GASTROESOPHAGEAL REFLUX DISEASE WITHOUT ESOPHAGITIS: ICD-10-CM

## 2021-04-05 DIAGNOSIS — R06.02 SHORTNESS OF BREATH: ICD-10-CM

## 2021-04-05 DIAGNOSIS — R05.9 COUGH: ICD-10-CM

## 2021-04-05 DIAGNOSIS — E11.8 TYPE 2 DIABETES MELLITUS WITH COMPLICATION, WITHOUT LONG-TERM CURRENT USE OF INSULIN (HCC): Primary | ICD-10-CM

## 2021-04-05 DIAGNOSIS — Z23 NEED FOR PROPHYLACTIC VACCINATION AND INOCULATION AGAINST VARICELLA: ICD-10-CM

## 2021-04-05 DIAGNOSIS — F17.200 SMOKER: ICD-10-CM

## 2021-04-05 DIAGNOSIS — I10 ESSENTIAL HYPERTENSION: ICD-10-CM

## 2021-04-05 DIAGNOSIS — Z23 NEED FOR TETANUS, DIPHTHERIA, AND ACELLULAR PERTUSSIS (TDAP) VACCINE: ICD-10-CM

## 2021-04-05 DIAGNOSIS — R06.2 WHEEZING: ICD-10-CM

## 2021-04-05 DIAGNOSIS — E55.9 VITAMIN D DEFICIENCY: ICD-10-CM

## 2021-04-05 DIAGNOSIS — E78.1 HYPERTRIGLYCERIDEMIA: ICD-10-CM

## 2021-04-05 LAB — HBA1C MFR BLD: 9.8 %

## 2021-04-05 PROCEDURE — 99214 OFFICE O/P EST MOD 30 MIN: CPT | Performed by: FAMILY MEDICINE

## 2021-04-05 PROCEDURE — 90715 TDAP VACCINE 7 YRS/> IM: CPT | Performed by: FAMILY MEDICINE

## 2021-04-05 PROCEDURE — 90471 IMMUNIZATION ADMIN: CPT | Performed by: FAMILY MEDICINE

## 2021-04-05 PROCEDURE — 83036 HEMOGLOBIN GLYCOSYLATED A1C: CPT | Performed by: FAMILY MEDICINE

## 2021-04-05 RX ORDER — METFORMIN HYDROCHLORIDE 500 MG/1
TABLET, EXTENDED RELEASE ORAL
Qty: 180 TABLET | Refills: 0 | Status: SHIPPED | OUTPATIENT
Start: 2021-04-05 | End: 2021-08-07

## 2021-04-05 RX ORDER — BUDESONIDE AND FORMOTEROL FUMARATE DIHYDRATE 160; 4.5 UG/1; UG/1
2 AEROSOL RESPIRATORY (INHALATION) 2 TIMES DAILY
Qty: 4 INHALER | Refills: 0 | Status: SHIPPED | OUTPATIENT
Start: 2021-04-05 | End: 2021-10-20 | Stop reason: SDUPTHER

## 2021-04-05 RX ORDER — FENOFIBRATE 160 MG/1
TABLET ORAL
Qty: 90 TABLET | Refills: 0 | Status: SHIPPED | OUTPATIENT
Start: 2021-04-05 | End: 2021-10-20 | Stop reason: SDUPTHER

## 2021-04-05 RX ORDER — FAMOTIDINE 20 MG/1
TABLET, FILM COATED ORAL
Qty: 180 TABLET | Refills: 3 | Status: SHIPPED | OUTPATIENT
Start: 2021-04-05 | End: 2021-10-20 | Stop reason: SDUPTHER

## 2021-04-05 RX ORDER — GLIPIZIDE 5 MG/1
TABLET ORAL
Qty: 180 TABLET | Refills: 0 | Status: SHIPPED | OUTPATIENT
Start: 2021-04-05 | End: 2021-10-20 | Stop reason: SDUPTHER

## 2021-04-05 RX ORDER — ERGOCALCIFEROL 1.25 MG/1
50000 CAPSULE ORAL WEEKLY
Qty: 36 CAPSULE | Refills: 1 | Status: SHIPPED | OUTPATIENT
Start: 2021-04-05 | End: 2021-10-20 | Stop reason: SDUPTHER

## 2021-04-05 RX ORDER — CHLORHEXIDINE GLUCONATE 0.12 MG/ML
RINSE ORAL
COMMUNITY
Start: 2021-02-01 | End: 2021-10-20 | Stop reason: SDUPTHER

## 2021-04-05 RX ORDER — DULAGLUTIDE 1.5 MG/.5ML
1.5 INJECTION, SOLUTION SUBCUTANEOUS WEEKLY
Qty: 4 PEN | Refills: 1 | Status: SHIPPED | OUTPATIENT
Start: 2021-04-05 | End: 2021-07-07 | Stop reason: DRUGHIGH

## 2021-04-05 RX ORDER — ATORVASTATIN CALCIUM 10 MG/1
10 TABLET, FILM COATED ORAL DAILY
Qty: 90 TABLET | Refills: 0 | Status: SHIPPED | OUTPATIENT
Start: 2021-04-05 | End: 2021-06-06

## 2021-04-05 RX ORDER — BUDESONIDE AND FORMOTEROL FUMARATE DIHYDRATE 160; 4.5 UG/1; UG/1
2 AEROSOL RESPIRATORY (INHALATION) 2 TIMES DAILY
Qty: 4 INHALER | Refills: 0 | Status: CANCELLED | OUTPATIENT
Start: 2021-04-05

## 2021-04-05 RX ORDER — LISINOPRIL 10 MG/1
10 TABLET ORAL DAILY
Qty: 90 TABLET | Refills: 0 | Status: SHIPPED | OUTPATIENT
Start: 2021-04-05 | End: 2021-07-07 | Stop reason: ALTCHOICE

## 2021-04-05 RX ORDER — PANTOPRAZOLE SODIUM 20 MG/1
TABLET, DELAYED RELEASE ORAL
Qty: 90 TABLET | Refills: 0 | Status: SHIPPED | OUTPATIENT
Start: 2021-04-05 | End: 2021-10-20 | Stop reason: SDUPTHER

## 2021-04-05 RX ORDER — ALBUTEROL SULFATE 90 UG/1
1-2 AEROSOL, METERED RESPIRATORY (INHALATION) EVERY 4 HOURS PRN
Qty: 9 INHALER | Refills: 0 | Status: SHIPPED | OUTPATIENT
Start: 2021-04-05 | End: 2021-07-07

## 2021-04-05 SDOH — HEALTH STABILITY: MENTAL HEALTH
STRESS IS WHEN SOMEONE FEELS TENSE, NERVOUS, ANXIOUS, OR CAN'T SLEEP AT NIGHT BECAUSE THEIR MIND IS TROUBLED. HOW STRESSED ARE YOU?: NOT AT ALL

## 2021-04-05 SDOH — HEALTH STABILITY: MENTAL HEALTH: HOW OFTEN DO YOU HAVE A DRINK CONTAINING ALCOHOL?: MONTHLY OR LESS

## 2021-04-05 SDOH — HEALTH STABILITY: MENTAL HEALTH: HOW MANY STANDARD DRINKS CONTAINING ALCOHOL DO YOU HAVE ON A TYPICAL DAY?: 1 OR 2

## 2021-04-05 ASSESSMENT — PATIENT HEALTH QUESTIONNAIRE - PHQ9
SUM OF ALL RESPONSES TO PHQ9 QUESTIONS 1 & 2: 0
SUM OF ALL RESPONSES TO PHQ QUESTIONS 1-9: 0
1. LITTLE INTEREST OR PLEASURE IN DOING THINGS: 0

## 2021-04-05 ASSESSMENT — ENCOUNTER SYMPTOMS
EYES NEGATIVE: 1
STRIDOR: 0
CHOKING: 0
WHEEZING: 0
SHORTNESS OF BREATH: 1
GASTROINTESTINAL NEGATIVE: 1
HEARTBURN: 0
APNEA: 0
COUGH: 1
CHEST TIGHTNESS: 0

## 2021-04-05 NOTE — PROGRESS NOTES
[address] CARE  [address] [place] [address]  Dept: [phone]  Dept Fax: [phone]    Kathryn Valenzuela is a 46 y.o. male who is a Established patient, who presents today for his medical conditions/complaints as noted below:  Chief Complaint   Patient presents with    Cough    Shortness of Breath    Establish Care         HPI:     The patient is a 46year old male with past medical history significant for diabetes, hypertension, GERD who presents for 3 months check up. The patient complains of a cough for the past few days. He has routinely had a cough first thing in the morning for years which he has presumed was due to smoking. For the past few days the cough has gotten worse and has been occurring throughout the day. It feels like something is stuck back in the throat. This was not triggered by eating. The patient is a diabetic and has been taking his medications. He does not check his blood sugars. Cough  This is a chronic problem. The current episode started more than 1 year ago. The problem has been rapidly worsening. The problem occurs constantly. The cough is productive of brown sputum. Associated symptoms include shortness of breath. Pertinent negatives include no chills, ear congestion, heartburn, myalgias, nasal congestion, postnasal drip, sweats, weight loss or wheezing. He has tried a beta-agonist inhaler for the symptoms. The treatment provided mild relief. There is no history of bronchiectasis, bronchitis, emphysema or environmental allergies. Hemoglobin A1C (%)   Date Value   04/05/2021 9.8   12/28/2020 10.3 (H)   09/22/2020 9.3             ( goal A1Cis < 7)   Microalb/Crt.  Ratio (mcg/mg creat)   Date Value   12/28/2020 CANNOT BE CALCULATED     LDL Cholesterol (mg/dL)   Date Value   12/28/2020 81   05/29/2019 95   07/12/2018 119       (goal LDL is <100)   AST (U/L)   Date Value   12/28/2020 27     ALT (U/L)   Date Value   12/28/2020 37     BUN (mg/dL)   Date Value   12/28/2020 13     BP Readings from Last 3 Encounters:   04/05/21 118/82   12/28/20 120/84   09/22/20 131/88          (goal 120/80)    Past Medical History:   Diagnosis Date    CAD (coronary artery disease)     Diabetes mellitus ([facility]     Essential hypertension 10/15/2018    Gastroesophageal reflux disease without esophagitis     Hypertriglyceridemia 10/15/2018    Type 2 diabetes mellitus with complication, without long-term current use of insulin ([facility] 7/11/2018      Past Surgical History:   Procedure Laterality Date    COLONOSCOPY N/A 6/25/2019    COLORECTAL CANCER SCREENING, NOT HIGH RISK performed by Annalisa Lewis MD at [facility] ENDOSCOPY N/A 6/25/2019    EGD BIOPSY performed by Annalisa Lewis MD at [id number] [place] [address].       Family History   Problem Relation Age of Onset    Diabetes Father     Cancer Maternal Grandmother     Heart Attack Paternal Grandfather     Breast Cancer Neg Hx     Colon Cancer Neg Hx     Heart Disease Neg Hx     Stroke Neg Hx     Prostate Cancer Neg Hx        Social History     Tobacco Use    Smoking status: Current Every Day Smoker     Packs/day: 1.00     Years: 0.00     Pack years: 0.00     Types: Cigarettes    Smokeless tobacco: Former User   Substance Use Topics    Alcohol use: Yes     Frequency: Monthly or less     Drinks per session: 1 or 2     Binge frequency: Never     Comment: few beers ocasionally      Current Outpatient Medications   Medication Sig Dispense Refill    zoster recombinant adjuvanted vaccine (SHINGRIX) 50 MCG/0.5ML SUSR injection Inject 0.5 mLs into the muscle once for 1 dose 50 MCG IM then repeat 2-6 months.  1 each 1    chlorhexidine (PERIDEX) 0.12 % solution RINSE WITH ONE HALF OUNCE TWICE DAILY AFTER BREAKFAST AND BEFORE BEDTIME      albuterol sulfate HFA (PROVENTIL HFA) 108 (90 Base) MCG/ACT inhaler Inhale 1-2 puffs into the lungs screen colonoscopy  06/25/2029    DTaP/Tdap/Td vaccine (2 - Td) 04/05/2031    Hepatitis B vaccine  Completed    Pneumococcal 0-64 years Vaccine  Completed    Hepatitis A vaccine  Aged Out    Hib vaccine  Aged Out    Meningococcal (ACWY) vaccine  Aged Out       Subjective:     Review of Systems   Constitutional: Negative. Negative for chills and weight loss. HENT: Negative. Negative for postnasal drip. Eyes: Negative. Respiratory: Positive for cough and shortness of breath. Negative for apnea, choking, chest tightness, wheezing and stridor. Cardiovascular: Negative. Gastrointestinal: Negative. Negative for heartburn. Genitourinary: Negative. Musculoskeletal: Negative. Negative for myalgias. Skin: Negative. Allergic/Immunologic: Negative for environmental allergies, food allergies and immunocompromised state. Psychiatric/Behavioral: Negative. Objective:     Physical Exam  Vitals signs and nursing note reviewed. Constitutional:       General: He is awake. He is not in acute distress. Appearance: Normal appearance. He is obese. He is not ill-appearing, toxic-appearing or diaphoretic. HENT:      Head: Normocephalic and atraumatic. Right Ear: Tympanic membrane, ear canal and external ear normal.      Left Ear: Tympanic membrane, ear canal and external ear normal.      Nose: Nose normal.      Mouth/Throat:      Mouth: Mucous membranes are moist.   Eyes:      Extraocular Movements: Extraocular movements intact. Conjunctiva/sclera: Conjunctivae normal.      Pupils: Pupils are equal, round, and reactive to light. Neck:      Musculoskeletal: Normal range of motion and neck supple. Cardiovascular:      Rate and Rhythm: Normal rate and regular rhythm. Pulses: Normal pulses. Heart sounds: Normal heart sounds. No murmur. No friction rub. No gallop. Pulmonary:      Effort: Pulmonary effort is normal. No respiratory distress.       Breath sounds: Normal breath sounds. No stridor. No wheezing, rhonchi or rales. Musculoskeletal: Normal range of motion. Skin:     Capillary Refill: Capillary refill takes less than 2 seconds. Neurological:      General: No focal deficit present. Mental Status: He is alert and oriented to person, place, and time. Cranial Nerves: No cranial nerve deficit. Psychiatric:         Attention and Perception: Attention normal.         Mood and Affect: Mood and affect normal.         Speech: Speech normal.         Behavior: Behavior normal.         Thought Content: Thought content normal.         Judgment: Judgment normal.       /82   Pulse 102   Temp 97.3 °F (36.3 °C)   Ht 5' 10\" (1.778 m)   Wt 275 lb (124.7 kg)   SpO2 96%   BMI 39.46 kg/m²     Assessment:       Diagnosis Orders   1. Type 2 diabetes mellitus with complication, without long-term current use of insulin (HCC)  POCT glycosylated hemoglobin (Hb A1C)    glipiZIDE (GLUCOTROL) 5 MG tablet    metFORMIN (GLUCOPHAGE-XR) 500 MG extended release tablet    Dulaglutide (TRULICITY) 1.5 VT/8.3JU SOPN   2. Essential hypertension  lisinopril (PRINIVIL;ZESTRIL) 10 MG tablet   3. Hypertriglyceridemia  atorvastatin (LIPITOR) 10 MG tablet    fenofibrate (TRIGLIDE) 160 MG tablet   4. Wheezing  atorvastatin (LIPITOR) 10 MG tablet    XR CHEST STANDARD (2 VW)   5. Shortness of breath  albuterol sulfate HFA (PROVENTIL HFA) 108 (90 Base) MCG/ACT inhaler    atorvastatin (LIPITOR) 10 MG tablet    XR CHEST STANDARD (2 VW)    CBC Auto Differential    Echocardiogram complete    budesonide-formoterol (SYMBICORT) 160-4.5 MCG/ACT AERO   6. Vitamin D deficiency  vitamin D (ERGOCALCIFEROL) 1.25 MG (13915 UT) CAPS capsule   7. Cough  atorvastatin (LIPITOR) 10 MG tablet    XR CHEST STANDARD (2 VW)   8. Gastroesophageal reflux disease without esophagitis     9.  Need for prophylactic vaccination and inoculation against varicella  zoster recombinant adjuvanted vaccine [facility]) 50 MCG/0.5ML Future     Standing Expiration Date:   6/4/2021     Order Specific Question:   Reason for exam:     Answer:   sob     Orders Placed This Encounter   Medications    zoster recombinant adjuvanted vaccine [facility]) 50 MCG/0.5ML SUSR injection     Sig: Inject 0.5 mLs into the muscle once for 1 dose 50 MCG IM then repeat 2-6 months. Dispense:  1 each     Refill:  1    albuterol sulfate HFA (PROVENTIL HFA) 108 (90 Base) MCG/ACT inhaler     Sig: Inhale 1-2 puffs into the lungs every 4 hours as needed for Wheezing or Shortness of Breath (Space out to every 6 hours as symptoms improve) Space out to every 6 hours as symptoms improve.      Dispense:  9 Inhaler     Refill:  0    atorvastatin (LIPITOR) 10 MG tablet     Sig: Take 1 tablet by mouth daily     Dispense:  90 tablet     Refill:  0    famotidine (PEPCID) 20 MG tablet     Sig: TAKE 1 TABLET BY MOUTH TWICE DAILY     Dispense:  180 tablet     Refill:  3     Please consider 90 day supplies to promote better adherence    fenofibrate (TRIGLIDE) 160 MG tablet     Sig: Take 1 tablet by mouth once daily     Dispense:  90 tablet     Refill:  0    glipiZIDE (GLUCOTROL) 5 MG tablet     Sig: Take 1 tablet by mouth twice daily     Dispense:  180 tablet     Refill:  0    lisinopril (PRINIVIL;ZESTRIL) 10 MG tablet     Sig: Take 1 tablet by mouth daily     Dispense:  90 tablet     Refill:  0    metFORMIN (GLUCOPHAGE-XR) 500 MG extended release tablet     Sig: TAKE 1 TABLET BY MOUTH ONCE DAILY BEFORE MEAL(S) FOR 14 DAYS AND THEN 1 TABLET TWICE DAILY BEFORE MEAL(S)     Dispense:  180 tablet     Refill:  0    pantoprazole (PROTONIX) 20 MG tablet     Sig: Take 1 tablet by mouth once daily     Dispense:  90 tablet     Refill:  0    vitamin D (ERGOCALCIFEROL) 1.25 MG (74086 UT) CAPS capsule     Sig: Take 1 capsule by mouth once a week     Dispense:  36 capsule     Refill:  1    budesonide-formoterol (SYMBICORT) 160-4.5 MCG/ACT AERO     Sig: Inhale 2 puffs into the lungs 2 times daily     Dispense:  4 Inhaler     Refill:  0    Dulaglutide (TRULICITY) 1.5 SK/2.1ZW SOPN     Sig: Inject 1.5 mg into the skin once a week     Dispense:  4 pen     Refill:  1       Patient given educational materials - see patient instructions. Discussed use, benefit, and side effects of prescribed medications. All patientquestions answered. Pt voiced understanding. Reviewed health maintenance. Instructedto continue current medications, diet and exercise. Patient agreed with treatmentplan. Follow up as directed.      Electronically signed by Sanjuanita Hines MD on 4/5/2021 at 7:26 PM

## 2021-06-04 DIAGNOSIS — R05.9 COUGH: ICD-10-CM

## 2021-06-04 DIAGNOSIS — E78.1 HYPERTRIGLYCERIDEMIA: ICD-10-CM

## 2021-06-04 DIAGNOSIS — R06.2 WHEEZING: ICD-10-CM

## 2021-06-04 DIAGNOSIS — R06.02 SHORTNESS OF BREATH: ICD-10-CM

## 2021-06-04 NOTE — TELEPHONE ENCOUNTER
Electronic medication refill request. Pharmacy on file. Please advise. Next Visit Date:  Future Appointments   Date Time Provider John Gordon   7/7/2021  9:15 AM Eber Dai MD [facility] [place] [facility] [address] Maintenance   Topic Date Due    Diabetic retinal exam  Never done    COVID-19 Vaccine (1) Never done    Hepatitis C screen  12/28/2021 (Originally 1970)    HIV screen  12/28/2021 (Originally 1/26/1985)    Shingles Vaccine (2 of 2) 04/05/2022 (Originally 2/24/2021)    A1C test (Diabetic or Prediabetic)  07/05/2021    Flu vaccine (Season Ended) 09/01/2021    Diabetic foot exam  09/22/2021    Diabetic microalbuminuria test  12/28/2021    Lipid screen  12/28/2021    Potassium monitoring  12/28/2021    Creatinine monitoring  12/28/2021    Colon cancer screen colonoscopy  06/25/2029    DTaP/Tdap/Td vaccine (2 - Td or Tdap) 04/05/2031    Pneumococcal 0-64 years Vaccine (2 of 2) 01/26/2033    Hepatitis B vaccine  Completed    Hepatitis A vaccine  Aged Out    Hib vaccine  Aged Out    Meningococcal (ACWY) vaccine  Aged Out       Hemoglobin A1C (%)   Date Value   04/05/2021 9.8   12/28/2020 10.3 (H)   09/22/2020 9.3             ( goal A1C is < 7)   Microalb/Crt.  Ratio (mcg/mg creat)   Date Value   12/28/2020 CANNOT BE CALCULATED     LDL Cholesterol (mg/dL)   Date Value   12/28/2020 81   05/29/2019 95       (goal LDL is <100)   AST (U/L)   Date Value   12/28/2020 27     ALT (U/L)   Date Value   12/28/2020 37     BUN (mg/dL)   Date Value   12/28/2020 13     BP Readings from Last 3 Encounters:   04/05/21 118/82   12/28/20 120/84   09/22/20 131/88          (goal 120/80)    All Future Testing planned in [facility]  Lab Frequency Next Occurrence   XR CHEST STANDARD (2 VW) Once 07/28/2021   CBC Auto Differential Once 07/28/2021   Echocardiogram complete Once 04/05/2021               Patient Active Problem List:     Type 2 diabetes mellitus with complication, without long-term current use of insulin (HCC)     Chest pain     Essential hypertension     Hypertriglyceridemia     Gastroesophageal reflux disease without esophagitis

## 2021-06-06 RX ORDER — ATORVASTATIN CALCIUM 10 MG/1
TABLET, FILM COATED ORAL
Qty: 90 TABLET | Refills: 0 | Status: SHIPPED | OUTPATIENT
Start: 2021-06-06 | End: 2021-10-20 | Stop reason: SDUPTHER

## 2021-07-07 ENCOUNTER — OFFICE VISIT (OUTPATIENT)
Dept: FAMILY MEDICINE CLINIC | Age: 51
End: 2021-07-07
Payer: COMMERCIAL

## 2021-07-07 VITALS
WEIGHT: 274.8 LBS | DIASTOLIC BLOOD PRESSURE: 86 MMHG | TEMPERATURE: 97.3 F | HEIGHT: 70 IN | BODY MASS INDEX: 39.34 KG/M2 | OXYGEN SATURATION: 97 % | SYSTOLIC BLOOD PRESSURE: 138 MMHG | HEART RATE: 80 BPM

## 2021-07-07 DIAGNOSIS — E78.1 HYPERTRIGLYCERIDEMIA: ICD-10-CM

## 2021-07-07 DIAGNOSIS — E55.9 VITAMIN D DEFICIENCY: ICD-10-CM

## 2021-07-07 DIAGNOSIS — R06.02 SHORTNESS OF BREATH: ICD-10-CM

## 2021-07-07 DIAGNOSIS — F17.200 SMOKER: ICD-10-CM

## 2021-07-07 DIAGNOSIS — I10 ESSENTIAL HYPERTENSION: ICD-10-CM

## 2021-07-07 DIAGNOSIS — K21.9 GASTROESOPHAGEAL REFLUX DISEASE WITHOUT ESOPHAGITIS: ICD-10-CM

## 2021-07-07 DIAGNOSIS — E66.01 CLASS 2 SEVERE OBESITY DUE TO EXCESS CALORIES WITH SERIOUS COMORBIDITY AND BODY MASS INDEX (BMI) OF 39.0 TO 39.9 IN ADULT (HCC): ICD-10-CM

## 2021-07-07 DIAGNOSIS — E11.8 TYPE 2 DIABETES MELLITUS WITH COMPLICATION, WITHOUT LONG-TERM CURRENT USE OF INSULIN (HCC): Primary | ICD-10-CM

## 2021-07-07 LAB — HBA1C MFR BLD: 10.2 %

## 2021-07-07 PROCEDURE — 83036 HEMOGLOBIN GLYCOSYLATED A1C: CPT | Performed by: FAMILY MEDICINE

## 2021-07-07 PROCEDURE — 99214 OFFICE O/P EST MOD 30 MIN: CPT | Performed by: FAMILY MEDICINE

## 2021-07-07 RX ORDER — TIOTROPIUM BROMIDE 18 UG/1
18 CAPSULE ORAL; RESPIRATORY (INHALATION) DAILY
Qty: 30 CAPSULE | Refills: 5 | Status: SHIPPED | OUTPATIENT
Start: 2021-07-07 | End: 2021-10-20 | Stop reason: SDUPTHER

## 2021-07-07 RX ORDER — LOSARTAN POTASSIUM 50 MG/1
50 TABLET ORAL DAILY
Qty: 30 TABLET | Refills: 2 | Status: SHIPPED | OUTPATIENT
Start: 2021-07-07 | End: 2021-10-20 | Stop reason: SDUPTHER

## 2021-07-07 RX ORDER — ALBUTEROL SULFATE 90 UG/1
1-2 AEROSOL, METERED RESPIRATORY (INHALATION) EVERY 4 HOURS PRN
Qty: 9 INHALER | Refills: 0 | Status: SHIPPED | OUTPATIENT
Start: 2021-07-07 | End: 2021-10-21

## 2021-07-07 NOTE — PROGRESS NOTES
[address] PRIMARY CARE  [address] [place] [address] [place] [address] [place] [address]  Dept: [phone]  Dept Fax: [phone]    Louann Blankenship is a 46 y.o. male who is a Established patient, who presents today for his medical conditions/complaints as noted below:  Chief Complaint   Patient presents with    Diabetes    3 Month Follow-Up         HPI:     The patient is a 64-year-old male with past medical history significant for morbid obesity, diabetes, hypertriglyceridemia, GERD and hypertension who presents today for 3-month checkup. The patient has been taking his medications intermittently but he has run out on a couple occasions. He works as a  and states medication compliance with the medication is difficult for him. He did run out of his injectable medication recently and went weeks without it. He also leads a fairly inactive lifestyle. He checks his blood sugars intermittently did not bring his blood sugar log. Diabetes  He presents for his follow-up diabetic visit. He has type 2 diabetes mellitus. His disease course has been stable. There are no hypoglycemic associated symptoms. There are no diabetic associated symptoms. There are no hypoglycemic complications. Symptoms are stable. There are no diabetic complications. Risk factors for coronary artery disease include diabetes mellitus, dyslipidemia, male sex, obesity, tobacco exposure, hypertension and sedentary lifestyle. Current diabetic treatment includes oral agent (dual therapy). He is compliant with treatment some of the time. His weight is stable. He is following a generally unhealthy diet. When asked about meal planning, he reported none. He rarely participates in exercise. An ACE inhibitor/angiotensin II receptor blocker is being taken. He does not see a podiatrist.Eye exam is not current. COPD  He complains of chest tightness, cough, difficulty breathing, shortness of breath, sputum production and wheezing. There is no frequent throat clearing, hemoptysis or hoarse voice. This is a chronic problem. The current episode started more than 1 year ago. The problem has been gradually worsening. The cough is productive of sputum. Hemoglobin A1C (%)   Date Value   07/07/2021 10.2   04/05/2021 9.8   12/28/2020 10.3 (H)             ( goal A1Cis < 7)   Microalb/Crt.  Ratio (mcg/mg creat)   Date Value   12/28/2020 CANNOT BE CALCULATED     LDL Cholesterol (mg/dL)   Date Value   12/28/2020 81   05/29/2019 95   07/12/2018 119       (goal LDL is <100)   AST (U/L)   Date Value   12/28/2020 27     ALT (U/L)   Date Value   12/28/2020 37     BUN (mg/dL)   Date Value   12/28/2020 13     BP Readings from Last 3 Encounters:   07/07/21 138/86   04/05/21 118/82   12/28/20 120/84          (goal 120/80)    Past Medical History:   Diagnosis Date    CAD (coronary artery disease)     Diabetes mellitus ([facility]     Essential hypertension 10/15/2018    Gastroesophageal reflux disease without esophagitis     Hypertriglyceridemia 10/15/2018    Type 2 diabetes mellitus with complication, without long-term current use of insulin ([facility] 7/11/2018      Past Surgical History:   Procedure Laterality Date    COLONOSCOPY N/A 6/25/2019    COLORECTAL CANCER SCREENING, NOT HIGH RISK performed by Minnie Heard MD at [address] N/A 6/25/2019    EGD BIOPSY performed by Minnie Heard MD at [id number] [place] [address].       Family History   Problem Relation Age of Onset    Diabetes Father     Cancer Maternal Grandmother     Heart Attack Paternal Grandfather     Breast Cancer Neg Hx     Colon Cancer Neg Hx     Heart Disease Neg Hx     Stroke Neg Hx     Prostate Cancer Neg Hx        Social History     Tobacco Use    Smoking status: Current Every Day Smoker     Packs/day: 1.00     Years: 0.00     Pack years: 0.00     Types: Cigarettes    Smokeless tobacco: Former User   Substance Use Topics    Alcohol use: Yes     Comment: few beers ocasionally      Current Outpatient Medications   Medication Sig Dispense Refill    losartan (COZAAR) 50 MG tablet Take 1 tablet by mouth daily 30 tablet 2    Dulaglutide 3 MG/0.5ML SOPN Inject 3 mg into the skin once a week 4 pen 2    albuterol sulfate HFA (PROVENTIL HFA) 108 (90 Base) MCG/ACT inhaler Inhale 1-2 puffs into the lungs every 4 hours as needed for Wheezing or Shortness of Breath (Space out to every 6 hours as symptoms improve) Space out to every 6 hours as symptoms improve. 9 Inhaler 0    tiotropium (SPIRIVA HANDIHALER) 18 MCG inhalation capsule Inhale 1 capsule into the lungs daily 30 capsule 5    atorvastatin (LIPITOR) 10 MG tablet Take 1 tablet by mouth once daily 90 tablet 0    chlorhexidine (PERIDEX) 0.12 % solution RINSE WITH ONE HALF OUNCE TWICE DAILY AFTER BREAKFAST AND BEFORE BEDTIME      famotidine (PEPCID) 20 MG tablet TAKE 1 TABLET BY MOUTH TWICE DAILY 180 tablet 3    fenofibrate (TRIGLIDE) 160 MG tablet Take 1 tablet by mouth once daily 90 tablet 0    glipiZIDE (GLUCOTROL) 5 MG tablet Take 1 tablet by mouth twice daily 180 tablet 0    metFORMIN (GLUCOPHAGE-XR) 500 MG extended release tablet TAKE 1 TABLET BY MOUTH ONCE DAILY BEFORE MEAL(S) FOR 14 DAYS AND THEN 1 TABLET TWICE DAILY BEFORE MEAL(S) 180 tablet 0    pantoprazole (PROTONIX) 20 MG tablet Take 1 tablet by mouth once daily 90 tablet 0    vitamin D (ERGOCALCIFEROL) 1.25 MG (38323 UT) CAPS capsule Take 1 capsule by mouth once a week 36 capsule 1    budesonide-formoterol (SYMBICORT) 160-4.5 MCG/ACT AERO Inhale 2 puffs into the lungs 2 times daily 4 Inhaler 0     No current facility-administered medications for this visit.      Allergies   Allergen Reactions    Toradol [Ketorolac Tromethamine] Shortness Of Breath       Health Maintenance   Topic Date Due    Diabetic retinal exam  Never done    COVID-19 Vaccine (1) Never done    Hepatitis C screen  12/28/2021 (Originally 1970)  HIV screen  12/28/2021 (Originally 1/26/1985)    Shingles Vaccine (2 of 2) 04/05/2022 (Originally 2/24/2021)    Flu vaccine (1) 09/01/2021    Diabetic foot exam  09/22/2021    A1C test (Diabetic or Prediabetic)  10/07/2021    Diabetic microalbuminuria test  12/28/2021    Lipid screen  12/28/2021    Potassium monitoring  12/28/2021    Creatinine monitoring  12/28/2021    Colon cancer screen colonoscopy  06/25/2029    DTaP/Tdap/Td vaccine (2 - Td or Tdap) 04/05/2031    Pneumococcal 0-64 years Vaccine (2 of 2 - PPSV23) 01/26/2033    Hepatitis B vaccine  Completed    Hepatitis A vaccine  Aged Out    Hib vaccine  Aged Out    Meningococcal (ACWY) vaccine  Aged Out       Subjective:     Review of Systems   Constitutional: Negative. HENT: Negative. Negative for hoarse voice. Eyes: Negative. Respiratory: Positive for cough, sputum production, shortness of breath and wheezing. Negative for hemoptysis. Cardiovascular: Negative. Gastrointestinal: Negative. Genitourinary: Negative. Musculoskeletal: Negative. Skin: Negative. Allergic/Immunologic: Negative for environmental allergies, food allergies and immunocompromised state. Neurological: Negative. Psychiatric/Behavioral: Negative. Objective:     Physical Exam  Vitals and nursing note reviewed. Constitutional:       General: He is awake. He is not in acute distress. Appearance: Normal appearance. He is obese. He is not ill-appearing, toxic-appearing or diaphoretic. HENT:      Head: Normocephalic and atraumatic. Right Ear: External ear normal.      Left Ear: External ear normal.      Nose: Nose normal.      Mouth/Throat:      Mouth: Mucous membranes are moist.   Eyes:      Extraocular Movements: Extraocular movements intact. Conjunctiva/sclera: Conjunctivae normal.      Pupils: Pupils are equal, round, and reactive to light. Cardiovascular:      Rate and Rhythm: Normal rate and regular rhythm. Pulses: Normal pulses. Heart sounds: Normal heart sounds. No murmur heard. No friction rub. No gallop. Pulmonary:      Effort: Pulmonary effort is normal. No respiratory distress. Breath sounds: No stridor. Wheezing present. No rhonchi or rales. Abdominal:      General: Abdomen is flat. Bowel sounds are normal.      Palpations: Abdomen is soft. Musculoskeletal:         General: Normal range of motion. Cervical back: Normal range of motion. Neurological:      General: No focal deficit present. Mental Status: He is alert and oriented to person, place, and time. Mental status is at baseline. Psychiatric:         Attention and Perception: Attention normal.         Mood and Affect: Mood and affect normal.         Speech: Speech normal.         Behavior: Behavior normal.         Thought Content: Thought content normal.         Judgment: Judgment normal.       /86   Pulse 80   Temp 97.3 °F (36.3 °C)   Ht 5' 10\" (1.778 m)   Wt 274 lb 12.8 oz (124.6 kg)   SpO2 97%   BMI 39.43 kg/m²     Assessment:       Diagnosis Orders   1. Type 2 diabetes mellitus with complication, without long-term current use of insulin ([facility])  POCT glycosylated hemoglobin (Hb A1C)    Lipid, Fasting    Comprehensive Metabolic Panel, Fasting    Dulaglutide 3 MG/0.5ML SOPN   2. Essential hypertension  Comprehensive Metabolic Panel, Fasting    CBC Auto Differential    TSH With Reflex Ft4    losartan (COZAAR) 50 MG tablet   3. Hypertriglyceridemia  Lipid, Fasting   4. Vitamin D deficiency  Vitamin D 25 Hydroxy   5. Gastroesophageal reflux disease without esophagitis     6. Smoker  tiotropium (SPIRIVA HANDIHALER) 18 MCG inhalation capsule   7. Class 2 severe obesity due to excess calories with serious comorbidity and body mass index (BMI) of 39.0 to 39.9 in adult ([facility])  TSH With Reflex Ft4   8.  Shortness of breath  albuterol sulfate HFA (PROVENTIL HFA) 108 (90 Base) MCG/ACT inhaler             Plan: Diabetespatient's A1c is increased. The patient has been noncompliant with his medications. I did discuss with him the importance of compliance as this does help with control with prevention of complications from this condition. Advised patient if a1c worsens we will need to consider adding insulin. Patient to return in 3 months with repeat a1c. HTN - patient's blood pressure control is poor especially in light of diabetes. Patient encouraged to work towards bp <130/80. Patient on losartan. Get fasting labs as discussed    Hypertriglyceridemia - patient to continue on medications as discussed at appointment. Patient encouraged to work towards dietary changes to help with control. Vitamin D deficiencycheck vitamin D level and replace as needed. GERDuse medications as needed. As symptoms improve we will titrate the patient down on the medication and potentially switch to Pepcid or other H2 blocker. Dietary changes and weight loss would likely help. Smoker/shortness of breathpatient likely has COPD. Advised him to quit smoking. We will add Spiriva to patient's current medications. He has been noncompliant with Symbicort because of cost.  We will refill the albuterol inhaler patient advised to try medication prescription services that lower the cost is good Rx. Obesitydiscussed with patient importance of weight loss to help prevent cardiac disease. Return in about 3 months (around 10/7/2021) for hypertension, diabetes, 15 minutes.     Orders Placed This Encounter   Procedures    Lipid, Fasting     Standing Status:   Future     Standing Expiration Date:   7/7/2022    Comprehensive Metabolic Panel, Fasting     Standing Status:   Future     Standing Expiration Date:   7/7/2022    CBC Auto Differential     Standing Status:   Future     Standing Expiration Date:   7/7/2022    Vitamin D 25 Hydroxy     Standing Status:   Future     Standing Expiration Date:   7/7/2022    TSH With Reflex Ft4     Standing Status:   Future     Standing Expiration Date:   7/7/2022    POCT glycosylated hemoglobin (Hb A1C)     Orders Placed This Encounter   Medications    losartan (COZAAR) 50 MG tablet     Sig: Take 1 tablet by mouth daily     Dispense:  30 tablet     Refill:  2    Dulaglutide 3 MG/0.5ML SOPN     Sig: Inject 3 mg into the skin once a week     Dispense:  4 pen     Refill:  2    albuterol sulfate HFA (PROVENTIL HFA) 108 (90 Base) MCG/ACT inhaler     Sig: Inhale 1-2 puffs into the lungs every 4 hours as needed for Wheezing or Shortness of Breath (Space out to every 6 hours as symptoms improve) Space out to every 6 hours as symptoms improve. Dispense:  9 Inhaler     Refill:  0    tiotropium (SPIRIVA HANDIHALER) 18 MCG inhalation capsule     Sig: Inhale 1 capsule into the lungs daily     Dispense:  30 capsule     Refill:  5       Patient given educational materials - see patient instructions. Discussed use, benefit, and side effects of prescribed medications. All patientquestions answered. Pt voiced understanding. Reviewed health maintenance. Instructedto continue current medications, diet and exercise. Patient agreed with treatmentplan. Follow up as directed.      Electronically signed by Emma Courtney MD on 7/11/2021 at 2:56 PM

## 2021-07-11 ASSESSMENT — ENCOUNTER SYMPTOMS
HOARSE VOICE: 0
GASTROINTESTINAL NEGATIVE: 1
COUGH: 1
FREQUENT THROAT CLEARING: 0
HEMOPTYSIS: 0
SPUTUM PRODUCTION: 1
WHEEZING: 1
SHORTNESS OF BREATH: 1
CHEST TIGHTNESS: 1
DIFFICULTY BREATHING: 1
EYES NEGATIVE: 1

## 2021-07-11 ASSESSMENT — COPD QUESTIONNAIRES: COPD: 1

## 2021-08-06 DIAGNOSIS — E11.8 TYPE 2 DIABETES MELLITUS WITH COMPLICATION, WITHOUT LONG-TERM CURRENT USE OF INSULIN (HCC): ICD-10-CM

## 2021-08-07 RX ORDER — METFORMIN HYDROCHLORIDE 500 MG/1
TABLET, EXTENDED RELEASE ORAL
Qty: 180 TABLET | Refills: 0 | Status: SHIPPED | OUTPATIENT
Start: 2021-08-07 | End: 2021-09-15 | Stop reason: SDUPTHER

## 2021-08-09 ENCOUNTER — HOSPITAL ENCOUNTER (OUTPATIENT)
Dept: GENERAL RADIOLOGY | Facility: CLINIC | Age: 51
Discharge: HOME | End: 2021-08-11
Payer: COMMERCIAL

## 2021-08-09 ENCOUNTER — HOSPITAL ENCOUNTER (OUTPATIENT)
Facility: CLINIC | Age: 51
Discharge: HOME | End: 2021-08-11
Payer: COMMERCIAL

## 2021-08-09 ENCOUNTER — HOSPITAL ENCOUNTER (OUTPATIENT)
Age: 51
Setting detail: SPECIMEN
Discharge: HOME | End: 2021-08-09
Payer: COMMERCIAL

## 2021-08-09 DIAGNOSIS — R05.9 COUGH: ICD-10-CM

## 2021-08-09 DIAGNOSIS — R06.2 WHEEZING: ICD-10-CM

## 2021-08-09 DIAGNOSIS — R06.02 SHORTNESS OF BREATH: ICD-10-CM

## 2021-08-09 LAB
ABSOLUTE EOS #: 0.2 K/UL (ref 0–0.44)
ABSOLUTE IMMATURE GRANULOCYTE: 0.05 K/UL (ref 0–0.3)
ABSOLUTE LYMPH #: 2.86 K/UL (ref 1.1–3.7)
ABSOLUTE MONO #: 0.77 K/UL (ref 0.1–1.2)
BASOPHILS # BLD: 1 % (ref 0–2)
BASOPHILS ABSOLUTE: 0.05 K/UL (ref 0–0.2)
DIFFERENTIAL TYPE: ABNORMAL
EOSINOPHILS RELATIVE PERCENT: 2 % (ref 1–4)
HCT VFR BLD CALC: 41.5 % (ref 40.7–50.3)
HEMOGLOBIN: 13.9 G/DL (ref 13–17)
IMMATURE GRANULOCYTES: 1 %
LYMPHOCYTES # BLD: 27 % (ref 24–43)
MCH RBC QN AUTO: 33.2 PG (ref 25.2–33.5)
MCHC RBC AUTO-ENTMCNC: 33.5 G/DL (ref 28.4–34.8)
MCV RBC AUTO: 99 FL (ref 82.6–102.9)
MONOCYTES # BLD: 7 % (ref 3–12)
NRBC AUTOMATED: 0 PER 100 WBC
PDW BLD-RTO: 12.7 % (ref 11.8–14.4)
PLATELET # BLD: 256 K/UL (ref 138–453)
PLATELET ESTIMATE: ABNORMAL
PMV BLD AUTO: 12.2 FL (ref 8.1–13.5)
RBC # BLD: 4.19 M/UL (ref 4.21–5.77)
RBC # BLD: ABNORMAL 10*6/UL
SEG NEUTROPHILS: 62 % (ref 36–65)
SEGMENTED NEUTROPHILS ABSOLUTE COUNT: 6.57 K/UL (ref 1.5–8.1)
WBC # BLD: 10.5 K/UL (ref 3.5–11.3)
WBC # BLD: ABNORMAL 10*3/UL

## 2021-08-09 PROCEDURE — 71046 X-RAY EXAM CHEST 2 VIEWS: CPT

## 2021-09-14 ENCOUNTER — TELEPHONE (OUTPATIENT)
Dept: FAMILY MEDICINE CLINIC | Age: 51
End: 2021-09-14

## 2021-09-14 DIAGNOSIS — E11.8 TYPE 2 DIABETES MELLITUS WITH COMPLICATION, WITHOUT LONG-TERM CURRENT USE OF INSULIN (HCC): ICD-10-CM

## 2021-09-15 RX ORDER — METFORMIN HYDROCHLORIDE 500 MG/1
1000 TABLET, EXTENDED RELEASE ORAL 2 TIMES DAILY
Qty: 120 TABLET | Refills: 0 | Status: SHIPPED | OUTPATIENT
Start: 2021-09-15 | End: 2021-10-20 | Stop reason: SDUPTHER

## 2021-09-15 NOTE — TELEPHONE ENCOUNTER
The only other alternative is increasing his glipizide, and metformin. Ideally also weight loss and healthy diet would control the diabetes better. The other medication alternatives are expensive as well.

## 2021-09-15 NOTE — TELEPHONE ENCOUNTER
Made patient aware he verbally understood, scheduled with dr Clearnce Olszewski for next visit due to scheduling

## 2021-10-11 ENCOUNTER — OFFICE VISIT (OUTPATIENT)
Dept: FAMILY MEDICINE CLINIC | Age: 51
End: 2021-10-11
Payer: COMMERCIAL

## 2021-10-11 VITALS
BODY MASS INDEX: 38.22 KG/M2 | HEIGHT: 70 IN | SYSTOLIC BLOOD PRESSURE: 126 MMHG | HEART RATE: 103 BPM | TEMPERATURE: 97 F | WEIGHT: 267 LBS | DIASTOLIC BLOOD PRESSURE: 84 MMHG | OXYGEN SATURATION: 96 %

## 2021-10-11 DIAGNOSIS — Z72.0 TOBACCO ABUSE: ICD-10-CM

## 2021-10-11 DIAGNOSIS — I10 ESSENTIAL HYPERTENSION: ICD-10-CM

## 2021-10-11 DIAGNOSIS — F17.200 SMOKER: ICD-10-CM

## 2021-10-11 DIAGNOSIS — F41.9 ANXIETY: ICD-10-CM

## 2021-10-11 DIAGNOSIS — R25.2 MUSCLE CRAMPS: ICD-10-CM

## 2021-10-11 DIAGNOSIS — E11.9 TYPE 2 DIABETES MELLITUS WITHOUT COMPLICATION, WITHOUT LONG-TERM CURRENT USE OF INSULIN (HCC): Primary | ICD-10-CM

## 2021-10-11 LAB — HBA1C MFR BLD: 9.2 %

## 2021-10-11 PROCEDURE — 99406 BEHAV CHNG SMOKING 3-10 MIN: CPT | Performed by: STUDENT IN AN ORGANIZED HEALTH CARE EDUCATION/TRAINING PROGRAM

## 2021-10-11 PROCEDURE — 99214 OFFICE O/P EST MOD 30 MIN: CPT | Performed by: STUDENT IN AN ORGANIZED HEALTH CARE EDUCATION/TRAINING PROGRAM

## 2021-10-11 PROCEDURE — 83036 HEMOGLOBIN GLYCOSYLATED A1C: CPT | Performed by: STUDENT IN AN ORGANIZED HEALTH CARE EDUCATION/TRAINING PROGRAM

## 2021-10-11 RX ORDER — BUPROPION HYDROCHLORIDE 150 MG/1
150 TABLET ORAL EVERY MORNING
Qty: 30 TABLET | Refills: 3 | Status: SHIPPED | OUTPATIENT
Start: 2021-10-11 | End: 2021-10-20 | Stop reason: SDUPTHER

## 2021-10-11 SDOH — ECONOMIC STABILITY: FOOD INSECURITY: WITHIN THE PAST 12 MONTHS, YOU WORRIED THAT YOUR FOOD WOULD RUN OUT BEFORE YOU GOT MONEY TO BUY MORE.: NEVER TRUE

## 2021-10-11 SDOH — ECONOMIC STABILITY: FOOD INSECURITY: WITHIN THE PAST 12 MONTHS, THE FOOD YOU BOUGHT JUST DIDN'T LAST AND YOU DIDN'T HAVE MONEY TO GET MORE.: NEVER TRUE

## 2021-10-11 ASSESSMENT — ENCOUNTER SYMPTOMS
CHEST TIGHTNESS: 0
WHEEZING: 0
COUGH: 0
CONSTIPATION: 0
DIARRHEA: 0
VOMITING: 0
ABDOMINAL PAIN: 0
NAUSEA: 0
EYE DISCHARGE: 0
SHORTNESS OF BREATH: 0
SORE THROAT: 0

## 2021-10-11 ASSESSMENT — SOCIAL DETERMINANTS OF HEALTH (SDOH): HOW HARD IS IT FOR YOU TO PAY FOR THE VERY BASICS LIKE FOOD, HOUSING, MEDICAL CARE, AND HEATING?: NOT HARD AT ALL

## 2021-10-11 NOTE — PROGRESS NOTES
[address] PRIMARY CARE  [address] [place] [address]  Dept: [phone]  Dept Fax: [phone]    Blanca Bobby is a 46 y.o. male who is a Established patient, who presents today for his medical conditions/complaints as noted below:  Chief Complaint   Patient presents with    Hypertension    Diabetes         HPI:     He is here today to follow-up on diabetes and hypertension. Says that he has been taking Metformin 1000 mg twice daily and glipizide 10 mg twice daily. Says that he was started on Trulicity but could not afford the medication. He says his breathing is better after he was started on Spiriva and he has been using it every day. He does have morning cough and shortness of breath with exertion. He is also a current everyday smoker, smoking about 1-1/2 packs a day. Says that he is a  and if he does not smoke he gets very anxious and gets into a road rage. He has never previously considered quitting smoking because of this. Hemoglobin A1C (%)   Date Value   07/07/2021 10.2   04/05/2021 9.8   12/28/2020 10.3 (H)             ( goal A1Cis < 7)   Microalb/Crt.  Ratio (mcg/mg creat)   Date Value   12/28/2020 CANNOT BE CALCULATED     LDL Cholesterol (mg/dL)   Date Value   12/28/2020 81   05/29/2019 95   07/12/2018 119       (goal LDL is <100)   AST (U/L)   Date Value   12/28/2020 27     ALT (U/L)   Date Value   12/28/2020 37     BUN (mg/dL)   Date Value   12/28/2020 13     BP Readings from Last 3 Encounters:   10/11/21 126/84   07/07/21 138/86   04/05/21 118/82          (goal 120/80)    Past Medical History:   Diagnosis Date    Anxiety 10/11/2021    CAD (coronary artery disease)     Diabetes mellitus ([address]     Essential hypertension 10/15/2018    Gastroesophageal reflux disease without esophagitis     Hypertriglyceridemia 10/15/2018    Type 2 diabetes mellitus with complication, without long-term current use of insulin ([address] 7/11/2018 Past Surgical History:   Procedure Laterality Date    COLONOSCOPY N/A 6/25/2019    COLORECTAL CANCER SCREENING, NOT HIGH RISK performed by Rose Marie Ha MD at [facility] ENDOSCOPY N/A 6/25/2019    EGD BIOPSY performed by Rose Marie Ha MD at [address].       Family History   Problem Relation Age of Onset    Diabetes Father     Cancer Maternal Grandmother     Heart Attack Paternal Grandfather     Breast Cancer Neg Hx     Colon Cancer Neg Hx     Heart Disease Neg Hx     Stroke Neg Hx     Prostate Cancer Neg Hx        Social History     Tobacco Use    Smoking status: Current Every Day Smoker     Packs/day: 1.00     Years: 0.00     Pack years: 0.00     Types: Cigarettes    Smokeless tobacco: Former User   Substance Use Topics    Alcohol use: Yes     Comment: few beers ocasionally      Current Outpatient Medications   Medication Sig Dispense Refill    buPROPion (WELLBUTRIN XL) 150 MG extended release tablet Take 1 tablet by mouth every morning 30 tablet 3    metFORMIN (GLUCOPHAGE-XR) 500 MG extended release tablet Take 2 tablets by mouth 2 times daily 120 tablet 0    losartan (COZAAR) 50 MG tablet Take 1 tablet by mouth daily 30 tablet 2    albuterol sulfate HFA (PROVENTIL HFA) 108 (90 Base) MCG/ACT inhaler Inhale 1-2 puffs into the lungs every 4 hours as needed for Wheezing or Shortness of Breath (Space out to every 6 hours as symptoms improve) Space out to every 6 hours as symptoms improve.  9 Inhaler 0    tiotropium (SPIRIVA HANDIHALER) 18 MCG inhalation capsule Inhale 1 capsule into the lungs daily 30 capsule 5    atorvastatin (LIPITOR) 10 MG tablet Take 1 tablet by mouth once daily 90 tablet 0    chlorhexidine (PERIDEX) 0.12 % solution RINSE WITH ONE HALF OUNCE TWICE DAILY AFTER BREAKFAST AND BEFORE BEDTIME      famotidine (PEPCID) 20 MG tablet TAKE 1 TABLET BY MOUTH TWICE DAILY 180 tablet 3    fenofibrate (TRIGLIDE) 160 MG tablet Take 1 tablet by mouth once daily 90 tablet 0    glipiZIDE (GLUCOTROL) 5 MG tablet Take 1 tablet by mouth twice daily (Patient taking differently: Take 2 tablet by mouth twice daily) 180 tablet 0    pantoprazole (PROTONIX) 20 MG tablet Take 1 tablet by mouth once daily 90 tablet 0    vitamin D (ERGOCALCIFEROL) 1.25 MG (68428 UT) CAPS capsule Take 1 capsule by mouth once a week 36 capsule 1    budesonide-formoterol (SYMBICORT) 160-4.5 MCG/ACT AERO Inhale 2 puffs into the lungs 2 times daily 4 Inhaler 0     No current facility-administered medications for this visit. Allergies   Allergen Reactions    Toradol [Ketorolac Tromethamine] Shortness Of Breath       Health Maintenance   Topic Date Due    Diabetic retinal exam  Never done    COVID-19 Vaccine (1) Never done    A1C test (Diabetic or Prediabetic)  10/07/2021    Hepatitis C screen  12/28/2021 (Originally 1970)    HIV screen  12/28/2021 (Originally 1/26/1985)    Shingles Vaccine (2 of 2) 04/05/2022 (Originally 2/24/2021)    Flu vaccine (1) 10/11/2022 (Originally 9/1/2021)    Diabetic microalbuminuria test  12/28/2021    Lipid screen  12/28/2021    Potassium monitoring  12/28/2021    Creatinine monitoring  12/28/2021    Diabetic foot exam  10/11/2022    Colon cancer screen colonoscopy  06/25/2029    DTaP/Tdap/Td vaccine (2 - Td or Tdap) 04/05/2031    Pneumococcal 0-64 years Vaccine (2 of 2 - PPSV23) 01/26/2033    Hepatitis B vaccine  Completed    Hepatitis A vaccine  Aged Out    Hib vaccine  Aged Out    Meningococcal (ACWY) vaccine  Aged Out       Subjective:     Review of Systems   Constitutional: Negative for appetite change, fatigue and fever. HENT: Negative for congestion, ear pain, hearing loss and sore throat. Eyes: Negative for discharge and visual disturbance. Respiratory: Negative for cough, chest tightness, shortness of breath and wheezing.     Cardiovascular: Negative for chest pain, palpitations and leg swelling. Gastrointestinal: Negative for abdominal pain, constipation, diarrhea, nausea and vomiting. Genitourinary: Negative for flank pain, frequency, hematuria and urgency. Musculoskeletal: Negative for arthralgias, gait problem, joint swelling and myalgias. Muscle cramps   Skin: Negative. Neurological: Negative for dizziness, weakness, numbness and headaches. Psychiatric/Behavioral: Negative. Negative for dysphoric mood. The patient is not nervous/anxious. Objective:     Physical Exam  Vitals reviewed. Constitutional:       Appearance: Normal appearance. He is normal weight. HENT:      Head: Normocephalic and atraumatic. Nose: Nose normal.      Mouth/Throat:      Mouth: Mucous membranes are moist.      Pharynx: Oropharynx is clear. Eyes:      Extraocular Movements: Extraocular movements intact. Conjunctiva/sclera: Conjunctivae normal.      Pupils: Pupils are equal, round, and reactive to light. Cardiovascular:      Rate and Rhythm: Normal rate and regular rhythm. Pulses:           Dorsalis pedis pulses are 2+ on the right side and 2+ on the left side. Heart sounds: Normal heart sounds. No murmur heard. No gallop. Pulmonary:      Effort: Pulmonary effort is normal. No respiratory distress. Breath sounds: Normal breath sounds. No stridor. No wheezing. Abdominal:      General: Bowel sounds are normal. There is no distension. Palpations: Abdomen is soft. Tenderness: There is no abdominal tenderness. There is no guarding or rebound. Musculoskeletal:         General: No swelling or tenderness. Normal range of motion. Cervical back: Normal range of motion and neck supple. Right foot: Normal range of motion. No deformity. Left foot: Normal range of motion. No deformity. Feet:      Right foot:      Protective Sensation: 10 sites tested. 10 sites sensed. Skin integrity: Dry skin and fissure present.       Toenail Condition: Right toenails are normal.      Left foot:      Protective Sensation: 10 sites tested. 10 sites sensed. Skin integrity: Skin breakdown, dry skin and fissure present. Toenail Condition: Left toenails are normal.   Skin:     General: Skin is warm and dry. Coloration: Skin is not jaundiced. Findings: No rash. Neurological:      General: No focal deficit present. Mental Status: He is alert and oriented to person, place, and time. Psychiatric:         Mood and Affect: Mood normal.         Behavior: Behavior normal.         Thought Content: Thought content normal.         Judgment: Judgment normal.       /84   Pulse 103   Temp 97 °F (36.1 °C)   Ht 5' 10\" (1.778 m)   Wt 267 lb (121.1 kg)   SpO2 96%   BMI 38.31 kg/m²     Assessment/Plan:   1. Type 2 diabetes mellitus without complication, without long-term current use of insulin ([facility])  -     POCT glycosylated hemoglobin (Hb A1C)  -      DIABETES FOOT EXAM  -     Lipid Panel; Future  -     [facility] D[facility], [facility]iat[facility], [place]  2. Essential hypertension  -     CBC Auto Differential; Future  -     Comprehensive Metabolic Panel; Future  3. Anxiety  -     TSH with Reflex; Future  -     buPROPion (WELLBUTRIN XL) 150 MG extended release tablet; Take 1 tablet by mouth every morning, Disp-30 tablet, R-3Normal  4. Tobacco abuse  -     buPROPion (WELLBUTRIN XL) 150 MG extended release tablet; Take 1 tablet by mouth every morning, Disp-30 tablet, R-3Normal  5. Smoker  -     Full PFT Study With Bronchodilator; Future  6. Muscle cramps  -     Magnesium; Future       Type 2 diabetes-POCT A1c today 9.2 which is improved from last levels of 10.2. Advised to continue Metformin 1000 mg twice daily along with glipizide 10 mg twice daily. Referral placed for podiatry. Recommended getting eye exam.  On ARB and statin. Hypertension-controlled.   Continue losartan 50 mg daily    Anxiety-started on Wellbutrin 150 mg daily    Smoker-started on Wellbutrin 150 mg daily. Will get PFTs to confirm COPD. Currently on Spiriva and albuterol. Patient was counseled on tobacco cessation. Based upon patient's motivation to change his behavior, the following plan was agreed upon: patient will try the following tobacco cessation strategies:  buproprion: risks and benefits of this medication discussed- patient will call for any significant adverse effects. He was provided with a list of local tobacco cessation resources. Provider spent 5 minutes counseling patient. Return in about 3 months (around 1/11/2022) for Follow up DM/HTN. Orders Placed This Encounter   Procedures    CBC Auto Differential     Standing Status:   Future     Standing Expiration Date:   1/11/2022    Comprehensive Metabolic Panel     Standing Status:   Future     Standing Expiration Date:   1/11/2022    Lipid Panel     Standing Status:   Future     Standing Expiration Date:   1/11/2022     Order Specific Question:   Is Patient Fasting?/# of Hours     Answer:    Yes    TSH with Reflex     Standing Status:   Future     Standing Expiration Date:   1/11/2022    Magnesium     Standing Status:   Future     Standing Expiration Date:   10/11/2022   [facility] - Skyla Lazo DPM, Podiatry, [place]     Referral Priority:   Routine     Referral Type:   Eval and Treat     Referral Reason:   Specialty Services Required     Referred to Provider:   Doreen Brown DPM     Requested Specialty:   Podiatry     Number of Visits Requested:   1    POCT glycosylated hemoglobin (Hb A1C)    HM DIABETES FOOT EXAM    Full PFT Study With Bronchodilator     If an ABG is needed along with this PFT procedure, please place the appropriate lab order     Standing Status:   Future     Standing Expiration Date:   10/11/2022     Orders Placed This Encounter   Medications    buPROPion (WELLBUTRIN XL) 150 MG extended release tablet     Sig: Take 1 tablet by mouth every morning     Dispense:  30 tablet     Refill:  3

## 2021-10-19 ENCOUNTER — TELEPHONE (OUTPATIENT)
Dept: FAMILY MEDICINE CLINIC | Age: 51
End: 2021-10-19

## 2021-10-20 DIAGNOSIS — E78.1 HYPERTRIGLYCERIDEMIA: ICD-10-CM

## 2021-10-20 DIAGNOSIS — R05.9 COUGH: ICD-10-CM

## 2021-10-20 DIAGNOSIS — E11.8 TYPE 2 DIABETES MELLITUS WITH COMPLICATION, WITHOUT LONG-TERM CURRENT USE OF INSULIN (HCC): ICD-10-CM

## 2021-10-20 DIAGNOSIS — F41.9 ANXIETY: ICD-10-CM

## 2021-10-20 DIAGNOSIS — E55.9 VITAMIN D DEFICIENCY: ICD-10-CM

## 2021-10-20 DIAGNOSIS — R06.2 WHEEZING: ICD-10-CM

## 2021-10-20 DIAGNOSIS — I10 ESSENTIAL HYPERTENSION: ICD-10-CM

## 2021-10-20 DIAGNOSIS — F17.200 SMOKER: ICD-10-CM

## 2021-10-20 DIAGNOSIS — Z72.0 TOBACCO ABUSE: ICD-10-CM

## 2021-10-20 DIAGNOSIS — R06.02 SHORTNESS OF BREATH: ICD-10-CM

## 2021-10-20 RX ORDER — FENOFIBRATE 160 MG/1
TABLET ORAL
Qty: 90 TABLET | Refills: 0 | Status: SHIPPED | OUTPATIENT
Start: 2021-10-20 | End: 2021-10-21

## 2021-10-20 RX ORDER — TIOTROPIUM BROMIDE 18 UG/1
18 CAPSULE ORAL; RESPIRATORY (INHALATION) DAILY
Qty: 30 CAPSULE | Refills: 5 | Status: SHIPPED | OUTPATIENT
Start: 2021-10-20 | End: 2021-10-21

## 2021-10-20 RX ORDER — BUDESONIDE AND FORMOTEROL FUMARATE DIHYDRATE 160; 4.5 UG/1; UG/1
2 AEROSOL RESPIRATORY (INHALATION) 2 TIMES DAILY
Qty: 1 EACH | Refills: 0 | Status: SHIPPED | OUTPATIENT
Start: 2021-10-20 | End: 2022-02-07 | Stop reason: ALTCHOICE

## 2021-10-20 RX ORDER — ERGOCALCIFEROL 1.25 MG/1
50000 CAPSULE ORAL WEEKLY
Qty: 36 CAPSULE | Refills: 1 | Status: SHIPPED | OUTPATIENT
Start: 2021-10-20 | End: 2022-05-24

## 2021-10-20 RX ORDER — FAMOTIDINE 20 MG/1
TABLET, FILM COATED ORAL
Qty: 180 TABLET | Refills: 3 | Status: SHIPPED | OUTPATIENT
Start: 2021-10-20 | End: 2021-10-21 | Stop reason: SDUPTHER

## 2021-10-20 RX ORDER — GLIPIZIDE 5 MG/1
TABLET ORAL
Qty: 180 TABLET | Refills: 0 | Status: SHIPPED | OUTPATIENT
Start: 2021-10-20 | End: 2021-10-21 | Stop reason: SDUPTHER

## 2021-10-20 RX ORDER — LOSARTAN POTASSIUM 50 MG/1
50 TABLET ORAL DAILY
Qty: 30 TABLET | Refills: 2 | Status: SHIPPED | OUTPATIENT
Start: 2021-10-20 | End: 2021-10-21 | Stop reason: SDUPTHER

## 2021-10-20 RX ORDER — CHLORHEXIDINE GLUCONATE 0.12 MG/ML
RINSE ORAL
Qty: 118 ML | Refills: 1 | Status: SHIPPED | OUTPATIENT
Start: 2021-10-20

## 2021-10-20 RX ORDER — PANTOPRAZOLE SODIUM 20 MG/1
TABLET, DELAYED RELEASE ORAL
Qty: 90 TABLET | Refills: 0 | Status: SHIPPED | OUTPATIENT
Start: 2021-10-20 | End: 2021-10-21 | Stop reason: SDUPTHER

## 2021-10-20 RX ORDER — ATORVASTATIN CALCIUM 10 MG/1
TABLET, FILM COATED ORAL
Qty: 90 TABLET | Refills: 0 | Status: SHIPPED | OUTPATIENT
Start: 2021-10-20 | End: 2021-10-21

## 2021-10-20 RX ORDER — BUPROPION HYDROCHLORIDE 150 MG/1
150 TABLET ORAL EVERY MORNING
Qty: 30 TABLET | Refills: 3 | Status: SHIPPED | OUTPATIENT
Start: 2021-10-20 | End: 2021-10-21

## 2021-10-20 RX ORDER — METFORMIN HYDROCHLORIDE 500 MG/1
1000 TABLET, EXTENDED RELEASE ORAL 2 TIMES DAILY
Qty: 120 TABLET | Refills: 0 | Status: SHIPPED | OUTPATIENT
Start: 2021-10-20 | End: 2021-10-21 | Stop reason: SDUPTHER

## 2021-10-20 NOTE — TELEPHONE ENCOUNTER
----- Message from Silvio Cooks sent at 10/20/2021 12:20 PM EDT -----  Subject: Refill Request    QUESTIONS  Name of Medication? glipiZIDE (GLUCOTROL) 5 MG tablet  Patient-reported dosage and instructions? 0 pt's wife does not have bottle     How many days do you have left? 0  Preferred Pharmacy? [facility]  Pharmacy phone number (if available)? [phone]  Additional Information for Provider? Pt's wife states pt is traveling back   from [place] and he accidentally left all of his meds there. He will not have   any medication when he arrives in [place] this evening. She is trying to get   refills on all of his daily medications by this evening.   ---------------------------------------------------------------------------  --------------,  Name of Medication? atorvastatin (LIPITOR) 10 MG tablet  Patient-reported dosage and instructions? pt wife does not have the bottle  How many days do you have left? 0  Preferred Pharmacy? [facility]  Pharmacy phone number (if available)? [phone]  Additional Information for Provider? Pt's wife states pt is traveling back   from [place] and he accidentally left all of his meds there. He will not have   any medication when he arrives in [place] this evening. She is trying to get   refills on all of his daily medications by this evening.   ---------------------------------------------------------------------------  --------------  CALL BACK INFO  What is the best way for the office to contact you? OK to leave message on   voicemail  Preferred Call Back Phone Number?  [phone]

## 2021-10-21 ENCOUNTER — OFFICE VISIT (OUTPATIENT)
Dept: FAMILY MEDICINE CLINIC | Age: 51
End: 2021-10-21
Payer: COMMERCIAL

## 2021-10-21 VITALS
SYSTOLIC BLOOD PRESSURE: 140 MMHG | HEIGHT: 70 IN | DIASTOLIC BLOOD PRESSURE: 90 MMHG | WEIGHT: 272 LBS | TEMPERATURE: 97.3 F | OXYGEN SATURATION: 98 % | BODY MASS INDEX: 38.94 KG/M2 | HEART RATE: 91 BPM

## 2021-10-21 DIAGNOSIS — R79.89 ELEVATED LFTS: ICD-10-CM

## 2021-10-21 DIAGNOSIS — N18.31 STAGE 3A CHRONIC KIDNEY DISEASE (HCC): ICD-10-CM

## 2021-10-21 DIAGNOSIS — E78.1 HYPERTRIGLYCERIDEMIA: ICD-10-CM

## 2021-10-21 DIAGNOSIS — Z95.820 S/P ANGIOPLASTY WITH STENT: Primary | ICD-10-CM

## 2021-10-21 DIAGNOSIS — E11.8 TYPE 2 DIABETES MELLITUS WITH COMPLICATION, WITHOUT LONG-TERM CURRENT USE OF INSULIN (HCC): ICD-10-CM

## 2021-10-21 DIAGNOSIS — I10 ESSENTIAL HYPERTENSION: ICD-10-CM

## 2021-10-21 DIAGNOSIS — K21.9 GASTROESOPHAGEAL REFLUX DISEASE WITHOUT ESOPHAGITIS: ICD-10-CM

## 2021-10-21 DIAGNOSIS — Z72.0 TOBACCO ABUSE: ICD-10-CM

## 2021-10-21 PROCEDURE — 1111F DSCHRG MED/CURRENT MED MERGE: CPT | Performed by: STUDENT IN AN ORGANIZED HEALTH CARE EDUCATION/TRAINING PROGRAM

## 2021-10-21 PROCEDURE — 99495 TRANSJ CARE MGMT MOD F2F 14D: CPT | Performed by: STUDENT IN AN ORGANIZED HEALTH CARE EDUCATION/TRAINING PROGRAM

## 2021-10-21 RX ORDER — HYDROGEN PEROXIDE 2.65 ML/100ML
81 LIQUID ORAL; TOPICAL DAILY
Qty: 30 TABLET | Refills: 5 | Status: SHIPPED | OUTPATIENT
Start: 2021-10-21 | End: 2022-02-07 | Stop reason: SDUPTHER

## 2021-10-21 RX ORDER — HYDROGEN PEROXIDE 2.65 ML/100ML
LIQUID ORAL; TOPICAL
COMMUNITY
Start: 2021-10-21 | End: 2021-10-21 | Stop reason: SDUPTHER

## 2021-10-21 RX ORDER — PANTOPRAZOLE SODIUM 20 MG/1
TABLET, DELAYED RELEASE ORAL
Qty: 90 TABLET | Refills: 0 | Status: SHIPPED | OUTPATIENT
Start: 2021-10-21 | End: 2022-02-07 | Stop reason: SDUPTHER

## 2021-10-21 RX ORDER — CARVEDILOL 6.25 MG/1
6.25 TABLET ORAL
COMMUNITY
Start: 2021-10-19 | End: 2021-10-21 | Stop reason: SDUPTHER

## 2021-10-21 RX ORDER — METFORMIN HYDROCHLORIDE 500 MG/1
1000 TABLET, EXTENDED RELEASE ORAL 2 TIMES DAILY
Qty: 120 TABLET | Refills: 0 | Status: SHIPPED | OUTPATIENT
Start: 2021-10-21 | End: 2022-02-07 | Stop reason: SDUPTHER

## 2021-10-21 RX ORDER — ORAL SEMAGLUTIDE 7 MG/1
1 TABLET ORAL DAILY
Qty: 30 TABLET | Refills: 1 | Status: SHIPPED | OUTPATIENT
Start: 2021-10-21 | End: 2022-02-07 | Stop reason: ALTCHOICE

## 2021-10-21 RX ORDER — CLOPIDOGREL BISULFATE 75 MG/1
75 TABLET ORAL DAILY
COMMUNITY
Start: 2021-10-20 | End: 2021-10-21 | Stop reason: SDUPTHER

## 2021-10-21 RX ORDER — NITROGLYCERIN 0.4 MG/1
0.4 TABLET SUBLINGUAL
COMMUNITY
Start: 2021-10-19 | End: 2021-11-19

## 2021-10-21 RX ORDER — FAMOTIDINE 20 MG/1
TABLET, FILM COATED ORAL
Qty: 180 TABLET | Refills: 3 | Status: SHIPPED | OUTPATIENT
Start: 2021-10-21

## 2021-10-21 RX ORDER — LANCETS 30 GAUGE
EACH MISCELLANEOUS
Qty: 100 EACH | Refills: 1 | Status: SHIPPED | OUTPATIENT
Start: 2021-10-21

## 2021-10-21 RX ORDER — LOSARTAN POTASSIUM 50 MG/1
50 TABLET ORAL DAILY
Qty: 30 TABLET | Refills: 2 | Status: SHIPPED | OUTPATIENT
Start: 2021-10-21 | End: 2022-02-07 | Stop reason: SDUPTHER

## 2021-10-21 RX ORDER — CARVEDILOL 6.25 MG/1
6.25 TABLET ORAL 2 TIMES DAILY
Qty: 60 TABLET | Refills: 2 | Status: SHIPPED | OUTPATIENT
Start: 2021-10-21 | End: 2022-02-07 | Stop reason: SDUPTHER

## 2021-10-21 RX ORDER — GLUCOSAMINE HCL/CHONDROITIN SU 500-400 MG
CAPSULE ORAL
Qty: 100 STRIP | Refills: 1 | Status: SHIPPED | OUTPATIENT
Start: 2021-10-21

## 2021-10-21 RX ORDER — BLOOD-GLUCOSE METER
1 KIT MISCELLANEOUS DAILY
Qty: 1 KIT | Refills: 0 | Status: SHIPPED | OUTPATIENT
Start: 2021-10-21

## 2021-10-21 RX ORDER — CLOPIDOGREL BISULFATE 75 MG/1
75 TABLET ORAL DAILY
Qty: 30 TABLET | Refills: 3 | Status: SHIPPED | OUTPATIENT
Start: 2021-10-21 | End: 2022-02-07 | Stop reason: SDUPTHER

## 2021-10-21 RX ORDER — GLIPIZIDE 5 MG/1
TABLET ORAL
Qty: 180 TABLET | Refills: 0 | Status: SHIPPED | OUTPATIENT
Start: 2021-10-21 | End: 2021-10-22 | Stop reason: SDUPTHER

## 2021-10-21 RX ORDER — ROSUVASTATIN CALCIUM 40 MG/1
40 TABLET, COATED ORAL
COMMUNITY
Start: 2021-10-19 | End: 2021-10-21 | Stop reason: SDUPTHER

## 2021-10-21 RX ORDER — ROSUVASTATIN CALCIUM 40 MG/1
40 TABLET, COATED ORAL EVERY EVENING
Qty: 30 TABLET | Refills: 3 | Status: SHIPPED | OUTPATIENT
Start: 2021-10-21 | End: 2022-02-07 | Stop reason: SDUPTHER

## 2021-10-21 ASSESSMENT — ENCOUNTER SYMPTOMS
COUGH: 0
EYE DISCHARGE: 0
CHEST TIGHTNESS: 0
SHORTNESS OF BREATH: 0
SORE THROAT: 0
VOMITING: 0
NAUSEA: 0
DIARRHEA: 0
WHEEZING: 0
ABDOMINAL PAIN: 0
CONSTIPATION: 0

## 2021-10-21 NOTE — PROGRESS NOTES
Post-Discharge Transitional Care Management Services or Hospital Follow Up      Stan Fraga   YOB: 1970    Date of Office Visit:  10/21/2021  Date of Hospital Admission: 10/14/2021  Date of Hospital Discharge: 10/19/2021    Care management risk score Rising risk (score 2-5) and Complex Care (Scores >=6): 1     Non face to face  following discharge, date last encounter closed (first attempt may have been earlier): *No documented post hospital discharge outreach found in the last 14 days     Call initiated 2 business days of discharge: *No response recorded in the last 14 days    Patient Active Problem List   Diagnosis    Type 2 diabetes mellitus with complication, without long-term [facility]    Chest pain    Essential hypertension    Hypertriglyceridemia    Gastroesophageal reflux disease without esophagitis    Tobacco abuse    Anxiety    S/P angioplasty with stent       Allergies   Allergen Reactions    Toradol [Ketorolac Tromethamine] Shortness Of Breath    Lisinopril Itching       Medications listed as ordered at the time of discharge from hospital      Medications marked \"taking\" at this time  Outpatient Medications Marked as Taking for the 10/21/21 encounter (Office Visit) with Ferdinand Peaec MD   Medication Sig Dispense Refill    Semaglutide (RYBELSUS) 3 MG TABS Take 3 mg by mouth daily [id number] lot #   4/23 exp date 30 tablet 0    nitroGLYCERIN (NITROSTAT) 0.4 MG SL tablet Place 0.4 mg under the tongue      Semaglutide (RYBELSUS) 7 MG TABS Take 1 tablet by mouth daily 30 tablet 1    carvedilol (COREG) 6.25 MG tablet Take 1 tablet by mouth 2 times daily 60 tablet 2    famotidine (PEPCID) 20 MG tablet TAKE 1 TABLET BY MOUTH TWICE DAILY 180 tablet 3    losartan (COZAAR) 50 MG tablet Take 1 tablet by mouth daily 30 tablet 2    metFORMIN (GLUCOPHAGE-XR) 500 MG extended release tablet Take 2 tablets by mouth 2 times daily 120 tablet 0    clopidogrel (PLAVIX) 75 MG tablet Take 1 tablet by mouth daily 30 tablet 3    EQ ASPIRIN ADULT LOW DOSE 81 MG EC tablet Take 1 tablet by mouth daily 30 tablet 5    rosuvastatin (CRESTOR) 40 MG tablet Take 1 tablet by mouth every evening 30 tablet 3    pantoprazole (PROTONIX) 20 MG tablet Take 1 tablet by mouth once daily 90 tablet 0    glucose monitoring (FREESTYLE FREEDOM) kit 1 kit by Does not apply route daily 1 kit 0    blood glucose monitor strips Test 3 times a day & as needed for symptoms of irregular blood glucose. 100 strip 1    Lancets MISC Use one Lancet per blood sugar test 100 each 1    [DISCONTINUED] glipiZIDE (GLUCOTROL) 5 MG tablet Take 2 tablet by mouth twice daily 180 tablet 0    budesonide-formoterol (SYMBICORT) 160-4.5 MCG/ACT AERO Inhale 2 puffs into the lungs 2 times daily 1 each 0    chlorhexidine (PERIDEX) 0.12 % solution RINSE WITH ONE HALF OUNCE TWICE DAILY AFTER BREAKFAST AND BEFORE BEDTIME 118 mL 1    vitamin D (ERGOCALCIFEROL) 1.25 MG (11147 UT) CAPS capsule Take 1 capsule by mouth once a week 36 capsule 1        Medications patient taking as of now reconciled against medications ordered at time of hospital discharge: Yes    Chief Complaint   Patient presents with    Other     stent placement     Diabetes    Medication Check    Referral - General       He was admitted in hospital at [facility] for chest pain secondary to NSTEMI and had a cardiac cath done s/p 2 stents placed. He says that he had the chest pain 1 night and it resolved after few hours, he then left for [place] next morning. Says that when he was in [place] the day after he started having the chest pain again and was taken to local hospital.  After his initial work-up he was found to have a's NSTEMI and was airlifted to hospital in [facility]. He then got 2 stents placed 1 day apart because of his poor kidney function. His liver enzymes were also elevated.   He was discharged home on aspirin, Plavix, beta-blocker and high-dose statin and was advised to follow-up with cardiology as outpatient. Inpatient course: Discharge summary reviewed- see chart. Interval history/Current status: He says that he left all his medication in the truck in [place] and has had no medication since he has been back in town. He was also previously on atorvastatin and was started on rosuvastatin by hospital cardiologist.  He says that overall he feels that he is recovering slowly. He does have periods of anxiety and depressed mood at times specially because of sitting at home. He is anxious to get back to work. He is also quit smoking completely since his hospitalization and plans to continue doing that. He still drinks soda and is working on cutting down. Vitals:    10/21/21 1400   BP: (!) 140/90   Pulse: 91   Temp: 97.3 °F (36.3 °C)   SpO2: 98%   Weight: 272 lb (123.4 kg)   Height: 5' 10\" (1.778 m)     Body mass index is 39.03 kg/m². Wt Readings from Last 3 Encounters:   10/21/21 272 lb (123.4 kg)   10/11/21 267 lb (121.1 kg)   07/07/21 274 lb 12.8 oz (124.6 kg)     BP Readings from Last 3 Encounters:   10/21/21 (!) 140/90   10/11/21 126/84   07/07/21 138/86       Review of Systems   Constitutional: Positive for fatigue. Negative for appetite change and fever. HENT: Negative for congestion, ear pain, hearing loss and sore throat. Eyes: Negative for discharge and visual disturbance. Respiratory: Negative for cough, chest tightness, shortness of breath and wheezing. Cardiovascular: Negative for chest pain, palpitations and leg swelling. Gastrointestinal: Negative for abdominal pain, constipation, diarrhea, nausea and vomiting. Genitourinary: Negative for flank pain, frequency, hematuria and urgency. Musculoskeletal: Negative for arthralgias, gait problem, joint swelling and myalgias. Skin: Negative. Neurological: Negative for dizziness, weakness, numbness and headaches.    Psychiatric/Behavioral: Negative for tablet; Take 1 tablet by mouth every evening  Dispense: 30 tablet; Refill: 3  - [facility] - Joni Farley, , Cardiology, [address] W/ CURRENT OUTPATIENT MED LIST  - [place] DISCHARGE MEDS RECONCILED W/ CURRENT OUTPATIENT MED LIST    Recent MI s/p stents x2 on beta-blocker, aspirin, Plavix and statin. Referral placed for cardiology. 2. Type 2 diabetes mellitus with complication, without long-term current use of insulin (HCC)    - Semaglutide (RYBELSUS) 7 MG TABS; Take 1 tablet by mouth daily  Dispense: 30 tablet; Refill: 1  - metFORMIN (GLUCOPHAGE-XR) 500 MG extended release tablet; Take 2 tablets by mouth 2 times daily  Dispense: 120 tablet; Refill: 0  - glucose monitoring (FREESTYLE FREEDOM) kit; 1 kit by Does not apply route daily  Dispense: 1 kit; Refill: 0  - blood glucose monitor strips; Test 3 times a day & as needed for symptoms of irregular blood glucose. Dispense: 100 strip; Refill: 1  - Lancets MISC; Use one Lancet per blood sugar test  Dispense: 100 each; Refill: 1  - [place] DISCHARGE MEDS RECONCILED W/ CURRENT OUTPATIENT MED LIST  - [place] DISCHARGE MEDS RECONCILED W/ CURRENT OUTPATIENT MED LIST  - Semaglutide (RYBELSUS) 3 MG TABS; Take 3 mg by mouth daily [id number] lot #   4/23 exp date  Dispense: 30 tablet; Refill: 0    Last A1c-9.2. Started on Rybelsus 3 mg daily for 4 weeks and then 7 mg daily for better cardioprotection. Continue glipizide 10 mg twice daily and Metformin 1000 mg twice daily    3. Essential hypertension    - losartan (COZAAR) 50 MG tablet; Take 1 tablet by mouth daily  Dispense: 30 tablet; Refill: 2  - [place] DISCHARGE MEDS RECONCILED W/ CURRENT OUTPATIENT MED LIST  - [place] DISCHARGE MEDS RECONCILED W/ CURRENT OUTPATIENT MED LIST    4. Stage 3a chronic kidney disease (HCC)    - [place] DISCHARGE MEDS RECONCILED W/ CURRENT OUTPATIENT MED LIST    Elevated creatinine with low GFR on recent admission, previous levels normal.  We will recheck in 3 months.     5. Elevated LFTs    - [place] DISCHARGE MEDS RECONCILED W/ CURRENT OUTPATIENT MED LIST    Elevated LFTs during recent admission, will recheck in 3 months,    6. Tobacco abuse    - [place] DISCHARGE MEDS RECONCILED W/ CURRENT OUTPATIENT MED LIST    Has completely stopped smoking since hospital admission and does not plan to resume. 7. Hypertriglyceridemia    Discontinued fenofibrate 160 mg, recent lipid panel normal    8. Gastroesophageal reflux disease without esophagitis    - famotidine (PEPCID) 20 MG tablet; TAKE 1 TABLET BY MOUTH TWICE DAILY  Dispense: 180 tablet; Refill: 3  - pantoprazole (PROTONIX) 20 MG tablet; Take 1 tablet by mouth once daily  Dispense: 90 tablet;  Refill: 0        Medical Decision Making: moderate complexity

## 2021-10-22 ENCOUNTER — TELEPHONE (OUTPATIENT)
Dept: FAMILY MEDICINE CLINIC | Age: 51
End: 2021-10-22

## 2021-10-22 DIAGNOSIS — E11.8 TYPE 2 DIABETES MELLITUS WITH COMPLICATION, WITHOUT LONG-TERM CURRENT USE OF INSULIN (HCC): ICD-10-CM

## 2021-10-22 RX ORDER — GLIPIZIDE 5 MG/1
10 TABLET ORAL
Qty: 360 TABLET | Refills: 0 | Status: SHIPPED | OUTPATIENT
Start: 2021-10-22 | End: 2022-05-24 | Stop reason: SDUPTHER

## 2021-10-22 RX ORDER — ORAL SEMAGLUTIDE 3 MG/1
3 TABLET ORAL DAILY
Qty: 30 TABLET | Refills: 0 | COMMUNITY
Start: 2021-10-22 | End: 2022-02-07 | Stop reason: ALTCHOICE

## 2021-10-22 NOTE — TELEPHONE ENCOUNTER
Patient will need a new script for 90 days of glipizide 5 mg take 2 tablets by mouth twice a day total of 360 tablets .  Or you can do  120 for 30 days with refills

## 2022-02-07 ENCOUNTER — OFFICE VISIT (OUTPATIENT)
Dept: FAMILY MEDICINE CLINIC | Age: 52
End: 2022-02-07
Payer: COMMERCIAL

## 2022-02-07 VITALS
WEIGHT: 275 LBS | DIASTOLIC BLOOD PRESSURE: 82 MMHG | BODY MASS INDEX: 39.37 KG/M2 | HEART RATE: 101 BPM | SYSTOLIC BLOOD PRESSURE: 122 MMHG | HEIGHT: 70 IN | TEMPERATURE: 97 F

## 2022-02-07 DIAGNOSIS — K21.9 GASTROESOPHAGEAL REFLUX DISEASE WITHOUT ESOPHAGITIS: ICD-10-CM

## 2022-02-07 DIAGNOSIS — Z95.820 S/P ANGIOPLASTY WITH STENT: ICD-10-CM

## 2022-02-07 DIAGNOSIS — I10 ESSENTIAL HYPERTENSION: ICD-10-CM

## 2022-02-07 DIAGNOSIS — E11.8 TYPE 2 DIABETES MELLITUS WITH COMPLICATION, WITHOUT LONG-TERM CURRENT USE OF INSULIN (HCC): Primary | ICD-10-CM

## 2022-02-07 DIAGNOSIS — Z72.0 TOBACCO ABUSE: ICD-10-CM

## 2022-02-07 LAB
CREATININE URINE POCT: 300
HBA1C MFR BLD: 10.7 %
MICROALBUMIN/CREAT 24H UR: 30 MG/G{CREAT}
MICROALBUMIN/CREAT UR-RTO: <30

## 2022-02-07 PROCEDURE — 99406 BEHAV CHNG SMOKING 3-10 MIN: CPT | Performed by: STUDENT IN AN ORGANIZED HEALTH CARE EDUCATION/TRAINING PROGRAM

## 2022-02-07 PROCEDURE — 99214 OFFICE O/P EST MOD 30 MIN: CPT | Performed by: STUDENT IN AN ORGANIZED HEALTH CARE EDUCATION/TRAINING PROGRAM

## 2022-02-07 PROCEDURE — 83036 HEMOGLOBIN GLYCOSYLATED A1C: CPT | Performed by: STUDENT IN AN ORGANIZED HEALTH CARE EDUCATION/TRAINING PROGRAM

## 2022-02-07 PROCEDURE — 82044 UR ALBUMIN SEMIQUANTITATIVE: CPT | Performed by: STUDENT IN AN ORGANIZED HEALTH CARE EDUCATION/TRAINING PROGRAM

## 2022-02-07 RX ORDER — NICOTINE 21 MG/24HR
1 PATCH, TRANSDERMAL 24 HOURS TRANSDERMAL DAILY
Qty: 42 PATCH | Refills: 0 | Status: SHIPPED | OUTPATIENT
Start: 2022-02-07 | End: 2022-03-15 | Stop reason: SDUPTHER

## 2022-02-07 RX ORDER — CARVEDILOL 6.25 MG/1
6.25 TABLET ORAL 2 TIMES DAILY
Qty: 180 TABLET | Refills: 1 | Status: SHIPPED | OUTPATIENT
Start: 2022-02-07 | End: 2022-09-06

## 2022-02-07 RX ORDER — PANTOPRAZOLE SODIUM 20 MG/1
TABLET, DELAYED RELEASE ORAL
Qty: 90 TABLET | Refills: 1 | Status: SHIPPED | OUTPATIENT
Start: 2022-02-07 | End: 2022-05-20

## 2022-02-07 RX ORDER — METFORMIN HYDROCHLORIDE 500 MG/1
1000 TABLET, EXTENDED RELEASE ORAL 2 TIMES DAILY
Qty: 360 TABLET | Refills: 1 | Status: SHIPPED | OUTPATIENT
Start: 2022-02-07 | End: 2022-08-08

## 2022-02-07 RX ORDER — CLOPIDOGREL BISULFATE 75 MG/1
75 TABLET ORAL DAILY
Qty: 90 TABLET | Refills: 1 | Status: SHIPPED | OUTPATIENT
Start: 2022-02-07

## 2022-02-07 RX ORDER — HYDROGEN PEROXIDE 2.65 ML/100ML
81 LIQUID ORAL; TOPICAL DAILY
Qty: 90 TABLET | Refills: 1 | Status: SHIPPED | OUTPATIENT
Start: 2022-02-07 | End: 2022-08-08

## 2022-02-07 RX ORDER — LOSARTAN POTASSIUM 50 MG/1
50 TABLET ORAL DAILY
Qty: 90 TABLET | Refills: 1 | Status: SHIPPED | OUTPATIENT
Start: 2022-02-07 | End: 2022-04-12

## 2022-02-07 RX ORDER — ROSUVASTATIN CALCIUM 40 MG/1
40 TABLET, COATED ORAL EVERY EVENING
Qty: 90 TABLET | Refills: 1 | Status: SHIPPED | OUTPATIENT
Start: 2022-02-07 | End: 2022-05-24 | Stop reason: SDUPTHER

## 2022-02-07 ASSESSMENT — ENCOUNTER SYMPTOMS
VOMITING: 0
CONSTIPATION: 0
CHEST TIGHTNESS: 0
SHORTNESS OF BREATH: 0
WHEEZING: 0
EYE DISCHARGE: 0
COUGH: 0
NAUSEA: 0
ABDOMINAL PAIN: 0
SORE THROAT: 0
DIARRHEA: 0

## 2022-02-07 NOTE — PROGRESS NOTES
[address] PRIMARY CARE  [address] [place] [address]  Dept: [phone]  Dept Fax: [phone]    Michel Overton is a 45 y.o. male who is a Established patient, who presents today for his medical conditions/complaints as noted below:  Chief Complaint   Patient presents with    Hypertension    Diabetes    Pharyngitis     x 2 weeks     Cough     pt states from smoking          HPI:     He is here today to follow-up on diabetes and hypertension. He says that he has been taking all his medications as prescribed but he does not remember anything about Rybelsus. At his last visit he was given samples of Rybelsus 3 mg daily and prescribed 7 mg dose to start after that. He says that it has been a while and he does not remember if he was taking it. He says that he is a  and is on the road a lot and is not able to maintain good diet. States that recently he ran out of his groceries and was just eating mostly junk food and fast food. He also drank a lot of Pepsi in past few days. He says that he is really addicted to the soda. He also needs refills on all his medications. He is back to smoking about a pack a day. He has not tried nicotine patches because he was told by his cardiologist that it was still nicotine going in his body. Hemoglobin A1C (%)   Date Value   02/07/2022 10.7   10/11/2021 9.2   07/07/2021 10.2             ( goal A1Cis < 7)   Microalb/Crt.  Ratio (mcg/mg creat)   Date Value   12/28/2020 CANNOT BE CALCULATED     LDL Cholesterol (mg/dL)   Date Value   12/28/2020 81   05/29/2019 95   07/12/2018 119       (goal LDL is <100)   AST (U/L)   Date Value   12/28/2020 27     ALT (U/L)   Date Value   12/28/2020 37     BUN (mg/dL)   Date Value   12/28/2020 13     BP Readings from Last 3 Encounters:   02/07/22 122/82   10/21/21 (!) 140/90   10/11/21 126/84          (goal 120/80)    Past Medical History:   Diagnosis Date    Anxiety 10/11/2021  CAD (coronary artery disease)     Diabetes mellitus ([facility]     Essential hypertension 10/15/2018    Gastroesophageal reflux disease without esophagitis     Hypertriglyceridemia 10/15/2018    Type 2 diabetes mellitus with complication, without long-term current use of insulin ([facility] 7/11/2018      Past Surgical History:   Procedure Laterality Date    COLONOSCOPY N/A 6/25/2019    COLORECTAL CANCER SCREENING, NOT HIGH RISK performed by Anthony Meng MD at [facility] ENDOSCOPY N/A 6/25/2019    EGD BIOPSY performed by Anthony Meng MD at [id number] [place] [address].       Family History   Problem Relation Age of Onset    Diabetes Father     Cancer Maternal Grandmother     Heart Attack Paternal Grandfather     Breast Cancer Neg Hx     Colon Cancer Neg Hx     Heart Disease Neg Hx     Stroke Neg Hx     Prostate Cancer Neg Hx        Social History     Tobacco Use    Smoking status: Current Every Day Smoker     Packs/day: 1.00     Years: 0.00     Pack years: 0.00     Types: Cigarettes    Smokeless tobacco: Former User   Substance Use Topics    Alcohol use: Yes     Comment: few beers ocasionally      Current Outpatient Medications   Medication Sig Dispense Refill    clopidogrel (PLAVIX) 75 MG tablet Take 1 tablet by mouth daily 90 tablet 1    carvedilol (COREG) 6.25 MG tablet Take 1 tablet by mouth 2 times daily 180 tablet 1    rosuvastatin (CRESTOR) 40 MG tablet Take 1 tablet by mouth every evening 90 tablet 1    EQ ASPIRIN ADULT LOW DOSE 81 MG EC tablet Take 1 tablet by mouth daily 90 tablet 1    metFORMIN (GLUCOPHAGE-XR) 500 MG extended release tablet Take 2 tablets by mouth 2 times daily 360 tablet 1    Semaglutide 14 MG TABS Take 1 tablet by mouth daily 90 tablet 1    pantoprazole (PROTONIX) 20 MG tablet Take 1 tablet by mouth once daily 90 tablet 1    losartan (COZAAR) 50 MG tablet Take 1 tablet by mouth daily 90 tablet 1    dapagliflozin (FARXIGA) 5 MG tablet Take 1 tablet by mouth every morning 30 tablet 0    nicotine (NICODERM CQ) 21 MG/24HR Place 1 patch onto the skin daily 42 patch 0    glipiZIDE (GLUCOTROL) 5 MG tablet Take 2 tablets by mouth 2 times daily (before meals) Take 2 tablet by mouth twice daily 360 tablet 0    famotidine (PEPCID) 20 MG tablet TAKE 1 TABLET BY MOUTH TWICE DAILY 180 tablet 3    glucose monitoring (FREESTYLE FREEDOM) kit 1 kit by Does not apply route daily 1 kit 0    Lancets MISC Use one Lancet per blood sugar test 100 each 1    chlorhexidine (PERIDEX) 0.12 % solution RINSE WITH ONE HALF OUNCE TWICE DAILY AFTER BREAKFAST AND BEFORE BEDTIME 118 mL 1    blood glucose monitor strips Test 3 times a day & as needed for symptoms of irregular blood glucose. 100 strip 1    vitamin D (ERGOCALCIFEROL) 1.25 MG (63855 UT) CAPS capsule Take 1 capsule by mouth once a week (Patient not taking: Reported on 2/7/2022) 36 capsule 1     No current facility-administered medications for this visit.      Allergies   Allergen Reactions    Toradol [Ketorolac Tromethamine] Shortness Of Breath    Lisinopril Itching       Health Maintenance   Topic Date Due    Hepatitis C screen  Never done    COVID-19 Vaccine (1) Never done    HIV screen  Never done    Diabetic retinal exam  Never done    Lipid screen  12/28/2021    Potassium monitoring  12/28/2021    Creatinine monitoring  12/28/2021    Shingles Vaccine (2 of 2) 04/05/2022 (Originally 2/24/2021)    Flu vaccine (1) 10/11/2022 (Originally 9/1/2021)    Depression Screen  04/05/2022    A1C test (Diabetic or Prediabetic)  05/07/2022    Diabetic foot exam  10/11/2022    Diabetic microalbuminuria test  02/07/2023    Colon cancer screen colonoscopy  06/25/2029    DTaP/Tdap/Td vaccine (2 - Td or Tdap) 04/05/2031    Pneumococcal 0-64 years Vaccine (2 of 2 - PPSV23) 01/26/2033    Hepatitis B vaccine  Completed    Hepatitis A vaccine  Aged Out    Hib vaccine  Aged C/ [place] 19 Meningococcal (ACWY) vaccine  Aged Out       Subjective:     Review of Systems   Constitutional: Negative for appetite change, fatigue and fever. HENT: Negative for congestion, ear pain, hearing loss and sore throat. Eyes: Negative for discharge and visual disturbance. Respiratory: Negative for cough, chest tightness, shortness of breath and wheezing. Cardiovascular: Negative for chest pain, palpitations and leg swelling. Gastrointestinal: Negative for abdominal pain, constipation, diarrhea, nausea and vomiting. Genitourinary: Negative for flank pain, frequency, hematuria and urgency. Musculoskeletal: Negative for arthralgias, gait problem, joint swelling and myalgias. Skin: Negative. Neurological: Negative for dizziness, weakness, numbness and headaches. Psychiatric/Behavioral: Negative. Negative for dysphoric mood. The patient is not nervous/anxious. Objective:     Physical Exam  Vitals reviewed. Constitutional:       Appearance: Normal appearance. He is obese. HENT:      Head: Normocephalic and atraumatic. Nose: Nose normal.      Mouth/Throat:      Mouth: Mucous membranes are moist.      Pharynx: Oropharynx is clear. Eyes:      Extraocular Movements: Extraocular movements intact. Conjunctiva/sclera: Conjunctivae normal.      Pupils: Pupils are equal, round, and reactive to light. Cardiovascular:      Rate and Rhythm: Normal rate and regular rhythm. Heart sounds: Normal heart sounds. No murmur heard. No gallop. Pulmonary:      Effort: Pulmonary effort is normal. No respiratory distress. Breath sounds: Normal breath sounds. No stridor. No wheezing. Abdominal:      General: Bowel sounds are normal. There is no distension. Palpations: Abdomen is soft. Tenderness: There is no abdominal tenderness. There is no guarding or rebound. Musculoskeletal:         General: No swelling or tenderness. Normal range of motion.       Cervical back: Normal range of motion and neck supple. Skin:     General: Skin is warm and dry. Coloration: Skin is not jaundiced. Findings: No rash. Neurological:      General: No focal deficit present. Mental Status: He is alert and oriented to person, place, and time. Psychiatric:         Mood and Affect: Mood normal.         Behavior: Behavior normal.         Thought Content: Thought content normal.         Judgment: Judgment normal.       /82 (Site: Left Upper Arm, Position: Sitting, Cuff Size: Large Adult)   Pulse 101   Temp 97 °F (36.1 °C) (Temporal)   Ht 5' 10\" (1.778 m)   Wt 275 lb (124.7 kg)   BMI 39.46 kg/m²     Assessment/Plan:   1. Type 2 diabetes mellitus with complication, without long-term current use of insulin (HCC)  -     POCT glycosylated hemoglobin (Hb A1C)  -     metFORMIN (GLUCOPHAGE-XR) 500 MG extended release tablet; Take 2 tablets by mouth 2 times daily, Disp-360 tablet, R-1Normal  -     Semaglutide 14 MG TABS; Take 1 tablet by mouth daily, Disp-90 tablet, R-1Normal  -     dapagliflozin (FARXIGA) 5 MG tablet; Take 1 tablet by mouth every morning, Disp-30 tablet, R-0Normal  -     POCT microalbumin  2. S/P angioplasty with stent  -     clopidogrel (PLAVIX) 75 MG tablet; Take 1 tablet by mouth daily, Disp-90 tablet, R-1Normal  -     carvedilol (COREG) 6.25 MG tablet; Take 1 tablet by mouth 2 times daily, Disp-180 tablet, R-1Normal  -     rosuvastatin (CRESTOR) 40 MG tablet; Take 1 tablet by mouth every evening, Disp-90 tablet, R-1Normal  -     EQ ASPIRIN ADULT LOW DOSE 81 MG EC tablet; Take 1 tablet by mouth daily, Disp-90 tablet, R-1, DAWNormal  3. Essential hypertension  -     losartan (COZAAR) 50 MG tablet; Take 1 tablet by mouth daily, Disp-90 tablet, R-1Normal  4. Tobacco abuse  -     nicotine (NICODERM CQ) 21 MG/24HR; Place 1 patch onto the skin daily, Disp-42 patch, R-0Normal  5.  Gastroesophageal reflux disease without esophagitis  -     pantoprazole (PROTONIX) 20 MG tablet; Take 1 tablet by mouth once daily, Disp-90 tablet, R-1Normal        Type 2 diabetes-POCT A1c today 10.7. Resumed on Rybelsus 14 mg daily, continue glipizide 10 mg twice daily, Metformin 1000 mg twice daily and added Farxiga 5 mg daily. Does not want to start on any injectables yet. Discussed importance of adhering to diabetic diet to control blood sugars. Emphasized the importance of maintaining good hydration while taking Farxiga to avoid complications. He verbalized understanding and plans to increase his water intake. S/p angioplasty with stent-on Plavix, rosuvastatin 40 mg, carvedilol 6.25 mg and aspirin. Following with cardiology    Hypertension-controlled. On losartan 50 mg daily, carvedilol 6.25 mg twice daily    Patient was counseled on tobacco cessation. Based upon patient's motivation to change his behavior, the following plan was agreed upon: patient will try the following tobacco cessation strategies:  nicotine replacement: Nicotine patches, risks and benefits of this medication discussed- patient will call for any significant adverse effects. He was provided with a list of local tobacco cessation resources. Provider spent 3 minutes counseling patient. Return in about 3 months (around 5/7/2022) for Follow up DM/HTN.     Orders Placed This Encounter   Procedures    POCT glycosylated hemoglobin (Hb A1C)    POCT microalbumin     Orders Placed This Encounter   Medications    clopidogrel (PLAVIX) 75 MG tablet     Sig: Take 1 tablet by mouth daily     Dispense:  90 tablet     Refill:  1    carvedilol (COREG) 6.25 MG tablet     Sig: Take 1 tablet by mouth 2 times daily     Dispense:  180 tablet     Refill:  1    rosuvastatin (CRESTOR) 40 MG tablet     Sig: Take 1 tablet by mouth every evening     Dispense:  90 tablet     Refill:  1    EQ ASPIRIN ADULT LOW DOSE 81 MG EC tablet     Sig: Take 1 tablet by mouth daily     Dispense:  90 tablet     Refill:  1    metFORMIN (GLUCOPHAGE-XR) 500 MG extended release tablet     Sig: Take 2 tablets by mouth 2 times daily     Dispense:  360 tablet     Refill:  1    Semaglutide 14 MG TABS     Sig: Take 1 tablet by mouth daily     Dispense:  90 tablet     Refill:  1    pantoprazole (PROTONIX) 20 MG tablet     Sig: Take 1 tablet by mouth once daily     Dispense:  90 tablet     Refill:  1    losartan (COZAAR) 50 MG tablet     Sig: Take 1 tablet by mouth daily     Dispense:  90 tablet     Refill:  1    dapagliflozin (FARXIGA) 5 MG tablet     Sig: Take 1 tablet by mouth every morning     Dispense:  30 tablet     Refill:  0    nicotine (NICODERM CQ) 21 MG/24HR     Sig: Place 1 patch onto the skin daily     Dispense:  42 patch     Refill:  0       Patient given educational materials - see patient instructions. Discussed use, benefit, and side effects of prescribed medications. All patientquestions answered. Pt voiced understanding. Reviewed health maintenance. Instructedto continue current medications, diet and exercise. Patient agreed with treatmentplan. Follow up as directed.      Electronically signed by Adela Fernandez MD on 2/7/2022 at 7:10 PM

## 2022-02-08 ENCOUNTER — TELEPHONE (OUTPATIENT)
Dept: FAMILY MEDICINE CLINIC | Age: 52
End: 2022-02-08

## 2022-02-08 NOTE — TELEPHONE ENCOUNTER
Patient states that he can not afford the Semaglutide, nicotine patch, or [place] is it okay to give him sample of [place]?

## 2022-02-09 ENCOUNTER — TELEPHONE (OUTPATIENT)
Dept: FAMILY MEDICINE CLINIC | Age: 52
End: 2022-02-09

## 2022-02-09 NOTE — TELEPHONE ENCOUNTER
Left detailed message that I called in a co pay card to the pharmacy which took it down to 7.42 a month should be like thus for 12 months

## 2022-03-15 DIAGNOSIS — Z72.0 TOBACCO ABUSE: ICD-10-CM

## 2022-03-15 RX ORDER — NICOTINE 21 MG/24HR
1 PATCH, TRANSDERMAL 24 HOURS TRANSDERMAL DAILY
Qty: 42 PATCH | Refills: 0 | Status: SHIPPED | OUTPATIENT
Start: 2022-03-15 | End: 2022-09-06

## 2022-03-15 NOTE — TELEPHONE ENCOUNTER
Requested Prescriptions     Pending Prescriptions Disp Refills    nicotine (NICODERM CQ) 21 MG/24HR 42 patch 0     Sig: Place 1 patch onto the skin daily

## 2022-03-15 NOTE — TELEPHONE ENCOUNTER
He needs to continue taking the glipizide.   For Leopoldo Elbe, he can get the samples from office and we can also try to see if he can get on a medication assistance for that

## 2022-03-15 NOTE — TELEPHONE ENCOUNTER
Should patient discontinue glipizide and take Rybelsus ?  Patient would like a sample  We have 30 day samples

## 2022-04-11 DIAGNOSIS — I10 ESSENTIAL HYPERTENSION: ICD-10-CM

## 2022-04-11 DIAGNOSIS — E78.1 HYPERTRIGLYCERIDEMIA: ICD-10-CM

## 2022-04-11 DIAGNOSIS — R06.02 SHORTNESS OF BREATH: ICD-10-CM

## 2022-04-11 DIAGNOSIS — R06.2 WHEEZING: ICD-10-CM

## 2022-04-11 DIAGNOSIS — R05.9 COUGH: ICD-10-CM

## 2022-04-11 NOTE — TELEPHONE ENCOUNTER
Devi Bowers is calling to request a refill on the following medication(s):    Medication Request:  Requested Prescriptions     Pending Prescriptions Disp Refills    atorvastatin (LIPITOR) 10 MG tablet [Pharmacy Med Name: Atorvastatin Calcium 10 MG Oral Tablet] 90 tablet 0     Sig: Take 1 tablet by mouth once daily    losartan (COZAAR) 50 MG tablet [Pharmacy Med Name: Losartan Potassium 50 MG Oral Tablet] 90 tablet 0     Sig: Take 1 tablet by mouth once daily       Last Visit Date (If Applicable):  1/8/7108    Next Visit Date:    5/9/2022

## 2022-04-12 ENCOUNTER — TELEPHONE (OUTPATIENT)
Dept: FAMILY MEDICINE CLINIC | Age: 52
End: 2022-04-12

## 2022-04-12 RX ORDER — LOSARTAN POTASSIUM 50 MG/1
TABLET ORAL
Qty: 90 TABLET | Refills: 0 | Status: SHIPPED | OUTPATIENT
Start: 2022-04-12 | End: 2022-05-24 | Stop reason: SDUPTHER

## 2022-04-12 RX ORDER — ATORVASTATIN CALCIUM 10 MG/1
TABLET, FILM COATED ORAL
Qty: 90 TABLET | Refills: 0 | Status: SHIPPED | OUTPATIENT
Start: 2022-04-12 | End: 2022-05-23 | Stop reason: SDUPTHER

## 2022-05-19 DIAGNOSIS — K21.9 GASTROESOPHAGEAL REFLUX DISEASE WITHOUT ESOPHAGITIS: ICD-10-CM

## 2022-05-20 RX ORDER — PANTOPRAZOLE SODIUM 20 MG/1
TABLET, DELAYED RELEASE ORAL
Qty: 90 TABLET | Refills: 2 | Status: SHIPPED | OUTPATIENT
Start: 2022-05-20 | End: 2022-08-08

## 2022-05-20 NOTE — TELEPHONE ENCOUNTER
Pratik Goodrich is calling to request a refill on the following medication(s):    Medication Request:  Requested Prescriptions     Pending Prescriptions Disp Refills    pantoprazole (PROTONIX) 20 MG tablet [Pharmacy Med Name: Pantoprazole Sodium 20 MG Oral Tablet Delayed Release] 90 tablet 0     Sig: Take 1 tablet by mouth once daily       Last Visit Date (If Applicable):  4/4/1065    Next Visit Date:    5/24/2022

## 2022-05-23 DIAGNOSIS — R05.9 COUGH: ICD-10-CM

## 2022-05-23 DIAGNOSIS — R06.2 WHEEZING: ICD-10-CM

## 2022-05-23 DIAGNOSIS — R06.02 SHORTNESS OF BREATH: ICD-10-CM

## 2022-05-23 DIAGNOSIS — E78.1 HYPERTRIGLYCERIDEMIA: ICD-10-CM

## 2022-05-23 RX ORDER — ATORVASTATIN CALCIUM 10 MG/1
TABLET, FILM COATED ORAL
Qty: 90 TABLET | Refills: 0 | Status: SHIPPED | OUTPATIENT
Start: 2022-05-23 | End: 2022-05-24

## 2022-05-23 SDOH — ECONOMIC STABILITY: FOOD INSECURITY: WITHIN THE PAST 12 MONTHS, YOU WORRIED THAT YOUR FOOD WOULD RUN OUT BEFORE YOU GOT MONEY TO BUY MORE.: NEVER TRUE

## 2022-05-23 SDOH — ECONOMIC STABILITY: FOOD INSECURITY: WITHIN THE PAST 12 MONTHS, THE FOOD YOU BOUGHT JUST DIDN'T LAST AND YOU DIDN'T HAVE MONEY TO GET MORE.: NEVER TRUE

## 2022-05-23 ASSESSMENT — PATIENT HEALTH QUESTIONNAIRE - PHQ9
2. FEELING DOWN, DEPRESSED OR HOPELESS: 0
SUM OF ALL RESPONSES TO PHQ QUESTIONS 1-9: 0
SUM OF ALL RESPONSES TO PHQ9 QUESTIONS 1 & 2: 0
1. LITTLE INTEREST OR PLEASURE IN DOING THINGS: 0

## 2022-05-23 ASSESSMENT — SOCIAL DETERMINANTS OF HEALTH (SDOH): HOW HARD IS IT FOR YOU TO PAY FOR THE VERY BASICS LIKE FOOD, HOUSING, MEDICAL CARE, AND HEATING?: NOT HARD AT ALL

## 2022-05-23 NOTE — TELEPHONE ENCOUNTER
Herb Adams is calling to request a refill on the following medication(s):    Medication Request:  Requested Prescriptions     Pending Prescriptions Disp Refills    atorvastatin (LIPITOR) 10 MG tablet 90 tablet 0       Last Visit Date (If Applicable):  8/9/3697    Next Visit Date:    5/24/2022

## 2022-05-24 ENCOUNTER — TELEMEDICINE (OUTPATIENT)
Dept: FAMILY MEDICINE CLINIC | Age: 52
End: 2022-05-24
Payer: COMMERCIAL

## 2022-05-24 DIAGNOSIS — Z95.820 S/P ANGIOPLASTY WITH STENT: ICD-10-CM

## 2022-05-24 DIAGNOSIS — E11.8 TYPE 2 DIABETES MELLITUS WITH COMPLICATION, WITHOUT LONG-TERM CURRENT USE OF INSULIN (HCC): Primary | ICD-10-CM

## 2022-05-24 DIAGNOSIS — R04.0 FREQUENT NOSEBLEEDS: ICD-10-CM

## 2022-05-24 DIAGNOSIS — Z23 NEED FOR PROPHYLACTIC VACCINATION AND INOCULATION AGAINST VARICELLA: ICD-10-CM

## 2022-05-24 DIAGNOSIS — I10 ESSENTIAL HYPERTENSION: ICD-10-CM

## 2022-05-24 PROCEDURE — 99214 OFFICE O/P EST MOD 30 MIN: CPT | Performed by: STUDENT IN AN ORGANIZED HEALTH CARE EDUCATION/TRAINING PROGRAM

## 2022-05-24 PROCEDURE — 3046F HEMOGLOBIN A1C LEVEL >9.0%: CPT | Performed by: STUDENT IN AN ORGANIZED HEALTH CARE EDUCATION/TRAINING PROGRAM

## 2022-05-24 RX ORDER — ROSUVASTATIN CALCIUM 40 MG/1
40 TABLET, COATED ORAL EVERY EVENING
Qty: 90 TABLET | Refills: 1 | Status: SHIPPED | OUTPATIENT
Start: 2022-05-24 | End: 2022-09-06

## 2022-05-24 RX ORDER — GLIPIZIDE 5 MG/1
10 TABLET ORAL
Qty: 360 TABLET | Refills: 1 | Status: SHIPPED | OUTPATIENT
Start: 2022-05-24

## 2022-05-24 RX ORDER — LOSARTAN POTASSIUM 50 MG/1
TABLET ORAL
Qty: 90 TABLET | Refills: 0 | Status: SHIPPED | OUTPATIENT
Start: 2022-05-24

## 2022-05-24 ASSESSMENT — ENCOUNTER SYMPTOMS
SHORTNESS OF BREATH: 0
EYE DISCHARGE: 0
CONSTIPATION: 0
COUGH: 0
VOMITING: 0
SORE THROAT: 0
WHEEZING: 0
DIARRHEA: 1
NAUSEA: 0
CHEST TIGHTNESS: 0
ABDOMINAL PAIN: 1

## 2022-05-24 NOTE — PROGRESS NOTES
2022    TELEHEALTH EVALUATION -- Audio/Visual (During BGZNP-02 public health emergency)    HPI:    Landy Elena (:  1970) has requested an audio/video evaluation for the following concern(s):    He is following up on his diabetes and hypertension. He says that he has been taking his medications as prescribed and has ran out of a few of them and is requesting refills. He has been tolerating Farxiga 5 mg well, says that he notes a little dryness but he has been trying to maintain good hydration. He does not check his blood sugars at home. Says that he is a  and is traveling frequently. He is planning to leave town tomorrow and will not be back until mid July. He says that he will schedule an office appointment when he is back but he needs his medications refilled until then. He also complains of having abdominal pain and diarrhea for past 1 day, says that he might have picked up a stomach bug. His symptoms improved after he started taking the pantoprazole back again. He also complains of having occasional nosebleeds which are getting more frequent now. He says that they happen anytime without any trigger, says that last episode happened when he was sleeping at night. He is currently on Plavix due to history of CAD s/p stent placed. He agrees to follow-up with cardiology regarding continuation of Plavix and to see ENT to evaluate for any nasal pathology. Review of Systems   Constitutional: Negative for appetite change, fatigue and fever. HENT: Positive for nosebleeds. Negative for congestion, ear pain, hearing loss and sore throat. Eyes: Negative for discharge and visual disturbance. Respiratory: Negative for cough, chest tightness, shortness of breath and wheezing. Cardiovascular: Negative for chest pain, palpitations and leg swelling. Gastrointestinal: Positive for abdominal pain and diarrhea. Negative for constipation, nausea and vomiting.    Genitourinary: Negative for flank pain, frequency, hematuria and urgency. Musculoskeletal: Negative for arthralgias, gait problem, joint swelling and myalgias. Skin: Negative. Neurological: Negative for dizziness, weakness, numbness and headaches. Psychiatric/Behavioral: Negative. Negative for dysphoric mood. The patient is not nervous/anxious. Prior to Visit Medications    Medication Sig Taking? Authorizing Provider   zoster recombinant adjuvanted vaccine [facility]) 50 MCG/0.5ML SUSR injection Inject 0.5 mLs into the muscle once for 1 dose 50 MCG IM then repeat 2-6 months. Yes Clint Francois MD   glipiZIDE (GLUCOTROL) 5 mg tablet Take 2 tablets by mouth 2 times daily (before meals) Take 2 tablet by mouth twice daily Yes Clint Francois MD   dapagliflozin (FARXIGA) 10 MG tablet Take 1 tablet by mouth every morning Yes Clint Francois MD   rosuvastatin (CRESTOR) 40 MG tablet Take 1 tablet by mouth every evening Yes Clint Francois MD   losartan (COZAAR) 50 mg tablet Take 1 tablet by mouth once daily Yes Clint Francois MD   pantoprazole (PROTONIX) 20 MG tablet Take 1 tablet by mouth once daily Yes Clint Francois MD   clopidogrel (PLAVIX) 75 MG tablet Take 1 tablet by mouth daily Yes Clint Francois MD   carvedilol (COREG) 6.25 MG tablet Take 1 tablet by mouth 2 times daily Yes Clint Francois MD   EQ ASPIRIN ADULT LOW DOSE 81 MG EC tablet Take 1 tablet by mouth daily Yes Clint Francois MD   metFORMIN (GLUCOPHAGE-XR) 500 MG extended release tablet Take 2 tablets by mouth 2 times daily Yes Clint Francois MD   famotidine (PEPCID) 20 MG tablet TAKE 1 TABLET BY MOUTH TWICE DAILY Yes Clint Francois MD   glucose monitoring (FREESTYLE FREEDOM) kit 1 kit by Does not apply route daily Yes Clint Francois MD   blood glucose monitor strips Test 3 times a day & as needed for symptoms of irregular blood glucose.  Yes Clint Francois MD   Lancets MISC Use one Lancet per blood sugar test Yes Clint Francois MD   chlorhexidine (PERIDEX) 0.12 % solution RINSE WITH ONE HALF OUNCE TWICE DAILY AFTER BREAKFAST AND BEFORE BEDTIME Yes Al Macias MD   nicotine (NICODERM CQ) 21 MG/24HR Place 1 patch onto the skin daily  Patient not taking: Reported on 5/23/2022  lA Macias MD       Social History     Tobacco Use    Smoking status: Current Every Day Smoker     Packs/day: 1.00     Years: 0.00     Pack years: 0.00     Types: Cigarettes    Smokeless tobacco: Former User   Vaping Use    Vaping Use: Never used   Substance Use Topics    Alcohol use: Yes     Comment: few beers ocasionally    Drug use: Not Currently     Comment: past drug abuse, cocaine, crack, marijuana, LSD, PCP--no history of IV drug use        Allergies   Allergen Reactions    Toradol [Ketorolac Tromethamine] Shortness Of Breath    Lisinopril Itching   ,   Past Medical History:   Diagnosis Date    Anxiety 10/11/2021    CAD (coronary artery disease)     [facility]     Essential hypertension 10/15/2018    Gastroesophageal reflux disease without esophagitis     Hypertriglyceridemia 10/15/2018    Type 2 diabetes mellitus with complication, without long-term current use of insulin ([facility] 7/11/2018   ,   Past Surgical History:   Procedure Laterality Date    COLONOSCOPY N/A 6/25/2019    COLORECTAL CANCER SCREENING, NOT HIGH RISK performed by Joana Ac MD at [address] N/A 6/25/2019    EGD BIOPSY performed by Joana Ac MD at [id number] [place] [address]   ,   Social History     Tobacco Use    Smoking status: Current Every Day Smoker     Packs/day: 1.00     Years: 0.00     Pack years: 0.00     Types: Cigarettes    Smokeless tobacco: Former User   Vaping Use    Vaping Use: Never used   Substance Use Topics    Alcohol use: Yes     Comment: few beers ocasionally    Drug use: Not Currently     Comment: past drug abuse, cocaine, crack, marijuana, LSD, PCP--no history of IV drug use   ,   Family History   Problem Relation Age of Onset    Diabetes Father     Cancer Maternal Grandmother     Heart Attack Paternal Grandfather     Breast Cancer Neg Hx     Colon Cancer Neg Hx     Heart Disease Neg Hx     Stroke Neg Hx     Prostate Cancer Neg Hx    ,   Immunization History   Administered Date(s) Administered    Hepatitis B Adult (Engerix-B) 05/30/2019, 06/24/2019, 10/14/2019    Pneumococcal Polysaccharide (Eyxjockaj90) 05/30/2019    Tdap (Boostrix, Adacel) 04/05/2021    Zoster Recombinant (Shingrix) 12/30/2020, 12/30/2020   ,   Health Maintenance   Topic Date Due    COVID-19 Vaccine (1) Never done    HIV screen  Never done    Diabetic retinal exam  Never done    Pneumococcal 0-64 years Vaccine (2 - PCV) 05/30/2020    Shingles vaccine (2 of 2) 02/24/2021    A1C test (Diabetic or Prediabetic)  05/07/2022    Flu vaccine (Season Ended) 10/11/2022 (Originally 9/1/2022)    Diabetic foot exam  10/11/2022    Lipids  10/15/2022    Diabetic microalbuminuria test  02/07/2023    Depression Screen  05/23/2023    Colorectal Cancer Screen  06/25/2029    DTaP/Tdap/Td vaccine (2 - Td or Tdap) 04/05/2031    Hepatitis B vaccine  Completed    Hepatitis C screen  Completed    Hepatitis A vaccine  Aged Out    Hib vaccine  Aged Out    Meningococcal (ACWY) vaccine  Aged Out       PHYSICAL EXAMINATION:  [ INSTRUCTIONS:  \"[x]\" Indicates a positive item  \"[]\" Indicates a negative item  -- DELETE ALL ITEMS NOT EXAMINED]  Vital Signs: (As obtained by patient/caregiver or practitioner observation)    Blood pressure-  Heart rate-    Respiratory rate-    Temperature-  Pulse oximetry-     Constitutional: [x] Appears well-developed and well-nourished [x] No apparent distress      [] Abnormal-   Mental status  [x] Alert and awake  [x] Oriented to person/place/time [x]Able to follow commands      Eyes:  EOM    [x]  Normal  [] Abnormal-  Sclera  [x]  Normal  [] Abnormal -         Discharge [x]  None visible  [] Abnormal -    HENT:   [x] Normocephalic, atraumatic. [] Abnormal   [x] Mouth/Throat: Mucous membranes are moist.     External Ears [x] Normal  [] Abnormal-     Neck: [x] No visualized mass     Pulmonary/Chest: [x] Respiratory effort normal.  [x] No visualized signs of difficulty breathing or respiratory distress        [] Abnormal-      Musculoskeletal:   [] Normal gait with no signs of ataxia         [x] Normal range of motion of neck        [] Abnormal-       Neurological:        [x] No Facial Asymmetry (Cranial nerve 7 motor function) (limited exam to video visit)          [x] No gaze palsy        [] Abnormal-         Skin:        [x] No significant exanthematous lesions or discoloration noted on facial skin         [] Abnormal-            Psychiatric:       [x] Normal Affect [x] No Hallucinations        [] Abnormal-     Other pertinent observable physical exam findings-     ASSESSMENT/PLAN:  1. Type 2 diabetes mellitus with complication, without long-term current use of insulin (HCC)    - glipiZIDE (GLUCOTROL) 5 mg tablet; Take 2 tablets by mouth 2 times daily (before meals) Take 2 tablet by mouth twice daily  Dispense: 360 tablet; Refill: 1  - dapagliflozin (FARXIGA) 10 MG tablet; Take 1 tablet by mouth every morning  Dispense: 90 tablet; Refill: 1    Last A1c 10.7, increased Farxiga to 10 mg daily, continue glipizide 10 mg twice daily and metformin 1000 mg twice daily. Rybelsus was not covered through insurance. 2. Essential hypertension    - losartan (COZAAR) 50 mg tablet; Take 1 tablet by mouth once daily  Dispense: 90 tablet; Refill: 0    On carvedilol 6.25 mg twice daily and losartan 50 mg daily    3. S/P angioplasty with stent    - rosuvastatin (CRESTOR) 40 MG tablet; Take 1 tablet by mouth every evening  Dispense: 90 tablet; Refill: 1    4.  Frequent nosebleeds    - AFL - Sheryl Villa MD, Otolaryngology, [facility] - [place] to follow-up with cardiology regarding continuation of Plavix due to history of nosebleeds and referral placed for ENT to evaluate for any nasal pathology    5. Need for prophylactic vaccination and inoculation against varicella    - zoster recombinant adjuvanted vaccine [facility]) 50 MCG/0.5ML SUSR injection; Inject 0.5 mLs into the muscle once for 1 dose 50 MCG IM then repeat 2-6 months. Dispense: 1 each; Refill: 1      Return in about 2 months (around 7/24/2022) for Follow up DM/HTN. Nevilleneeru Gonzalez, was evaluated through a synchronous (real-time) audio-video encounter. The patient (or guardian if applicable) is aware that this is a billable service, which includes applicable co-pays. This Virtual Visit was conducted with patient's (and/or legal guardian's) consent. The visit was conducted pursuant to the emergency declaration under the [address] [place] [address], [address] [place] [address] [facility] authority and the [employer] General Act. Patient identification was verified, and a caregiver was present when appropriate. The patient was located at home in a state where the provider was licensed to provide care. Total time spent on this encounter: Not billed by time    --Roque Garcia MD on 5/24/2022 at 4:53 PM    An electronic signature was used to authenticate this note.

## 2022-08-08 DIAGNOSIS — E11.8 TYPE 2 DIABETES MELLITUS WITH COMPLICATION, WITHOUT LONG-TERM CURRENT USE OF INSULIN (HCC): ICD-10-CM

## 2022-08-08 DIAGNOSIS — Z95.820 S/P ANGIOPLASTY WITH STENT: ICD-10-CM

## 2022-08-08 DIAGNOSIS — K21.9 GASTROESOPHAGEAL REFLUX DISEASE WITHOUT ESOPHAGITIS: ICD-10-CM

## 2022-08-08 RX ORDER — PANTOPRAZOLE SODIUM 20 MG/1
TABLET, DELAYED RELEASE ORAL
Qty: 90 TABLET | Refills: 0 | Status: SHIPPED | OUTPATIENT
Start: 2022-08-08

## 2022-08-08 RX ORDER — METFORMIN HYDROCHLORIDE 500 MG/1
TABLET, EXTENDED RELEASE ORAL
Qty: 360 TABLET | Refills: 0 | Status: SHIPPED | OUTPATIENT
Start: 2022-08-08

## 2022-08-08 RX ORDER — HYDROGEN PEROXIDE 2.65 ML/100ML
LIQUID ORAL; TOPICAL
Qty: 90 TABLET | Refills: 0 | Status: SHIPPED | OUTPATIENT
Start: 2022-08-08

## 2022-08-08 NOTE — TELEPHONE ENCOUNTER
Requested Prescriptions     Pending Prescriptions Disp Refills    metFORMIN (GLUCOPHAGE-XR) 500 MG extended release tablet [Pharmacy Med Name: metFORMIN HCl  MG Oral Tablet Extended Release 24 Hour] 360 tablet 0     Sig: Take 2 tablets by mouth twice daily    EQ ASPIRIN ADULT LOW DOSE 81 MG EC tablet [Pharmacy Med Name: EQ Aspirin Adult Low Dose 81 MG Oral Tablet Delayed Release] 90 tablet 0     Sig: Take 1 tablet by mouth once daily    pantoprazole (PROTONIX) 20 MG tablet [Pharmacy Med Name: Pantoprazole Sodium 20 MG Oral Tablet Delayed Release] 90 tablet 0     Sig: Take 1 tablet by mouth once daily

## 2022-09-06 ENCOUNTER — TELEPHONE (OUTPATIENT)
Dept: FAMILY MEDICINE CLINIC | Age: 52
End: 2022-09-06

## 2022-09-06 ENCOUNTER — OFFICE VISIT (OUTPATIENT)
Dept: FAMILY MEDICINE CLINIC | Age: 52
End: 2022-09-06
Payer: COMMERCIAL

## 2022-09-06 VITALS
WEIGHT: 268 LBS | OXYGEN SATURATION: 97 % | TEMPERATURE: 97.3 F | BODY MASS INDEX: 38.37 KG/M2 | DIASTOLIC BLOOD PRESSURE: 84 MMHG | HEART RATE: 97 BPM | SYSTOLIC BLOOD PRESSURE: 138 MMHG | HEIGHT: 70 IN

## 2022-09-06 DIAGNOSIS — Z12.5 PROSTATE CANCER SCREENING: ICD-10-CM

## 2022-09-06 DIAGNOSIS — E53.8 B12 DEFICIENCY: ICD-10-CM

## 2022-09-06 DIAGNOSIS — E11.65 UNCONTROLLED TYPE 2 DIABETES MELLITUS WITH HYPERGLYCEMIA (HCC): Primary | ICD-10-CM

## 2022-09-06 DIAGNOSIS — Z87.891 FORMER SMOKER: ICD-10-CM

## 2022-09-06 DIAGNOSIS — I10 ESSENTIAL HYPERTENSION: ICD-10-CM

## 2022-09-06 DIAGNOSIS — Z95.820 S/P ANGIOPLASTY WITH STENT: ICD-10-CM

## 2022-09-06 PROBLEM — R07.9 CHEST PAIN: Status: RESOLVED | Noted: 2018-07-11 | Resolved: 2022-09-06

## 2022-09-06 LAB — HBA1C MFR BLD: 14 %

## 2022-09-06 PROCEDURE — 3046F HEMOGLOBIN A1C LEVEL >9.0%: CPT | Performed by: STUDENT IN AN ORGANIZED HEALTH CARE EDUCATION/TRAINING PROGRAM

## 2022-09-06 PROCEDURE — 83036 HEMOGLOBIN GLYCOSYLATED A1C: CPT | Performed by: STUDENT IN AN ORGANIZED HEALTH CARE EDUCATION/TRAINING PROGRAM

## 2022-09-06 PROCEDURE — 99214 OFFICE O/P EST MOD 30 MIN: CPT | Performed by: STUDENT IN AN ORGANIZED HEALTH CARE EDUCATION/TRAINING PROGRAM

## 2022-09-06 RX ORDER — FLASH GLUCOSE SCANNING READER
EACH MISCELLANEOUS
Qty: 1 EACH | Refills: 1 | Status: SHIPPED | OUTPATIENT
Start: 2022-09-06

## 2022-09-06 RX ORDER — FLASH GLUCOSE SENSOR
KIT MISCELLANEOUS
Qty: 1 EACH | Refills: 1 | Status: SHIPPED | OUTPATIENT
Start: 2022-09-06 | End: 2022-09-06

## 2022-09-06 RX ORDER — SEMAGLUTIDE 1.34 MG/ML
INJECTION, SOLUTION SUBCUTANEOUS
Qty: 1 ADJUSTABLE DOSE PRE-FILLED PEN SYRINGE | Refills: 0 | COMMUNITY
Start: 2022-09-06

## 2022-09-06 RX ORDER — FLASH GLUCOSE SENSOR
KIT MISCELLANEOUS
Qty: 2 EACH | Refills: 2 | Status: SHIPPED | OUTPATIENT
Start: 2022-09-06

## 2022-09-06 RX ORDER — FLASH GLUCOSE SCANNING READER
EACH MISCELLANEOUS
Qty: 1 EACH | Refills: 1 | Status: SHIPPED | OUTPATIENT
Start: 2022-09-06 | End: 2022-09-06

## 2022-09-06 ASSESSMENT — ENCOUNTER SYMPTOMS
ABDOMINAL PAIN: 0
NAUSEA: 0
EYE DISCHARGE: 0
COUGH: 0
WHEEZING: 0
SHORTNESS OF BREATH: 0
CHEST TIGHTNESS: 0
CONSTIPATION: 0
DIARRHEA: 0
SORE THROAT: 0
VOMITING: 0

## 2022-09-06 NOTE — TELEPHONE ENCOUNTER
Can you send over a new script for free style nichelle 2  reader and  2 sensors  as free style Richelle Sanders  is discontinued

## 2022-09-06 NOTE — PROGRESS NOTES
[address] PRIMARY CARE  [address] [place] [address]  Dept: [phone]  Dept Fax: [phone]    Chela Campbell is a 45 y.o. male who is a Established patient, who presents today for his medical conditions/complaints as noted below:  Chief Complaint   Patient presents with    Diabetes         HPI:     He is here today to follow-up on diabetes and hypertension. He says that has been taking all his medications but has noticed that since starting the [place] his mouth remains dry all the time. He does try to drink water but he drinks lemonade most of the time. He says that previously he was drinking Coke 2 L a day but now he is switched to lemonade about a gallon a day. He also admits to eating a lot of sweet treats including cookies and cakes. He does not check his blood sugars at home. He says that he had to go off of Trulicity last year because of the high cost.  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 He agrees to switch his diet and watch his sugars more closely. He also quit smoking about a month ago. Hemoglobin A1C (%)   Date Value   09/06/2022 14.0   02/07/2022 10.7   10/11/2021 9.2             ( goal A1Cis < 7)   Microalb/Crt.  Ratio (mcg/mg creat)   Date Value   12/28/2020 CANNOT BE CALCULATED     LDL Cholesterol (mg/dL)   Date Value   12/28/2020 81   05/29/2019 95   07/12/2018 119       (goal LDL is <100)   AST (U/L)   Date Value   12/28/2020 27     ALT (U/L)   Date Value   12/28/2020 37     BUN (mg/dL)   Date Value   12/28/2020 13     BP Readings from Last 3 Encounters:   09/06/22 138/84   02/07/22 122/82   10/21/21 (!) 140/90          (goal 120/80)    Past Medical History:   Diagnosis Date    Anxiety 10/11/2021    CAD (coronary artery disease)     Chest pain 7/11/2018    Diabetes mellitus ([address]     Essential hypertension 10/15/2018 Gastroesophageal reflux disease without esophagitis     Hypertriglyceridemia 10/15/2018    Type 2 diabetes mellitus with complication, without long-term current use of insulin ([facility] 7/11/2018      Past Surgical History:   Procedure Laterality Date    COLONOSCOPY N/A 6/25/2019    COLORECTAL CANCER SCREENING, NOT HIGH RISK performed by Kade Don MD at [facility] N/A 6/25/2019    EGD BIOPSY performed by Kade Don MD at [id number] [place] [address].       Family History   Problem Relation Age of Onset    Diabetes Father     Cancer Maternal Grandmother     Heart Attack Paternal Grandfather     Breast Cancer Neg Hx     Colon Cancer Neg Hx     Heart Disease Neg Hx     Stroke Neg Hx     Prostate Cancer Neg Hx        Social History     Tobacco Use    Smoking status: Every Day     Packs/day: 1.00     Years: 0.00     Pack years: 0.00     Types: Cigarettes    Smokeless tobacco: Former   Substance Use Topics    Alcohol use: Yes     Comment: few beers ocasionally      Current Outpatient Medications   Medication Sig Dispense Refill    Semaglutide,0.25 or 0.5MG/DOS, (OZEMPIC, 0.25 OR 0.5 MG/DOSE,) 2 MG/1.5ML SOPN Lot [id number] Exp 2/8/25 1 Adjustable Dose Pre-filled Pen Syringe 0    Continuous Blood Gluc  (FREESTYLE CHANDLER 2 READER) ANTONIO Use as directed 1 each 1    Continuous Blood Gluc Sensor (FREESTYLE CHANDLER 2 SENSOR) MISC Use as directed 2 each 2    metFORMIN (GLUCOPHAGE-XR) 500 MG extended release tablet Take 2 tablets by mouth twice daily 360 tablet 0    EQ ASPIRIN ADULT LOW DOSE 81 MG EC tablet Take 1 tablet by mouth once daily 90 tablet 0    pantoprazole (PROTONIX) 20 MG tablet Take 1 tablet by mouth once daily 90 tablet 0    glipiZIDE (GLUCOTROL) 5 mg tablet Take 2 tablets by mouth 2 times daily (before meals) Take 2 tablet by mouth twice daily 360 tablet 1    dapagliflozin (FARXIGA) 10 MG tablet Take 1 tablet by mouth every morning 90 tablet 1 rosuvastatin (CRESTOR) 40 MG tablet Take 1 tablet by mouth every evening 90 tablet 1    losartan (COZAAR) 50 mg tablet Take 1 tablet by mouth once daily 90 tablet 0    nicotine (NICODERM CQ) 21 MG/24HR Place 1 patch onto the skin daily 42 patch 0    clopidogrel (PLAVIX) 75 MG tablet Take 1 tablet by mouth daily 90 tablet 1    carvedilol (COREG) 6.25 MG tablet Take 1 tablet by mouth 2 times daily 180 tablet 1    famotidine (PEPCID) 20 MG tablet TAKE 1 TABLET BY MOUTH TWICE DAILY 180 tablet 3    glucose monitoring (FREESTYLE FREEDOM) kit 1 kit by Does not apply route daily 1 kit 0    blood glucose monitor strips Test 3 times a day & as needed for symptoms of irregular blood glucose. 100 strip 1    Lancets MISC Use one Lancet per blood sugar test 100 each 1    chlorhexidine (PERIDEX) 0.12 % solution RINSE WITH ONE HALF OUNCE TWICE DAILY AFTER BREAKFAST AND BEFORE BEDTIME 118 mL 1     No current facility-administered medications for this visit.      Allergies   Allergen Reactions    Toradol [Ketorolac Tromethamine] Shortness Of Breath    Lisinopril Itching       Health Maintenance   Topic Date Due    COVID-19 Vaccine (1) Never done    HIV screen  Never done    Diabetic retinal exam  Never done    Flu vaccine (1) 09/06/2023 (Originally 9/1/2022)    Shingles vaccine (2 of 2) 09/06/2023 (Originally 2/24/2021)    Pneumococcal 0-64 years Vaccine (2 - PCV) 09/06/2023 (Originally 5/30/2020)    Diabetic foot exam  10/11/2022    Lipids  10/15/2022    A1C test (Diabetic or Prediabetic)  12/06/2022    Diabetic microalbuminuria test  02/07/2023    Depression Screen  05/23/2023    Colorectal Cancer Screen  06/25/2029    DTaP/Tdap/Td vaccine (2 - Td or Tdap) 04/05/2031    Hepatitis B vaccine  Completed    Hepatitis C screen  Completed    Hepatitis A vaccine  Aged Out    Hib vaccine  Aged Out    Meningococcal (ACWY) vaccine  Aged Out       Subjective:     Review of Systems   Constitutional:  Negative for appetite change, fatigue and rash.   Neurological:      General: No focal deficit present. Mental Status: He is alert and oriented to person, place, and time. Psychiatric:         Mood and Affect: Mood normal.         Behavior: Behavior normal.         Thought Content: Thought content normal.         Judgment: Judgment normal.     /84   Pulse 97   Temp 97.3 °F (36.3 °C)   Ht 5' 10\" (1.778 m)   Wt 268 lb (121.6 kg)   SpO2 97%   BMI 38.45 kg/m²     Assessment/Plan:   1. Uncontrolled type 2 diabetes mellitus with hyperglycemia (HCC)  -     POCT glycosylated hemoglobin (Hb A1C)  -     Charmaine Coelho MD, Endocrinology, [facility]; Future  -     Semaglutide,0.25 or 0.5MG/DOS, (OZEMPIC, 0.25 OR 0.5 MG/DOSE,) 2 MG/1.5ML SOPN; Lot [id number] Exp 2/8/25, Disp-1 Adjustable Dose Pre-filled Pen Syringe, R-0Sample  -     Continuous Blood Gluc  (FREESTYLE CHANDLER 2 READER) ANTONIO; Use as directed, Disp-1 each, R-1Normal  -     Continuous Blood Gluc Sensor (FREESTYLE CHANDLER 2 SENSOR) MISC; Use as directed, Disp-2 each, R-2Normal  2. Essential hypertension  -     CBC with Auto Differential; Future  -     Comprehensive Metabolic Panel, Fasting; Future  -     TSH with Reflex; Future  3. S/P angioplasty with stent  -     Lipid, Fasting; Future  4. B12 deficiency  -     Vitamin B12 & Folate; Future  5. BMI 38.0-38.9,adult  -     Vitamin D 25 Hydroxy; Future  6. Prostate cancer screening  -     PSA Screening; Future  7. Former smoker      Uncontrolled diabetes-POCT A1c >14, does not want to start on insulin. Continue metformin 1000 mg twice daily, glipizide 10 mg twice daily and Farxiga 10 mg daily. Provided samples for Ozempic 0.5 mg/week. Referral placed for endocrinology. Discussed cutting down on sugary drinks and treats and increasing water intake. Hypertension-controlled, on losartan 50 mg daily and carvedilol 6.25 mg twice daily    S/p angioplasty with stent-on Plavix 75 mg daily, rosuvastatin 40 mg daily. Lipid panel ordered. History of B12 deficiency-repeat levels ordered    Former smoker-quit smoking about a month ago      Return in about 3 months (around 12/6/2022) for Follow up DM/HTN.     Orders Placed This Encounter   Procedures    CBC with Auto Differential     Standing Status:   Future     Standing Expiration Date:   3/6/2023    Comprehensive Metabolic Panel, Fasting     Standing Status:   Future     Standing Expiration Date:   3/6/2023    Lipid, Fasting     Standing Status:   Future     Standing Expiration Date:   3/6/2023    PSA Screening     Standing Status:   Future     Standing Expiration Date:   3/6/2023    Microalbumin, Ur     Standing Status:   Future     Standing Expiration Date:   12/6/2022    TSH with Reflex     Standing Status:   Future     Standing Expiration Date:   3/6/2023    Vitamin D 25 Hydroxy     Standing Status:   Future     Standing Expiration Date:   3/6/2023    Vitamin B12 & Folate     Standing Status:   Future     Standing Expiration Date:   9/6/2023    Gale Soto MD, Endocrinology, [place]     Referral Priority:   Routine     Referral Type:   Eval and Treat     Referral Reason:   Specialty Services Required     Referred to Provider:   Ana Thomas     Requested Specialty:   Endocrinology     Number of Visits Requested:   1    POCT glycosylated hemoglobin (Hb A1C)     Orders Placed This Encounter   Medications    DISCONTD: Continuous Blood Gluc Sensor (FREESTYLE CHANDLER 14 DAY SENSOR) MISC     Sig: Use as directed     Dispense:  1 each     Refill:  1    DISCONTD: Continuous Blood Gluc  (FREESTYLE CHANDLER 14 DAY READER) ANTONIO     Sig: Use as directed     Dispense:  1 each     Refill:  1    Semaglutide,0.25 or 0.5MG/DOS, (OZEMPIC, 0.25 OR 0.5 MG/DOSE,) 2 MG/1.5ML SOPN     Sig: Lot [id number] Exp 2/8/25     Dispense:  1 Adjustable Dose Pre-filled Pen Syringe     Refill:  0    Continuous Blood Gluc  (FREESTYLE CHANDLER 2 READER) ANTONIO     Sig: Use as directed Dispense:  1 each     Refill:  1    Continuous Blood Gluc Sensor (FREESTYLE CHANDLER 2 SENSOR) [facility]     Sig: Use as directed     Dispense:  2 each     Refill:  2       Patient given educational materials - see patient instructions. Discussed use, benefit, and side effects of prescribed medications. All patientquestions answered. Pt voiced understanding. Reviewed health maintenance. Instructedto continue current medications, diet and exercise. Patient agreed with treatmentplan. Follow up as directed.      Electronically signed by Saranya Yang MD on 9/7/2022 at 10:28 AM

## 2022-09-09 ENCOUNTER — HOSPITAL ENCOUNTER (OUTPATIENT)
Age: 52
Setting detail: SPECIMEN
Discharge: HOME | End: 2022-09-09

## 2022-09-09 DIAGNOSIS — E11.65 UNCONTROLLED TYPE 2 DIABETES MELLITUS WITH HYPERGLYCEMIA (HCC): ICD-10-CM

## 2022-09-09 DIAGNOSIS — I10 ESSENTIAL HYPERTENSION: ICD-10-CM

## 2022-09-09 DIAGNOSIS — Z95.820 S/P ANGIOPLASTY WITH STENT: ICD-10-CM

## 2022-09-09 DIAGNOSIS — Z12.5 PROSTATE CANCER SCREENING: ICD-10-CM

## 2022-09-09 DIAGNOSIS — E53.8 B12 DEFICIENCY: ICD-10-CM

## 2022-09-09 LAB
ABSOLUTE EOS #: 0.33 K/UL (ref 0–0.44)
ABSOLUTE IMMATURE GRANULOCYTE: 0.03 K/UL (ref 0–0.3)
ABSOLUTE LYMPH #: 2.02 K/UL (ref 1.1–3.7)
ABSOLUTE MONO #: 0.73 K/UL (ref 0.1–1.2)
ALBUMIN SERPL-MCNC: 4 G/DL (ref 3.5–5.2)
ALBUMIN/GLOBULIN RATIO: 1.3 (ref 1–2.5)
ALP BLD-CCNC: 118 U/L (ref 40–129)
ALT SERPL-CCNC: 44 U/L (ref 5–41)
ANION GAP SERPL CALCULATED.3IONS-SCNC: 13 MMOL/L (ref 9–17)
AST SERPL-CCNC: 34 U/L
BASOPHILS # BLD: 1 % (ref 0–2)
BASOPHILS ABSOLUTE: 0.06 K/UL (ref 0–0.2)
BILIRUB SERPL-MCNC: 0.5 MG/DL (ref 0.3–1.2)
BUN BLDV-MCNC: 13 MG/DL (ref 6–20)
CALCIUM SERPL-MCNC: 8.8 MG/DL (ref 8.6–10.4)
CHLORIDE BLD-SCNC: 99 MMOL/L (ref 98–107)
CHOLESTEROL, FASTING: 143 MG/DL
CHOLESTEROL/HDL RATIO: 3.6
CO2: 21 MMOL/L (ref 20–31)
CREAT SERPL-MCNC: 1.01 MG/DL (ref 0.7–1.2)
CREATININE URINE: 142.3 MG/DL (ref 39–259)
EOSINOPHILS RELATIVE PERCENT: 4 % (ref 1–4)
GFR AFRICAN AMERICAN: >60 ML/MIN
GFR NON-AFRICAN AMERICAN: >60 ML/MIN
GFR SERPL CREATININE-BSD FRML MDRD: ABNORMAL ML/MIN/{1.73_M2}
GLUCOSE FASTING: 358 MG/DL (ref 70–99)
HCT VFR BLD CALC: 39.8 % (ref 40.7–50.3)
HDLC SERPL-MCNC: 40 MG/DL
HEMOGLOBIN: 14.2 G/DL (ref 13–17)
IMMATURE GRANULOCYTES: 0 %
LDL CHOLESTEROL: 52 MG/DL (ref 0–130)
LYMPHOCYTES # BLD: 23 % (ref 24–43)
MCH RBC QN AUTO: 33.4 PG (ref 25.2–33.5)
MCHC RBC AUTO-ENTMCNC: 35.7 G/DL (ref 28.4–34.8)
MCV RBC AUTO: 93.6 FL (ref 82.6–102.9)
MICROALBUMIN/CREAT 24H UR: 69 MG/L
MICROALBUMIN/CREAT UR-RTO: 48 MCG/MG CREAT
MONOCYTES # BLD: 8 % (ref 3–12)
NRBC AUTOMATED: 0 PER 100 WBC
PDW BLD-RTO: 11.9 % (ref 11.8–14.4)
PLATELET # BLD: 195 K/UL (ref 138–453)
PMV BLD AUTO: 12.4 FL (ref 8.1–13.5)
POTASSIUM SERPL-SCNC: 4.5 MMOL/L (ref 3.7–5.3)
RBC # BLD: 4.25 M/UL (ref 4.21–5.77)
SEG NEUTROPHILS: 64 % (ref 36–65)
SEGMENTED NEUTROPHILS ABSOLUTE COUNT: 5.67 K/UL (ref 1.5–8.1)
SODIUM BLD-SCNC: 133 MMOL/L (ref 135–144)
TOTAL PROTEIN: 7.2 G/DL (ref 6.4–8.3)
TRIGLYCERIDE, FASTING: 257 MG/DL
TSH SERPL DL<=0.05 MIU/L-ACNC: 2.37 UIU/ML (ref 0.3–5)
VITAMIN D 25-HYDROXY: 16.1 NG/ML
WBC # BLD: 8.8 K/UL (ref 3.5–11.3)

## 2022-09-10 LAB
FOLATE: 6.7 NG/ML
PROSTATE SPECIFIC ANTIGEN: 0.19 NG/ML
VITAMIN B-12: 420 PG/ML (ref 232–1245)

## 2022-11-28 DIAGNOSIS — Z95.820 S/P ANGIOPLASTY WITH STENT: ICD-10-CM

## 2022-11-28 DIAGNOSIS — K21.9 GASTROESOPHAGEAL REFLUX DISEASE WITHOUT ESOPHAGITIS: ICD-10-CM

## 2022-11-28 RX ORDER — CARVEDILOL 6.25 MG/1
TABLET ORAL
Qty: 180 TABLET | Refills: 0 | Status: SHIPPED | OUTPATIENT
Start: 2022-11-28

## 2022-11-28 RX ORDER — PANTOPRAZOLE SODIUM 20 MG/1
TABLET, DELAYED RELEASE ORAL
Qty: 90 TABLET | Refills: 0 | Status: SHIPPED | OUTPATIENT
Start: 2022-11-28

## 2022-11-28 RX ORDER — FAMOTIDINE 20 MG/1
TABLET, FILM COATED ORAL
Qty: 180 TABLET | Refills: 0 | Status: SHIPPED | OUTPATIENT
Start: 2022-11-28

## 2022-11-28 NOTE — TELEPHONE ENCOUNTER
Libby Klein is calling to request a refill on the following medication(s):    Medication Request:  Requested Prescriptions     Pending Prescriptions Disp Refills    pantoprazole (PROTONIX) 20 MG tablet [Pharmacy Med Name: Pantoprazole Sodium 20 MG Oral Tablet Delayed Release] 90 tablet 0     Sig: Take 1 tablet by mouth once daily    famotidine (PEPCID) 20 MG tablet [Pharmacy Med Name: Famotidine 20 MG Oral Tablet] 180 tablet 0     Sig: Take 1 tablet by mouth twice daily    carvedilol (COREG) 6.25 MG tablet [Pharmacy Med Name: Carvedilol 6.25 MG Oral Tablet] 180 tablet 0     Sig: Take 1 tablet by mouth twice daily       Last Visit Date (If Applicable):  9/0/4125    Next Visit Date:    1/3/2023

## 2022-12-27 ENCOUNTER — TELEPHONE (OUTPATIENT)
Dept: FAMILY MEDICINE CLINIC | Age: 52
End: 2022-12-27

## 2023-01-03 ENCOUNTER — TELEPHONE (OUTPATIENT)
Dept: FAMILY MEDICINE CLINIC | Age: 53
End: 2023-01-03

## 2023-01-03 ENCOUNTER — OFFICE VISIT (OUTPATIENT)
Dept: FAMILY MEDICINE CLINIC | Age: 53
End: 2023-01-03
Payer: COMMERCIAL

## 2023-01-03 VITALS
DIASTOLIC BLOOD PRESSURE: 87 MMHG | TEMPERATURE: 97.3 F | BODY MASS INDEX: 38.73 KG/M2 | HEIGHT: 70 IN | HEART RATE: 99 BPM | SYSTOLIC BLOOD PRESSURE: 138 MMHG | WEIGHT: 270.5 LBS | OXYGEN SATURATION: 97 %

## 2023-01-03 DIAGNOSIS — I10 ESSENTIAL HYPERTENSION: ICD-10-CM

## 2023-01-03 DIAGNOSIS — Z95.820 S/P ANGIOPLASTY WITH STENT: ICD-10-CM

## 2023-01-03 DIAGNOSIS — E55.9 VITAMIN D DEFICIENCY: ICD-10-CM

## 2023-01-03 DIAGNOSIS — Z72.0 TOBACCO ABUSE: ICD-10-CM

## 2023-01-03 DIAGNOSIS — E11.65 UNCONTROLLED TYPE 2 DIABETES MELLITUS WITH HYPERGLYCEMIA (HCC): Primary | ICD-10-CM

## 2023-01-03 DIAGNOSIS — K21.9 GASTROESOPHAGEAL REFLUX DISEASE WITHOUT ESOPHAGITIS: ICD-10-CM

## 2023-01-03 LAB — HBA1C MFR BLD: 11.6 %

## 2023-01-03 PROCEDURE — 3075F SYST BP GE 130 - 139MM HG: CPT | Performed by: STUDENT IN AN ORGANIZED HEALTH CARE EDUCATION/TRAINING PROGRAM

## 2023-01-03 PROCEDURE — 3079F DIAST BP 80-89 MM HG: CPT | Performed by: STUDENT IN AN ORGANIZED HEALTH CARE EDUCATION/TRAINING PROGRAM

## 2023-01-03 PROCEDURE — 99406 BEHAV CHNG SMOKING 3-10 MIN: CPT | Performed by: STUDENT IN AN ORGANIZED HEALTH CARE EDUCATION/TRAINING PROGRAM

## 2023-01-03 PROCEDURE — 99214 OFFICE O/P EST MOD 30 MIN: CPT | Performed by: STUDENT IN AN ORGANIZED HEALTH CARE EDUCATION/TRAINING PROGRAM

## 2023-01-03 PROCEDURE — 83036 HEMOGLOBIN GLYCOSYLATED A1C: CPT | Performed by: STUDENT IN AN ORGANIZED HEALTH CARE EDUCATION/TRAINING PROGRAM

## 2023-01-03 PROCEDURE — 3046F HEMOGLOBIN A1C LEVEL >9.0%: CPT | Performed by: STUDENT IN AN ORGANIZED HEALTH CARE EDUCATION/TRAINING PROGRAM

## 2023-01-03 RX ORDER — GLIPIZIDE 5 MG/1
10 TABLET ORAL
Qty: 360 TABLET | Refills: 1 | Status: SHIPPED | OUTPATIENT
Start: 2023-01-03

## 2023-01-03 RX ORDER — HYDROGEN PEROXIDE 2.65 ML/100ML
81 LIQUID ORAL; TOPICAL DAILY
Qty: 90 TABLET | Refills: 1 | Status: SHIPPED | OUTPATIENT
Start: 2023-01-03

## 2023-01-03 RX ORDER — PANTOPRAZOLE SODIUM 20 MG/1
TABLET, DELAYED RELEASE ORAL
Qty: 90 TABLET | Refills: 1 | Status: SHIPPED | OUTPATIENT
Start: 2023-01-03

## 2023-01-03 RX ORDER — SEMAGLUTIDE 1.34 MG/ML
INJECTION, SOLUTION SUBCUTANEOUS
Qty: 1 ADJUSTABLE DOSE PRE-FILLED PEN SYRINGE | Refills: 0 | Status: CANCELLED | OUTPATIENT
Start: 2023-01-03

## 2023-01-03 RX ORDER — CARVEDILOL 6.25 MG/1
6.25 TABLET ORAL 2 TIMES DAILY
Qty: 180 TABLET | Refills: 1 | Status: SHIPPED | OUTPATIENT
Start: 2023-01-03

## 2023-01-03 RX ORDER — ROSUVASTATIN CALCIUM 40 MG/1
40 TABLET, COATED ORAL EVERY EVENING
Qty: 90 TABLET | Refills: 1 | Status: SHIPPED | OUTPATIENT
Start: 2023-01-03 | End: 2023-04-04

## 2023-01-03 RX ORDER — LANCETS 30 GAUGE
EACH MISCELLANEOUS
Qty: 100 EACH | Refills: 1 | Status: SHIPPED | OUTPATIENT
Start: 2023-01-03

## 2023-01-03 RX ORDER — CLOPIDOGREL BISULFATE 75 MG/1
75 TABLET ORAL DAILY
Qty: 90 TABLET | Refills: 1 | Status: SHIPPED | OUTPATIENT
Start: 2023-01-03

## 2023-01-03 RX ORDER — GLUCOSAMINE HCL/CHONDROITIN SU 500-400 MG
CAPSULE ORAL
Qty: 100 STRIP | Refills: 1 | Status: SHIPPED | OUTPATIENT
Start: 2023-01-03

## 2023-01-03 RX ORDER — METFORMIN HYDROCHLORIDE 500 MG/1
500 TABLET, EXTENDED RELEASE ORAL 2 TIMES DAILY
Qty: 180 TABLET | Refills: 1 | Status: SHIPPED | OUTPATIENT
Start: 2023-01-03

## 2023-01-03 RX ORDER — LOSARTAN POTASSIUM 50 MG/1
TABLET ORAL
Qty: 90 TABLET | Refills: 1 | Status: SHIPPED | OUTPATIENT
Start: 2023-01-03

## 2023-01-03 RX ORDER — FAMOTIDINE 20 MG/1
TABLET, FILM COATED ORAL
Qty: 180 TABLET | Refills: 0 | Status: SHIPPED | OUTPATIENT
Start: 2023-01-03

## 2023-01-03 RX ORDER — CHOLECALCIFEROL (VITAMIN D3) 125 MCG
1 CAPSULE ORAL DAILY
Qty: 30 TABLET | Refills: 5 | Status: SHIPPED | OUTPATIENT
Start: 2023-01-03 | End: 2023-02-02

## 2023-01-03 ASSESSMENT — ENCOUNTER SYMPTOMS
CONSTIPATION: 0
VOMITING: 0
SHORTNESS OF BREATH: 0
DIARRHEA: 0
NAUSEA: 0
EYE DISCHARGE: 0
WHEEZING: 0
ABDOMINAL PAIN: 0
SORE THROAT: 0
CHEST TIGHTNESS: 0
COUGH: 0

## 2023-01-03 ASSESSMENT — PATIENT HEALTH QUESTIONNAIRE - PHQ9
1. LITTLE INTEREST OR PLEASURE IN DOING THINGS: 0
SUM OF ALL RESPONSES TO PHQ QUESTIONS 1-9: 0
SUM OF ALL RESPONSES TO PHQ9 QUESTIONS 1 & 2: 0
SUM OF ALL RESPONSES TO PHQ QUESTIONS 1-9: 0
2. FEELING DOWN, DEPRESSED OR HOPELESS: 0

## 2023-01-03 NOTE — PROGRESS NOTES
[address] PRIMARY CARE  [address] [place] [address] [address] [place] [address]  Dept: [phone]  Dept Fax: [phone]    Rosana Rogers is a 45 y.o. male who is a Established patient, who presents today for his medical conditions/complaints as noted below:  Chief Complaint   Patient presents with    Diabetes     Elevated glucose numbers    Hypertension         HPI:     He is here today to follow-up on diabetes and hypertension. He says that he was using the CGM but noticed that his blood sugars were running above 200 and he feels that the machine has not been working for him. He says that he has been taking all his medications as prescribed. Says that both Ozempic and Rybelsus were expensive so he never started it. He says that he has gone back to drinking Coke, says that he has hard time driving long distances if he does not take anything with him. He has also been smoking and says that he has been trying to cut down to 2 to 3 cigarettes a day instead of a pack a day that he was smoking before. He needs to schedule appointment with his eye doctor for his regular eye exam.          Hemoglobin A1C (%)   Date Value   01/03/2023 11.6   09/06/2022 14.0   02/07/2022 10.7             ( goal A1Cis < 7)   Microalb/Crt.  Ratio (mcg/mg creat)   Date Value   09/09/2022 48 (H)     LDL Cholesterol (mg/dL)   Date Value   09/09/2022 52   12/28/2020 81   05/29/2019 95       (goal LDL is <100)   AST (U/L)   Date Value   09/09/2022 34     ALT (U/L)   Date Value   09/09/2022 44 (H)     BUN (mg/dL)   Date Value   09/09/2022 13     BP Readings from Last 3 Encounters:   01/03/23 138/87   09/06/22 138/84   02/07/22 122/82          (goal 120/80)    Past Medical History:   Diagnosis Date    Anxiety 10/11/2021    CAD (coronary artery disease)     Chest pain 7/11/2018    Diabetes mellitus ([address]     Essential hypertension 10/15/2018    Gastroesophageal reflux disease without esophagitis     Hypertriglyceridemia 10/15/2018    Type 2 diabetes mellitus with complication, without long-term current use of insulin ([facility] [address] 7/11/2018      Past Surgical History:   Procedure Laterality Date    COLONOSCOPY N/A 6/25/2019    COLORECTAL CANCER SCREENING, NOT HIGH RISK performed by Jesse Velazco MD at [facility] N/A 6/25/2019    EGD BIOPSY performed by Jesse Velazco MD at [id number] [place] [address].       Family History   Problem Relation Age of Onset    Diabetes Father     Cancer Maternal Grandmother     Heart Attack Paternal Grandfather     Breast Cancer Neg Hx     Colon Cancer Neg Hx     Heart Disease Neg Hx     Stroke Neg Hx     Prostate Cancer Neg Hx        Social History     Tobacco Use    Smoking status: Every Day     Packs/day: 1.00     Years: 0.00     Pack years: 0.00     Types: Cigarettes    Smokeless tobacco: Former   Substance Use Topics    Alcohol use: Yes     Comment: few beers ocasionally      Current Outpatient Medications   Medication Sig Dispense Refill    pantoprazole (PROTONIX) 20 MG tablet Take 1 tablet by mouth once daily 90 tablet 1    metFORMIN (GLUCOPHAGE-XR) 500 MG extended release tablet Take 1 tablet by mouth in the morning and at bedtime 180 tablet 1    losartan (COZAAR) 50 MG tablet Take 1 tablet by mouth once daily 90 tablet 1    Lancets MISC Use one Lancet per blood sugar test 100 each 1    famotidine (PEPCID) 20 MG tablet Take 1 tablet by mouth once daily 180 tablet 0    EQ ASPIRIN ADULT LOW DOSE 81 MG EC tablet Take 1 tablet by mouth daily 90 tablet 1    carvedilol (COREG) 6.25 MG tablet Take 1 tablet by mouth 2 times daily 180 tablet 1    blood glucose monitor strips Test 3 times a day & as needed for symptoms of irregular blood glucose.  100 strip 1    glipiZIDE (GLUCOTROL) 5 MG tablet Take 2 tablets by mouth 2 times daily (before meals) Take 2 tablet by mouth twice daily 360 tablet 1    dapagliflozin (FARXIGA) 10 MG tablet Take 1 tablet by mouth every morning 90 tablet 1    rosuvastatin (CRESTOR) 40 MG tablet Take 1 tablet by mouth every evening 90 tablet 1    clopidogrel (PLAVIX) 75 MG tablet Take 1 tablet by mouth daily 90 tablet 1    Cholecalciferol (VITAMIN D3) 50 MCG (2000 UT) TABS Take 1 tablet by mouth daily 30 tablet 5    glucose monitoring (FREESTYLE FREEDOM) kit 1 kit by Does not apply route daily 1 kit 0    chlorhexidine (PERIDEX) 0.12 % solution RINSE WITH ONE HALF OUNCE TWICE DAILY AFTER BREAKFAST AND BEFORE BEDTIME 118 mL 1    Continuous Blood Gluc  (FREESTYLE CHANDLER 2 READER) ANTONIO Use as directed (Patient not taking: Reported on 1/3/2023) 1 each 1    Continuous Blood Gluc Sensor (FREESTYLE CHANDLER 2 SENSOR) MISC Use as directed (Patient not taking: Reported on 1/3/2023) 2 each 2     No current facility-administered medications for this visit. Allergies   Allergen Reactions    Toradol [Ketorolac Tromethamine] Shortness Of Breath    Lisinopril Itching       Health Maintenance   Topic Date Due    COVID-19 Vaccine (1) Never done    HIV screen  Never done    Diabetic retinal exam  Never done    Diabetic foot exam  10/11/2022    Flu vaccine (1) 09/06/2023 (Originally 8/1/2022)    Shingles vaccine (2 of 2) 09/06/2023 (Originally 2/24/2021)    Diabetic Alb to Cr ratio (uACR) test  02/07/2023    A1C test (Diabetic or Prediabetic)  04/03/2023    Depression Screen  05/23/2023    Lipids  09/09/2023    GFR test (Diabetes, CKD 3-4, OR last GFR 15-59)  09/09/2023    Colorectal Cancer Screen  06/25/2029    DTaP/Tdap/Td vaccine (2 - Td or Tdap) 04/05/2031    Hepatitis B vaccine  Completed    Pneumococcal 0-64 years Vaccine  Completed    Hepatitis C screen  Completed    Hepatitis A vaccine  Aged Out    Hib vaccine  Aged Out    Meningococcal (ACWY) vaccine  Aged Out       Subjective:     Review of Systems   Constitutional:  Negative for appetite change, fatigue and fever. HENT:  Negative for congestion, ear pain, hearing loss and sore throat. Eyes:  Negative for discharge and visual disturbance. Respiratory:  Negative for cough, chest tightness, shortness of breath and wheezing. Cardiovascular:  Negative for chest pain, palpitations and leg swelling. Gastrointestinal:  Negative for abdominal pain, constipation, diarrhea, nausea and vomiting. Genitourinary:  Negative for flank pain, frequency, hematuria and urgency. Musculoskeletal:  Negative for arthralgias, gait problem, joint swelling and myalgias. Skin: Negative. Neurological:  Negative for dizziness, weakness, numbness and headaches. Psychiatric/Behavioral: Negative. Negative for dysphoric mood. The patient is not nervous/anxious. Objective:     Physical Exam  Vitals reviewed. Constitutional:       Appearance: Normal appearance. He is obese. HENT:      Head: Normocephalic and atraumatic. Nose: Nose normal.      Mouth/Throat:      Mouth: Mucous membranes are moist.      Pharynx: Oropharynx is clear. Eyes:      Extraocular Movements: Extraocular movements intact. Conjunctiva/sclera: Conjunctivae normal.      Pupils: Pupils are equal, round, and reactive to light. Cardiovascular:      Rate and Rhythm: Normal rate and regular rhythm. Heart sounds: Normal heart sounds. No murmur heard. No gallop. Pulmonary:      Effort: Pulmonary effort is normal. No respiratory distress. Breath sounds: Normal breath sounds. No stridor. No wheezing. Abdominal:      General: Bowel sounds are normal. There is no distension. Palpations: Abdomen is soft. Tenderness: There is no abdominal tenderness. There is no guarding or rebound. Musculoskeletal:         General: No swelling or tenderness. Normal range of motion. Cervical back: Normal range of motion and neck supple. Skin:     General: Skin is warm and dry. Coloration: Skin is not jaundiced. Findings: No rash. Neurological:      General: No focal deficit present.       Mental Status: He is alert and oriented to person, place, and time. Psychiatric:         Mood and Affect: Mood normal.         Behavior: Behavior normal.         Thought Content: Thought content normal.         Judgment: Judgment normal.     /87   Pulse 99   Temp 97.3 °F (36.3 °C)   Ht 5' 10\" (1.778 m)   Wt 270 lb 8 oz (122.7 kg)   SpO2 97%   BMI 38.81 kg/m²     Assessment/Plan:   1. Uncontrolled type 2 diabetes mellitus with hyperglycemia (HCC)  -     POCT glycosylated hemoglobin (Hb A1C)  -     metFORMIN (GLUCOPHAGE-XR) 500 MG extended release tablet; Take 1 tablet by mouth in the morning and at bedtime, Disp-180 tablet, R-1Normal  -     Lancets MISC; Disp-100 each, R-1, NormalUse one Lancet per blood sugar test  -     blood glucose monitor strips; Test 3 times a day & as needed for symptoms of irregular blood glucose., Disp-100 strip, R-1, Normal  -     glipiZIDE (GLUCOTROL) 5 MG tablet; Take 2 tablets by mouth 2 times daily (before meals) Take 2 tablet by mouth twice daily, Disp-360 tablet, R-1Normal  -     dapagliflozin (FARXIGA) 10 MG tablet; Take 1 tablet by mouth every morning, Disp-90 tablet, R-1Normal  2. Essential hypertension  -     losartan (COZAAR) 50 MG tablet; Take 1 tablet by mouth once daily, Disp-90 tablet, R-1Normal  -     carvedilol (COREG) 6.25 MG tablet; Take 1 tablet by mouth 2 times daily, Disp-180 tablet, R-1Normal  3. S/P angioplasty with stent  -     EQ ASPIRIN ADULT LOW DOSE 81 MG EC tablet; Take 1 tablet by mouth daily, Disp-90 tablet, R-1, DAWNormal  -     carvedilol (COREG) 6.25 MG tablet; Take 1 tablet by mouth 2 times daily, Disp-180 tablet, R-1Normal  -     rosuvastatin (CRESTOR) 40 MG tablet; Take 1 tablet by mouth every evening, Disp-90 tablet, R-1Normal  -     clopidogrel (PLAVIX) 75 MG tablet; Take 1 tablet by mouth daily, Disp-90 tablet, R-1Normal  4. Gastroesophageal reflux disease without esophagitis  -     pantoprazole (PROTONIX) 20 MG tablet;  Take 1 tablet by mouth once daily, Disp-90 tablet, R-1Normal  -     famotidine (PEPCID) 20 MG tablet; Take 1 tablet by mouth once daily, Disp-180 tablet, R-0Normal  5. Tobacco abuse  6. Vitamin D deficiency  -     Cholecalciferol (VITAMIN D3) 50 MCG (2000 UT) TABS; Take 1 tablet by mouth daily, Disp-30 tablet, R-5Normal      Type 2 diabetes-uncontrolled, POCT A1c today 11.6. On metformin 500 mg twice daily, Farxiga 10 mg daily and glipizide 10 mg twice daily. Not able to afford GLP-1 agonist due to high cost and does not want to start on insulin. Discussed cutting down on sugary drinks and following diabetic diet. Hypertension-controlled, on carvedilol 6.25 mg twice daily, losartan 50 mg daily. S/p angioplasty with stent-on aspirin 81 mg daily, rosuvastatin 40 mg daily and Plavix 75 mg daily    Acid reflux-taking pantoprazole 20 mg daily, advised to take Pepcid only as needed    Patient was counseled on tobacco cessation. Based upon patient's motivation to change his behavior, the following plan was agreed upon: patient will try the following tobacco cessation strategies:  gradual reduction of tobacco use: Plans to cut down slowly . He was provided with a list of local tobacco cessation resources. Provider spent 3 minutes counseling patient. Return in about 3 months (around 4/3/2023) for Follow up DM/HTN.     Orders Placed This Encounter   Procedures    POCT glycosylated hemoglobin (Hb A1C)     Orders Placed This Encounter   Medications    pantoprazole (PROTONIX) 20 MG tablet     Sig: Take 1 tablet by mouth once daily     Dispense:  90 tablet     Refill:  1    metFORMIN (GLUCOPHAGE-XR) 500 MG extended release tablet     Sig: Take 1 tablet by mouth in the morning and at bedtime     Dispense:  180 tablet     Refill:  1    losartan (COZAAR) 50 MG tablet     Sig: Take 1 tablet by mouth once daily     Dispense:  90 tablet     Refill:  1    Lancets MISC     Sig: Use one Lancet per blood sugar test     Dispense:  100 each Refill:  1    famotidine (PEPCID) 20 MG tablet     Sig: Take 1 tablet by mouth once daily     Dispense:  180 tablet     Refill:  0    EQ ASPIRIN ADULT LOW DOSE 81 MG EC tablet     Sig: Take 1 tablet by mouth daily     Dispense:  90 tablet     Refill:  1    carvedilol (COREG) 6.25 MG tablet     Sig: Take 1 tablet by mouth 2 times daily     Dispense:  180 tablet     Refill:  1    blood glucose monitor strips     Sig: Test 3 times a day & as needed for symptoms of irregular blood glucose. Dispense:  100 strip     Refill:  1    glipiZIDE (GLUCOTROL) 5 MG tablet     Sig: Take 2 tablets by mouth 2 times daily (before meals) Take 2 tablet by mouth twice daily     Dispense:  360 tablet     Refill:  1    dapagliflozin (FARXIGA) 10 MG tablet     Sig: Take 1 tablet by mouth every morning     Dispense:  90 tablet     Refill:  1    rosuvastatin (CRESTOR) 40 MG tablet     Sig: Take 1 tablet by mouth every evening     Dispense:  90 tablet     Refill:  1    clopidogrel (PLAVIX) 75 MG tablet     Sig: Take 1 tablet by mouth daily     Dispense:  90 tablet     Refill:  1    Cholecalciferol (VITAMIN D3) 50 MCG (2000 UT) TABS     Sig: Take 1 tablet by mouth daily     Dispense:  30 tablet     Refill:  5       Patient given educational materials - see patient instructions. Discussed use, benefit, and side effects of prescribed medications. All patientquestions answered. Pt voiced understanding. Reviewed health maintenance. Instructedto continue current medications, diet and exercise. Patient agreed with treatmentplan. Follow up as directed.      Electronically signed by Trish Mac MD on 1/3/2023 at 10:53 AM

## 2023-01-03 NOTE — TELEPHONE ENCOUNTER
Pt wife states that old pcp used to give him jardiance 10mg samples and is wondering if they can get them from us as well since she says at [address] it is too expensive. She also want to know if we have glucose monitoring supplies as samples as well.

## 2023-01-04 DIAGNOSIS — E11.65 UNCONTROLLED TYPE 2 DIABETES MELLITUS WITH HYPERGLYCEMIA (HCC): Primary | ICD-10-CM

## 2023-01-04 DIAGNOSIS — E11.65 UNCONTROLLED TYPE 2 DIABETES MELLITUS WITH HYPERGLYCEMIA (HCC): ICD-10-CM

## 2023-01-05 ENCOUNTER — TELEPHONE (OUTPATIENT)
Dept: FAMILY MEDICINE CLINIC | Age: 53
End: 2023-01-05

## 2023-01-05 RX ORDER — FLASH GLUCOSE SENSOR
KIT MISCELLANEOUS
Qty: 2 EACH | Refills: 0 | Status: SHIPPED | OUTPATIENT
Start: 2023-01-05

## 2023-01-05 NOTE — TELEPHONE ENCOUNTER
Jazzy Sherif is calling to request a refill on the following medication(s):    Medication Request:  Requested Prescriptions     Pending Prescriptions Disp Refills    Continuous Blood Gluc Sensor (FREESTYLE CHANDLER 2 SENSOR) MISC [Pharmacy Med Name: FREESTYLE CHANDLER 2 SENSOR KIT] 2 each 0     Sig: USE AS DIRECTED CHANGING  EVERY  14  DAYS       Last Visit Date (If Applicable):  7/0/0954    Next Visit Date:    5/1/2023

## 2023-01-05 NOTE — TELEPHONE ENCOUNTER
Pharmacy called and stated they gave patient a emergency refill for the freestyle nichelle sensor, since he is a .  So the one sent in today will be put on hold

## 2023-02-07 DIAGNOSIS — E11.65 UNCONTROLLED TYPE 2 DIABETES MELLITUS WITH HYPERGLYCEMIA (HCC): ICD-10-CM

## 2023-02-07 LAB
AVERAGE GLUCOSE: NORMAL
HBA1C MFR BLD: 9.3 %

## 2023-02-07 RX ORDER — FLASH GLUCOSE SENSOR
KIT MISCELLANEOUS
Qty: 2 EACH | Refills: 0 | Status: SHIPPED | OUTPATIENT
Start: 2023-02-07

## 2023-02-07 NOTE — TELEPHONE ENCOUNTER
Osei Gallegos is calling to request a refill on the following medication(s):    Medication Request:  Requested Prescriptions     Pending Prescriptions Disp Refills    Continuous Blood Gluc Sensor (FREESTYLE CHANDLER 2 SENSOR) MISC 2 each 0     Sig: USE AS DIRECTED CHANGING  EVERY  14  DAYS       Last Visit Date (If Applicable):  8/2/3509    Next Visit Date:    5/1/2023

## 2023-03-11 SDOH — ECONOMIC STABILITY: HOUSING INSECURITY
IN THE LAST 12 MONTHS, WAS THERE A TIME WHEN YOU DID NOT HAVE A STEADY PLACE TO SLEEP OR SLEPT IN A SHELTER (INCLUDING NOW)?: NO

## 2023-03-11 SDOH — ECONOMIC STABILITY: INCOME INSECURITY: HOW HARD IS IT FOR YOU TO PAY FOR THE VERY BASICS LIKE FOOD, HOUSING, MEDICAL CARE, AND HEATING?: NOT VERY HARD

## 2023-03-11 SDOH — ECONOMIC STABILITY: FOOD INSECURITY: WITHIN THE PAST 12 MONTHS, THE FOOD YOU BOUGHT JUST DIDN'T LAST AND YOU DIDN'T HAVE MONEY TO GET MORE.: NEVER TRUE

## 2023-03-11 SDOH — ECONOMIC STABILITY: FOOD INSECURITY: WITHIN THE PAST 12 MONTHS, YOU WORRIED THAT YOUR FOOD WOULD RUN OUT BEFORE YOU GOT MONEY TO BUY MORE.: NEVER TRUE

## 2023-03-13 ENCOUNTER — OFFICE VISIT (OUTPATIENT)
Dept: FAMILY MEDICINE CLINIC | Age: 53
End: 2023-03-13

## 2023-03-13 VITALS
OXYGEN SATURATION: 98 % | TEMPERATURE: 97.4 F | WEIGHT: 257.8 LBS | DIASTOLIC BLOOD PRESSURE: 70 MMHG | BODY MASS INDEX: 36.91 KG/M2 | HEIGHT: 70 IN | HEART RATE: 103 BPM | SYSTOLIC BLOOD PRESSURE: 126 MMHG

## 2023-03-13 DIAGNOSIS — Z72.0 TOBACCO ABUSE: ICD-10-CM

## 2023-03-13 DIAGNOSIS — E11.65 UNCONTROLLED TYPE 2 DIABETES MELLITUS WITH HYPERGLYCEMIA (HCC): ICD-10-CM

## 2023-03-13 DIAGNOSIS — F32.1 CURRENT MODERATE EPISODE OF MAJOR DEPRESSIVE DISORDER WITHOUT PRIOR EPISODE (HCC): Primary | ICD-10-CM

## 2023-03-13 RX ORDER — FLASH GLUCOSE SENSOR
KIT MISCELLANEOUS
Qty: 2 EACH | Refills: 0 | Status: SHIPPED | OUTPATIENT
Start: 2023-03-13

## 2023-03-13 RX ORDER — SERTRALINE HYDROCHLORIDE 25 MG/1
25 TABLET, FILM COATED ORAL DAILY
Qty: 30 TABLET | Refills: 1 | Status: SHIPPED | OUTPATIENT
Start: 2023-03-13

## 2023-03-13 RX ORDER — HYDROXYZINE HYDROCHLORIDE 25 MG/1
25 TABLET, FILM COATED ORAL EVERY 8 HOURS PRN
Qty: 30 TABLET | Refills: 1 | Status: SHIPPED | OUTPATIENT
Start: 2023-03-13 | End: 2023-03-23

## 2023-03-13 SDOH — ECONOMIC STABILITY: INCOME INSECURITY: IN THE LAST 12 MONTHS, WAS THERE A TIME WHEN YOU WERE NOT ABLE TO PAY THE MORTGAGE OR RENT ON TIME?: NO

## 2023-03-13 SDOH — ECONOMIC STABILITY: FOOD INSECURITY: WITHIN THE PAST 12 MONTHS, YOU WORRIED THAT YOUR FOOD WOULD RUN OUT BEFORE YOU GOT MONEY TO BUY MORE.: NEVER TRUE

## 2023-03-13 SDOH — ECONOMIC STABILITY: FOOD INSECURITY: WITHIN THE PAST 12 MONTHS, THE FOOD YOU BOUGHT JUST DIDN'T LAST AND YOU DIDN'T HAVE MONEY TO GET MORE.: NEVER TRUE

## 2023-03-13 ASSESSMENT — ENCOUNTER SYMPTOMS
ABDOMINAL PAIN: 0
NAUSEA: 0
COUGH: 0
CHEST TIGHTNESS: 0
CONSTIPATION: 0
VOMITING: 0
EYE DISCHARGE: 0
WHEEZING: 0
SHORTNESS OF BREATH: 0
DIARRHEA: 0
SORE THROAT: 0

## 2023-03-13 ASSESSMENT — PATIENT HEALTH QUESTIONNAIRE - PHQ9
10. IF YOU CHECKED OFF ANY PROBLEMS, HOW DIFFICULT HAVE THESE PROBLEMS MADE IT FOR YOU TO DO YOUR WORK, TAKE CARE OF THINGS AT HOME, OR GET ALONG WITH OTHER PEOPLE: 2
SUM OF ALL RESPONSES TO PHQ QUESTIONS 1-9: 9
9. THOUGHTS THAT YOU WOULD BE BETTER OFF DEAD, OR OF HURTING YOURSELF: 2
6. FEELING BAD ABOUT YOURSELF - OR THAT YOU ARE A FAILURE OR HAVE LET YOURSELF OR YOUR FAMILY DOWN: 0
SUM OF ALL RESPONSES TO PHQ QUESTIONS 1-9: 11
SUM OF ALL RESPONSES TO PHQ QUESTIONS 1-9: 11
3. TROUBLE FALLING OR STAYING ASLEEP: 0
4. FEELING TIRED OR HAVING LITTLE ENERGY: 0
SUM OF ALL RESPONSES TO PHQ QUESTIONS 1-9: 11
7. TROUBLE CONCENTRATING ON THINGS, SUCH AS READING THE NEWSPAPER OR WATCHING TELEVISION: 3
1. LITTLE INTEREST OR PLEASURE IN DOING THINGS: 3
5. POOR APPETITE OR OVEREATING: 0
8. MOVING OR SPEAKING SO SLOWLY THAT OTHER PEOPLE COULD HAVE NOTICED. OR THE OPPOSITE, BEING SO FIGETY OR RESTLESS THAT YOU HAVE BEEN MOVING AROUND A LOT MORE THAN USUAL: 3

## 2023-03-13 ASSESSMENT — COLUMBIA-SUICIDE SEVERITY RATING SCALE - C-SSRS
6. HAVE YOU EVER DONE ANYTHING, STARTED TO DO ANYTHING, OR PREPARED TO DO ANYTHING TO END YOUR LIFE?: NO
2. HAVE YOU ACTUALLY HAD ANY THOUGHTS OF KILLING YOURSELF?: NO
1. WITHIN THE PAST MONTH, HAVE YOU WISHED YOU WERE DEAD OR WISHED YOU COULD GO TO SLEEP AND NOT WAKE UP?: YES

## 2023-03-13 ASSESSMENT — SOCIAL DETERMINANTS OF HEALTH (SDOH): HOW HARD IS IT FOR YOU TO PAY FOR THE VERY BASICS LIKE FOOD, HOUSING, MEDICAL CARE, AND HEATING?: NOT HARD AT ALL
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
## 2023-05-01 ENCOUNTER — OFFICE VISIT (OUTPATIENT)
Dept: FAMILY MEDICINE CLINIC | Age: 53
End: 2023-05-01
Payer: COMMERCIAL

## 2023-05-01 VITALS
DIASTOLIC BLOOD PRESSURE: 70 MMHG | SYSTOLIC BLOOD PRESSURE: 116 MMHG | OXYGEN SATURATION: 98 % | HEART RATE: 97 BPM | WEIGHT: 251.8 LBS | BODY MASS INDEX: 36.05 KG/M2 | TEMPERATURE: 97 F | HEIGHT: 70 IN

## 2023-05-01 DIAGNOSIS — I21.4 NSTEMI (NON-ST ELEVATED MYOCARDIAL INFARCTION) (HCC): ICD-10-CM

## 2023-05-01 DIAGNOSIS — F32.1 CURRENT MODERATE EPISODE OF MAJOR DEPRESSIVE DISORDER WITHOUT PRIOR EPISODE (HCC): ICD-10-CM

## 2023-05-01 DIAGNOSIS — E53.8 B12 DEFICIENCY: ICD-10-CM

## 2023-05-01 DIAGNOSIS — Z12.5 PROSTATE CANCER SCREENING: ICD-10-CM

## 2023-05-01 DIAGNOSIS — E55.9 VITAMIN D DEFICIENCY: ICD-10-CM

## 2023-05-01 DIAGNOSIS — I10 ESSENTIAL HYPERTENSION: ICD-10-CM

## 2023-05-01 DIAGNOSIS — E11.8 TYPE 2 DIABETES MELLITUS WITH COMPLICATION, WITHOUT LONG-TERM CURRENT USE OF INSULIN (HCC): Primary | ICD-10-CM

## 2023-05-01 DIAGNOSIS — Z72.0 TOBACCO ABUSE: ICD-10-CM

## 2023-05-01 PROCEDURE — 99406 BEHAV CHNG SMOKING 3-10 MIN: CPT | Performed by: STUDENT IN AN ORGANIZED HEALTH CARE EDUCATION/TRAINING PROGRAM

## 2023-05-01 PROCEDURE — 99214 OFFICE O/P EST MOD 30 MIN: CPT | Performed by: STUDENT IN AN ORGANIZED HEALTH CARE EDUCATION/TRAINING PROGRAM

## 2023-05-01 PROCEDURE — 3074F SYST BP LT 130 MM HG: CPT | Performed by: STUDENT IN AN ORGANIZED HEALTH CARE EDUCATION/TRAINING PROGRAM

## 2023-05-01 PROCEDURE — 3046F HEMOGLOBIN A1C LEVEL >9.0%: CPT | Performed by: STUDENT IN AN ORGANIZED HEALTH CARE EDUCATION/TRAINING PROGRAM

## 2023-05-01 PROCEDURE — 3078F DIAST BP <80 MM HG: CPT | Performed by: STUDENT IN AN ORGANIZED HEALTH CARE EDUCATION/TRAINING PROGRAM

## 2023-05-01 RX ORDER — NICOTINE 21 MG/24HR
1 PATCH, TRANSDERMAL 24 HOURS TRANSDERMAL DAILY
Qty: 42 PATCH | Refills: 0 | Status: SHIPPED | OUTPATIENT
Start: 2023-05-01 | End: 2023-06-12

## 2023-05-01 RX ORDER — GLIPIZIDE 10 MG/1
10 TABLET ORAL 2 TIMES DAILY
Qty: 180 TABLET | Refills: 1 | Status: SHIPPED | OUTPATIENT
Start: 2023-05-01

## 2023-05-01 SDOH — ECONOMIC STABILITY: FOOD INSECURITY: WITHIN THE PAST 12 MONTHS, THE FOOD YOU BOUGHT JUST DIDN'T LAST AND YOU DIDN'T HAVE MONEY TO GET MORE.: NEVER TRUE

## 2023-05-01 SDOH — ECONOMIC STABILITY: INCOME INSECURITY: IN THE LAST 12 MONTHS, WAS THERE A TIME WHEN YOU WERE NOT ABLE TO PAY THE MORTGAGE OR RENT ON TIME?: NO

## 2023-05-01 SDOH — ECONOMIC STABILITY: FOOD INSECURITY: WITHIN THE PAST 12 MONTHS, YOU WORRIED THAT YOUR FOOD WOULD RUN OUT BEFORE YOU GOT MONEY TO BUY MORE.: NEVER TRUE

## 2023-05-01 ASSESSMENT — ENCOUNTER SYMPTOMS
EYE DISCHARGE: 0
WHEEZING: 0
NAUSEA: 0
CONSTIPATION: 0
COUGH: 0
SHORTNESS OF BREATH: 0
VOMITING: 0
CHEST TIGHTNESS: 0
DIARRHEA: 0
ABDOMINAL PAIN: 0
SORE THROAT: 0

## 2023-05-01 ASSESSMENT — PATIENT HEALTH QUESTIONNAIRE - PHQ9
10. IF YOU CHECKED OFF ANY PROBLEMS, HOW DIFFICULT HAVE THESE PROBLEMS MADE IT FOR YOU TO DO YOUR WORK, TAKE CARE OF THINGS AT HOME, OR GET ALONG WITH OTHER PEOPLE: 0
3. TROUBLE FALLING OR STAYING ASLEEP: 0
9. THOUGHTS THAT YOU WOULD BE BETTER OFF DEAD, OR OF HURTING YOURSELF: 0
SUM OF ALL RESPONSES TO PHQ QUESTIONS 1-9: 0
SUM OF ALL RESPONSES TO PHQ QUESTIONS 1-9: 0
2. FEELING DOWN, DEPRESSED OR HOPELESS: 0
5. POOR APPETITE OR OVEREATING: 0
4. FEELING TIRED OR HAVING LITTLE ENERGY: 0
8. MOVING OR SPEAKING SO SLOWLY THAT OTHER PEOPLE COULD HAVE NOTICED. OR THE OPPOSITE, BEING SO FIGETY OR RESTLESS THAT YOU HAVE BEEN MOVING AROUND A LOT MORE THAN USUAL: 0
SUM OF ALL RESPONSES TO PHQ9 QUESTIONS 1 & 2: 0
SUM OF ALL RESPONSES TO PHQ QUESTIONS 1-9: 0
SUM OF ALL RESPONSES TO PHQ QUESTIONS 1-9: 0
1. LITTLE INTEREST OR PLEASURE IN DOING THINGS: 0
6. FEELING BAD ABOUT YOURSELF - OR THAT YOU ARE A FAILURE OR HAVE LET YOURSELF OR YOUR FAMILY DOWN: 0
7. TROUBLE CONCENTRATING ON THINGS, SUCH AS READING THE NEWSPAPER OR WATCHING TELEVISION: 0

## 2023-05-01 ASSESSMENT — SOCIAL DETERMINANTS OF HEALTH (SDOH): HOW HARD IS IT FOR YOU TO PAY FOR THE VERY BASICS LIKE FOOD, HOUSING, MEDICAL CARE, AND HEATING?: NOT HARD AT ALL

## 2023-05-01 NOTE — PROGRESS NOTES
Refill:  0       Patient given educational materials - see patient instructions. Discussed use, benefit, and side effects of prescribed medications. All patientquestions answered. Pt voiced understanding. Reviewed health maintenance. Instructedto continue current medications, diet and exercise. Patient agreed with treatmentplan. Follow up as directed.      Electronically signed by Kenzie Plascencia MD on 5/1/2023 at 6:40 PM

## 2023-05-08 DIAGNOSIS — E11.8 TYPE 2 DIABETES MELLITUS WITH COMPLICATION, WITHOUT LONG-TERM CURRENT USE OF INSULIN (HCC): ICD-10-CM

## 2023-05-08 LAB
AVERAGE GLUCOSE: NORMAL
HBA1C MFR BLD: 9.1 %

## 2023-07-25 DIAGNOSIS — Z95.820 S/P ANGIOPLASTY WITH STENT: ICD-10-CM

## 2023-07-25 DIAGNOSIS — K21.9 GASTROESOPHAGEAL REFLUX DISEASE WITHOUT ESOPHAGITIS: ICD-10-CM

## 2023-07-25 DIAGNOSIS — E11.8 TYPE 2 DIABETES MELLITUS WITH COMPLICATION, WITHOUT LONG-TERM CURRENT USE OF INSULIN (HCC): ICD-10-CM

## 2023-07-25 NOTE — TELEPHONE ENCOUNTER
Ashley Levi is calling to request a refill on the following medication(s):    Medication Request:  Requested Prescriptions     Pending Prescriptions Disp Refills    Continuous Blood Gluc Sensor (FREESTYLE CHANDLER 2 SENSOR) MISC [Pharmacy Med Name: Eun Joyer 2 SENSOR KIT] 2 each 0     Sig: USE AS DIRECTED CHANGING  EVERY  14  DAYS       Last Visit Date (If Applicable):  3/2/9800    Next Visit Date:    11/6/2023

## 2023-07-25 NOTE — TELEPHONE ENCOUNTER
Ashley Levi is calling to request a refill on the following medication(s):    Medication Request:  Requested Prescriptions     Pending Prescriptions Disp Refills    famotidine (PEPCID) 20 MG tablet [Pharmacy Med Name: Famotidine 20 MG Oral Tablet] 180 tablet 0     Sig: Take 1 tablet by mouth twice daily as needed    EQ ASPIRIN ADULT LOW DOSE 81 MG EC tablet [Pharmacy Med Name: EQ Aspirin Adult Low Dose 81 MG Oral Tablet Delayed Release] 90 tablet 0     Sig: Take 1 tablet by mouth once daily    clopidogrel (PLAVIX) 75 MG tablet [Pharmacy Med Name: Clopidogrel Bisulfate 75 MG Oral Tablet] 90 tablet 0     Sig: Take 1 tablet by mouth once daily       Last Visit Date (If Applicable):  7/5/3996    Next Visit Date:    11/6/2023

## 2023-07-26 RX ORDER — FAMOTIDINE 20 MG/1
TABLET, FILM COATED ORAL
Qty: 180 TABLET | Refills: 1 | Status: SHIPPED | OUTPATIENT
Start: 2023-07-26

## 2023-07-26 RX ORDER — HYDROGEN PEROXIDE 2.65 ML/100ML
LIQUID ORAL; TOPICAL
Qty: 90 TABLET | Refills: 1 | Status: SHIPPED | OUTPATIENT
Start: 2023-07-26

## 2023-07-26 RX ORDER — CLOPIDOGREL BISULFATE 75 MG/1
TABLET ORAL
Qty: 90 TABLET | Refills: 1 | Status: SHIPPED | OUTPATIENT
Start: 2023-07-26

## 2023-07-31 LAB
AVERAGE GLUCOSE: NORMAL
HBA1C MFR BLD: 7.6 %

## 2023-09-05 DIAGNOSIS — E11.8 TYPE 2 DIABETES MELLITUS WITH COMPLICATION, WITHOUT LONG-TERM CURRENT USE OF INSULIN (HCC): ICD-10-CM

## 2023-09-05 DIAGNOSIS — E55.9 VITAMIN D DEFICIENCY: ICD-10-CM

## 2023-09-05 RX ORDER — CHOLECALCIFEROL (VITAMIN D3) 125 MCG
CAPSULE ORAL
Qty: 30 TABLET | Refills: 5 | Status: SHIPPED | OUTPATIENT
Start: 2023-09-05

## 2023-09-05 NOTE — TELEPHONE ENCOUNTER
Kendra Guzman is calling to request a refill on the following medication(s):    Medication Request:  Requested Prescriptions     Pending Prescriptions Disp Refills    Cholecalciferol (VITAMIN D3) 50 MCG (2000 UT) TABS [Pharmacy Med Name: NM VIT D3 (CASANDRA) 2000IU TAB] 30 tablet 0     Sig: Take 1 tablet by mouth once daily       Last Visit Date (If Applicable):  6/9/4574    Next Visit Date:    11/6/2023

## 2023-09-05 NOTE — TELEPHONE ENCOUNTER
Renae Saleh is calling to request a refill on the following medication(s):    Medication Request:  Requested Prescriptions     Pending Prescriptions Disp Refills    Continuous Blood Gluc Sensor (FREESTYLE CHANDLER 2 SENSOR) MISC [Pharmacy Med Name: Lloyd Jaz 2 SENSOR KIT] 2 each 0     Sig: USE AS DIRECTED CHANGING  EVERY  14  DAYS       Last Visit Date (If Applicable):  5/4/7980    Next Visit Date:    11/6/2023

## 2023-10-05 DIAGNOSIS — Z95.820 S/P ANGIOPLASTY WITH STENT: ICD-10-CM

## 2023-10-05 DIAGNOSIS — I10 ESSENTIAL HYPERTENSION: ICD-10-CM

## 2023-10-05 RX ORDER — CARVEDILOL 6.25 MG/1
6.25 TABLET ORAL 2 TIMES DAILY
Qty: 180 TABLET | Refills: 1 | Status: SHIPPED | OUTPATIENT
Start: 2023-10-05

## 2023-10-05 NOTE — TELEPHONE ENCOUNTER
Merle Weiss is calling to request a refill on the following medication(s):    Medication Request:  Requested Prescriptions     Pending Prescriptions Disp Refills    carvedilol (COREG) 6.25 MG tablet [Pharmacy Med Name: Carvedilol 6.25 MG Oral Tablet] 180 tablet 0     Sig: Take 1 tablet by mouth twice daily       Last Visit Date (If Applicable):  9/0/3594    Next Visit Date:    11/6/2023

## 2023-10-06 DIAGNOSIS — E11.8 TYPE 2 DIABETES MELLITUS WITH COMPLICATION, WITHOUT LONG-TERM CURRENT USE OF INSULIN (HCC): ICD-10-CM

## 2023-10-06 NOTE — TELEPHONE ENCOUNTER
Lawanda Britt is calling to request a refill on the following medication(s):    Medication Request:  Requested Prescriptions     Pending Prescriptions Disp Refills    Continuous Blood Gluc Sensor (FREESTYLE CHANDLER 2 SENSOR) MISC [Pharmacy Med Name: James Crouch 2 SENSOR KIT] 2 each 0     Sig: USE AS DIRECTED CHANGING  EVERY  14  DAYS       Last Visit Date (If Applicable):  2/9/1311    Next Visit Date:    11/6/2023

## 2023-11-01 DIAGNOSIS — E11.8 TYPE 2 DIABETES MELLITUS WITH COMPLICATION, WITHOUT LONG-TERM CURRENT USE OF INSULIN (HCC): ICD-10-CM

## 2023-11-01 NOTE — TELEPHONE ENCOUNTER
Berneta Gottron is calling to request a refill on the following medication(s):    Medication Request:  Requested Prescriptions     Pending Prescriptions Disp Refills    Continuous Blood Gluc Sensor (FREESTYLE CHANDLER 2 SENSOR) MISC [Pharmacy Med Name: María Yelenas 2 SENSOR KIT] 2 each 0     Sig: USE AS DIRECTED CHANGING  EVERY  14  DAYS       Last Visit Date (If Applicable):  4/3/5353    Next Visit Date:    11/6/2023

## 2023-11-10 DIAGNOSIS — E11.8 TYPE 2 DIABETES MELLITUS WITH COMPLICATION, WITHOUT LONG-TERM CURRENT USE OF INSULIN (HCC): ICD-10-CM

## 2023-11-10 DIAGNOSIS — K21.9 GASTROESOPHAGEAL REFLUX DISEASE WITHOUT ESOPHAGITIS: ICD-10-CM

## 2023-11-10 RX ORDER — GLIPIZIDE 10 MG/1
10 TABLET ORAL 2 TIMES DAILY
Qty: 180 TABLET | Refills: 0 | Status: SHIPPED | OUTPATIENT
Start: 2023-11-10

## 2023-11-10 RX ORDER — PANTOPRAZOLE SODIUM 20 MG/1
TABLET, DELAYED RELEASE ORAL
Qty: 90 TABLET | Refills: 0 | Status: SHIPPED | OUTPATIENT
Start: 2023-11-10

## 2023-11-10 NOTE — TELEPHONE ENCOUNTER
Al Balderas is calling to request a refill on the following medication(s):    Medication Request:  Requested Prescriptions     Pending Prescriptions Disp Refills    pantoprazole (PROTONIX) 20 MG tablet [Pharmacy Med Name: Pantoprazole Sodium 20 MG Oral Tablet Delayed Release] 90 tablet 0     Sig: Take 1 tablet by mouth once daily    glipiZIDE (GLUCOTROL) 10 MG tablet [Pharmacy Med Name: glipiZIDE 10 MG Oral Tablet] 180 tablet 0     Sig: Take 1 tablet by mouth twice daily       Last Visit Date (If Applicable):  0/1/4294    Next Visit Date:    11/30/2023

## 2023-11-24 DIAGNOSIS — F32.1 CURRENT MODERATE EPISODE OF MAJOR DEPRESSIVE DISORDER WITHOUT PRIOR EPISODE (HCC): ICD-10-CM

## 2023-11-24 RX ORDER — SERTRALINE HYDROCHLORIDE 25 MG/1
TABLET, FILM COATED ORAL
Qty: 90 TABLET | Refills: 1 | Status: SHIPPED | OUTPATIENT
Start: 2023-11-24

## 2023-11-24 NOTE — TELEPHONE ENCOUNTER
Riccardo Villasenor is calling to request a refill on the following medication(s):    Medication Request:  Requested Prescriptions     Pending Prescriptions Disp Refills    sertraline (ZOLOFT) 25 MG tablet [Pharmacy Med Name: Sertraline HCl 25 MG Oral Tablet] 90 tablet 0     Sig: Take 1 tablet by mouth once daily       Last Visit Date (If Applicable):  5/1/2023    Next Visit Date:    11/30/2023

## 2023-11-30 ENCOUNTER — TELEMEDICINE (OUTPATIENT)
Dept: FAMILY MEDICINE CLINIC | Age: 53
End: 2023-11-30

## 2023-11-30 DIAGNOSIS — Z72.0 TOBACCO ABUSE: ICD-10-CM

## 2023-11-30 DIAGNOSIS — I10 ESSENTIAL HYPERTENSION: Primary | ICD-10-CM

## 2023-11-30 DIAGNOSIS — E53.8 B12 DEFICIENCY: ICD-10-CM

## 2023-11-30 DIAGNOSIS — N52.9 ERECTILE DYSFUNCTION, UNSPECIFIED ERECTILE DYSFUNCTION TYPE: ICD-10-CM

## 2023-11-30 DIAGNOSIS — E55.9 VITAMIN D DEFICIENCY: ICD-10-CM

## 2023-11-30 DIAGNOSIS — R22.1 LUMP IN NECK: ICD-10-CM

## 2023-11-30 DIAGNOSIS — E11.8 TYPE 2 DIABETES MELLITUS WITH COMPLICATION, WITHOUT LONG-TERM CURRENT USE OF INSULIN (HCC): ICD-10-CM

## 2023-11-30 DIAGNOSIS — Z12.5 PROSTATE CANCER SCREENING: ICD-10-CM

## 2023-11-30 RX ORDER — SILDENAFIL 50 MG/1
50 TABLET, FILM COATED ORAL DAILY PRN
Qty: 10 TABLET | Refills: 0 | Status: SHIPPED | OUTPATIENT
Start: 2023-11-30

## 2023-11-30 ASSESSMENT — ENCOUNTER SYMPTOMS
COUGH: 0
VOMITING: 0
EYE DISCHARGE: 0
CHEST TIGHTNESS: 0
NAUSEA: 0
CONSTIPATION: 0
ABDOMINAL PAIN: 0
SORE THROAT: 0
WHEEZING: 0
DIARRHEA: 0
SHORTNESS OF BREATH: 0

## 2023-11-30 ASSESSMENT — PATIENT HEALTH QUESTIONNAIRE - PHQ9
10. IF YOU CHECKED OFF ANY PROBLEMS, HOW DIFFICULT HAVE THESE PROBLEMS MADE IT FOR YOU TO DO YOUR WORK, TAKE CARE OF THINGS AT HOME, OR GET ALONG WITH OTHER PEOPLE: 0
8. MOVING OR SPEAKING SO SLOWLY THAT OTHER PEOPLE COULD HAVE NOTICED. OR THE OPPOSITE, BEING SO FIGETY OR RESTLESS THAT YOU HAVE BEEN MOVING AROUND A LOT MORE THAN USUAL: 0
3. TROUBLE FALLING OR STAYING ASLEEP: 0
7. TROUBLE CONCENTRATING ON THINGS, SUCH AS READING THE NEWSPAPER OR WATCHING TELEVISION: 0
SUM OF ALL RESPONSES TO PHQ QUESTIONS 1-9: 1
5. POOR APPETITE OR OVEREATING: 0
4. FEELING TIRED OR HAVING LITTLE ENERGY: 1
2. FEELING DOWN, DEPRESSED OR HOPELESS: 0
9. THOUGHTS THAT YOU WOULD BE BETTER OFF DEAD, OR OF HURTING YOURSELF: 0
6. FEELING BAD ABOUT YOURSELF - OR THAT YOU ARE A FAILURE OR HAVE LET YOURSELF OR YOUR FAMILY DOWN: 0
1. LITTLE INTEREST OR PLEASURE IN DOING THINGS: 0
SUM OF ALL RESPONSES TO PHQ QUESTIONS 1-9: 1
SUM OF ALL RESPONSES TO PHQ QUESTIONS 1-9: 1
SUM OF ALL RESPONSES TO PHQ9 QUESTIONS 1 & 2: 0
SUM OF ALL RESPONSES TO PHQ QUESTIONS 1-9: 1

## 2023-11-30 NOTE — PROGRESS NOTES
Diagnosis Date    Anxiety 10/11/2021    CAD (coronary artery disease)     Chest pain 7/11/2018    Current moderate episode of major depressive disorder without prior episode ([address]) 3/13/2023    Diabetes mellitus ([address])     Essential hypertension 10/15/2018    Gastroesophageal reflux disease without esophagitis     Hypertriglyceridemia 10/15/2018    Type 2 diabetes mellitus with complication, without long-term current use of insulin ([address]) 7/11/2018   ,   Past Surgical History:   Procedure Laterality Date    COLONOSCOPY N/A 6/25/2019    COLORECTAL CANCER SCREENING, NOT HIGH RISK performed by Cherylene Estrin, MD at [facility] N/A 6/25/2019    EGD BIOPSY performed by Cherylene Estrin, MD at [address] [facility]   ,   Social History     Tobacco Use    Smoking status: Every Day     Packs/day: 1.00     Years: 0.00     Additional pack years: 0.00     Total pack years: 0.00     Types: Cigarettes    Smokeless tobacco: Former   Vaping Use    Vaping Use: Never used   Substance Use Topics    Alcohol use: Yes     Comment: few beers ocasionally    Drug use: Not Currently     Comment: past drug abuse, cocaine, crack, marijuana, LSD, PCP--no history of IV drug use   ,   Family History   Problem Relation Age of Onset    Diabetes Father     Cancer Maternal Grandmother     Heart Attack Paternal Grandfather     Breast Cancer Neg Hx     Colon Cancer Neg Hx     Heart Disease Neg Hx     Stroke Neg Hx     Prostate Cancer Neg Hx    ,   Immunization History   Administered Date(s) Administered    Hep B, ENGERIX-B, (age 20y+), IM, 1mL 05/30/2019, 06/24/2019, 10/14/2019    Pneumococcal, PPSV23, PNEUMOVAX 21, (age 2y+), SC/IM, 0.5mL 05/30/2019    TDaP, ADACEL (age 6y-58y), [facility] (age 10y+), IM, 0.5mL 04/05/2021    Zoster Recombinant (Shingrix) 12/30/2020, 12/30/2020   ,   Health Maintenance   Topic Date Due    COVID-19 Vaccine (1) Never done    HIV screen  Never done    Diabetic retinal

## 2023-12-04 ENCOUNTER — HOSPITAL ENCOUNTER (OUTPATIENT)
Dept: ULTRASOUND IMAGING | Age: 53
Discharge: HOME | End: 2023-12-06
Payer: COMMERCIAL

## 2023-12-04 ENCOUNTER — HOSPITAL ENCOUNTER (OUTPATIENT)
Age: 53
Setting detail: SPECIMEN
Discharge: HOME | End: 2023-12-04

## 2023-12-04 DIAGNOSIS — R22.1 LUMP IN NECK: ICD-10-CM

## 2023-12-04 DIAGNOSIS — E11.8 TYPE 2 DIABETES MELLITUS WITH COMPLICATION, WITHOUT LONG-TERM CURRENT USE OF INSULIN (HCC): ICD-10-CM

## 2023-12-04 DIAGNOSIS — I10 ESSENTIAL HYPERTENSION: ICD-10-CM

## 2023-12-04 DIAGNOSIS — Z12.5 PROSTATE CANCER SCREENING: ICD-10-CM

## 2023-12-04 DIAGNOSIS — K11.20 SIALADENITIS: Primary | ICD-10-CM

## 2023-12-04 DIAGNOSIS — E53.8 B12 DEFICIENCY: ICD-10-CM

## 2023-12-04 DIAGNOSIS — E55.9 VITAMIN D DEFICIENCY: ICD-10-CM

## 2023-12-04 LAB
25(OH)D3 SERPL-MCNC: 28.6 NG/ML (ref 30–100)
ALBUMIN SERPL-MCNC: 3.9 G/DL (ref 3.5–5.2)
ALBUMIN/GLOB SERPL: 1 {RATIO} (ref 1–2.5)
ALP SERPL-CCNC: 111 U/L (ref 40–129)
ALT SERPL-CCNC: 37 U/L (ref 10–50)
ANION GAP SERPL CALCULATED.3IONS-SCNC: 12 MMOL/L (ref 9–16)
AST SERPL-CCNC: 28 U/L (ref 10–50)
BASOPHILS # BLD: 0.08 K/UL (ref 0–0.2)
BASOPHILS NFR BLD: 1 % (ref 0–2)
BILIRUB SERPL-MCNC: 0.4 MG/DL (ref 0–1.2)
BUN SERPL-MCNC: 16 MG/DL (ref 6–20)
CALCIUM SERPL-MCNC: 8.9 MG/DL (ref 8.6–10.4)
CHLORIDE SERPL-SCNC: 104 MMOL/L (ref 98–107)
CHOLEST SERPL-MCNC: 163 MG/DL (ref 0–199)
CHOLESTEROL/HDL RATIO: 4
CO2 SERPL-SCNC: 20 MMOL/L (ref 20–31)
CREAT SERPL-MCNC: 0.9 MG/DL (ref 0.7–1.2)
CREAT UR-MCNC: 52.3 MG/DL (ref 39–259)
EOSINOPHIL # BLD: 0.42 K/UL (ref 0–0.44)
EOSINOPHILS RELATIVE PERCENT: 4 % (ref 1–4)
ERYTHROCYTE [DISTWIDTH] IN BLOOD BY AUTOMATED COUNT: 12.6 % (ref 11.8–14.4)
EST. AVERAGE GLUCOSE BLD GHB EST-MCNC: 209 MG/DL
FOLATE SERPL-MCNC: 4.2 NG/ML (ref 4.8–24.2)
GFR SERPL CREATININE-BSD FRML MDRD: >60 ML/MIN/1.73M2
GLUCOSE P FAST SERPL-MCNC: 277 MG/DL (ref 74–99)
HBA1C MFR BLD: 8.9 % (ref 4–6)
HCT VFR BLD AUTO: 43.8 % (ref 40.7–50.3)
HDLC SERPL-MCNC: 38 MG/DL
HGB BLD-MCNC: 14.8 G/DL (ref 13–17)
IMM GRANULOCYTES # BLD AUTO: 0.04 K/UL (ref 0–0.3)
IMM GRANULOCYTES NFR BLD: 0 %
LDLC SERPL CALC-MCNC: 74 MG/DL (ref 0–100)
LYMPHOCYTES NFR BLD: 1.89 K/UL (ref 1.1–3.7)
LYMPHOCYTES RELATIVE PERCENT: 19 % (ref 24–43)
MCH RBC QN AUTO: 33 PG (ref 25.2–33.5)
MCHC RBC AUTO-ENTMCNC: 33.8 G/DL (ref 28.4–34.8)
MCV RBC AUTO: 97.8 FL (ref 82.6–102.9)
MICROALBUMIN UR-MCNC: <12 MG/L
MICROALBUMIN/CREAT UR-RTO: NORMAL MCG/MG CREAT
MONOCYTES NFR BLD: 0.79 K/UL (ref 0.1–1.2)
MONOCYTES NFR BLD: 8 % (ref 3–12)
NEUTROPHILS NFR BLD: 68 % (ref 36–65)
NEUTS SEG NFR BLD: 6.77 K/UL (ref 1.5–8.1)
NRBC BLD-RTO: 0 PER 100 WBC
PLATELET # BLD AUTO: 178 K/UL (ref 138–453)
PMV BLD AUTO: 11.9 FL (ref 8.1–13.5)
POTASSIUM SERPL-SCNC: 4.5 MMOL/L (ref 3.7–5.3)
PROT SERPL-MCNC: 6.9 G/DL (ref 6.6–8.7)
PSA SERPL-MCNC: 0.2 NG/ML (ref 0–4)
RBC # BLD AUTO: 4.48 M/UL (ref 4.21–5.77)
SODIUM SERPL-SCNC: 136 MMOL/L (ref 136–145)
TRIGL SERPL-MCNC: 255 MG/DL (ref 0–149)
TSH SERPL DL<=0.05 MIU/L-ACNC: 2.43 UIU/ML (ref 0.27–4.2)
VIT B12 SERPL-MCNC: 389 PG/ML (ref 232–1245)
VLDLC SERPL CALC-MCNC: 51 MG/DL
WBC OTHER # BLD: 10 K/UL (ref 3.5–11.3)

## 2023-12-04 PROCEDURE — 76536 US EXAM OF HEAD AND NECK: CPT

## 2023-12-04 RX ORDER — AMOXICILLIN 500 MG/1
500 CAPSULE ORAL 3 TIMES DAILY
Qty: 21 CAPSULE | Refills: 0 | Status: SHIPPED | OUTPATIENT
Start: 2023-12-04 | End: 2023-12-11

## 2023-12-06 ENCOUNTER — COMMUNITY OUTREACH (OUTPATIENT)
Age: 53
End: 2023-12-06

## 2024-02-05 DIAGNOSIS — I10 ESSENTIAL HYPERTENSION: ICD-10-CM

## 2024-02-05 DIAGNOSIS — K21.9 GASTROESOPHAGEAL REFLUX DISEASE WITHOUT ESOPHAGITIS: ICD-10-CM

## 2024-02-05 DIAGNOSIS — E11.8 TYPE 2 DIABETES MELLITUS WITH COMPLICATION, WITHOUT LONG-TERM CURRENT USE OF INSULIN (HCC): ICD-10-CM

## 2024-02-05 DIAGNOSIS — Z95.820 S/P ANGIOPLASTY WITH STENT: ICD-10-CM

## 2024-02-05 RX ORDER — FAMOTIDINE 20 MG/1
TABLET, FILM COATED ORAL
Qty: 180 TABLET | Refills: 0 | Status: SHIPPED | OUTPATIENT
Start: 2024-02-05

## 2024-02-05 RX ORDER — PANTOPRAZOLE SODIUM 20 MG/1
TABLET, DELAYED RELEASE ORAL
Qty: 90 TABLET | Refills: 0 | Status: SHIPPED | OUTPATIENT
Start: 2024-02-05

## 2024-02-05 RX ORDER — CARVEDILOL 6.25 MG/1
6.25 TABLET ORAL 2 TIMES DAILY
Qty: 180 TABLET | Refills: 1 | Status: SHIPPED | OUTPATIENT
Start: 2024-02-05

## 2024-02-05 NOTE — TELEPHONE ENCOUNTER
Riccardo Villasenor is calling to request a refill on the following medication(s):    Medication Request:  Requested Prescriptions     Pending Prescriptions Disp Refills    metFORMIN (GLUCOPHAGE) 1000 MG tablet [Pharmacy Med Name: metFORMIN HCl 1000 MG Oral Tablet] 180 tablet 0     Sig: TAKE 1 TABLET BY MOUTH TWICE DAILY WITH MEALS    pantoprazole (PROTONIX) 20 MG tablet [Pharmacy Med Name: Pantoprazole Sodium 20 MG Oral Tablet Delayed Release] 90 tablet 0     Sig: Take 1 tablet by mouth once daily    famotidine (PEPCID) 20 MG tablet [Pharmacy Med Name: Famotidine 20 MG Oral Tablet] 180 tablet 0     Sig: Take 1 tablet by mouth twice daily as needed       Last Visit Date (If Applicable):  11/30/2023    Next Visit Date:    Visit date not found

## 2024-02-06 DIAGNOSIS — E55.9 VITAMIN D DEFICIENCY: ICD-10-CM

## 2024-02-06 DIAGNOSIS — E11.8 TYPE 2 DIABETES MELLITUS WITH COMPLICATION, WITHOUT LONG-TERM CURRENT USE OF INSULIN (HCC): ICD-10-CM

## 2024-02-06 RX ORDER — GLIPIZIDE 10 MG/1
10 TABLET ORAL 2 TIMES DAILY
Qty: 180 TABLET | Refills: 0 | Status: SHIPPED | OUTPATIENT
Start: 2024-02-06

## 2024-02-06 RX ORDER — CHOLECALCIFEROL (VITAMIN D3) 125 MCG
1 CAPSULE ORAL DAILY
Qty: 30 TABLET | Refills: 5 | Status: SHIPPED | OUTPATIENT
Start: 2024-02-06

## 2024-02-06 NOTE — TELEPHONE ENCOUNTER
Riccardo Villasenor is calling to request a refill on the following medication(s):    Medication Request:  Requested Prescriptions     Pending Prescriptions Disp Refills    Cholecalciferol (VITAMIN D3) 50 MCG (2000 UT) TABS 30 tablet 5     Sig: Take 1 tablet by mouth daily       Last Visit Date (If Applicable):  11/30/2023    Next Visit Date:    Visit date not found

## 2024-02-06 NOTE — TELEPHONE ENCOUNTER
Riccardo Villasenor is calling to request a refill on the following medication(s):    Medication Request:  Requested Prescriptions     Pending Prescriptions Disp Refills    glipiZIDE (GLUCOTROL) 10 MG tablet [Pharmacy Med Name: glipiZIDE 10 MG Oral Tablet] 180 tablet 0     Sig: Take 1 tablet by mouth twice daily       Last Visit Date (If Applicable):  11/30/2023    Next Visit Date:    Visit date not found

## 2024-02-22 DIAGNOSIS — E11.8 TYPE 2 DIABETES MELLITUS WITH COMPLICATION, WITHOUT LONG-TERM CURRENT USE OF INSULIN (HCC): ICD-10-CM

## 2024-02-22 NOTE — TELEPHONE ENCOUNTER
Riccardo Villasenor is calling to request a refill on the following medication(s):    Medication Request:  Requested Prescriptions     Pending Prescriptions Disp Refills    Continuous Blood Gluc Sensor (FREESTYLE CHANDLER 2 SENSOR) MISC [Pharmacy Med Name: FREESTYLE CHANDLER 2 SENSOR KIT] 2 each 0     Sig: USE AS DIRECTED CHANGING EVERY 14 DAYS       Last Visit Date (If Applicable):  11/30/2023    Next Visit Date:    Visit date not found

## 2024-03-11 DIAGNOSIS — Z95.820 S/P ANGIOPLASTY WITH STENT: ICD-10-CM

## 2024-03-11 DIAGNOSIS — I10 ESSENTIAL HYPERTENSION: ICD-10-CM

## 2024-03-11 DIAGNOSIS — E11.8 TYPE 2 DIABETES MELLITUS WITH COMPLICATION, WITHOUT LONG-TERM CURRENT USE OF INSULIN (HCC): ICD-10-CM

## 2024-03-11 RX ORDER — CLOPIDOGREL BISULFATE 75 MG/1
TABLET ORAL
Qty: 90 TABLET | Refills: 0 | Status: SHIPPED | OUTPATIENT
Start: 2024-03-11

## 2024-03-11 RX ORDER — HYDROGEN PEROXIDE 2.65 ML/100ML
LIQUID ORAL; TOPICAL
Qty: 90 TABLET | Refills: 0 | Status: SHIPPED | OUTPATIENT
Start: 2024-03-11

## 2024-03-11 RX ORDER — LOSARTAN POTASSIUM 50 MG/1
TABLET ORAL
Qty: 90 TABLET | Refills: 0 | Status: SHIPPED | OUTPATIENT
Start: 2024-03-11

## 2024-03-11 RX ORDER — ROSUVASTATIN CALCIUM 40 MG/1
TABLET, COATED ORAL
Qty: 90 TABLET | Refills: 0 | Status: SHIPPED | OUTPATIENT
Start: 2024-03-11

## 2024-03-11 NOTE — TELEPHONE ENCOUNTER
Riccardo Villasenor is calling to request a refill on the following medication(s):    Medication Request:  Requested Prescriptions     Pending Prescriptions Disp Refills    clopidogrel (PLAVIX) 75 MG tablet [Pharmacy Med Name: Clopidogrel Bisulfate 75 MG Oral Tablet] 90 tablet 0     Sig: Take 1 tablet by mouth once daily    rosuvastatin (CRESTOR) 40 MG tablet [Pharmacy Med Name: Rosuvastatin Calcium 40 MG Oral Tablet] 90 tablet 0     Sig: TAKE 1 TABLET BY MOUTH ONCE DAILY IN THE EVENING    EQ ASPIRIN ADULT LOW DOSE 81 MG EC tablet [Pharmacy Med Name: EQ Aspirin Adult Low Dose 81 MG Oral Tablet Delayed Release] 90 tablet 0     Sig: Take 1 tablet by mouth once daily    losartan (COZAAR) 50 MG tablet [Pharmacy Med Name: Losartan Potassium 50 MG Oral Tablet] 90 tablet 0     Sig: Take 1 tablet by mouth once daily    Continuous Blood Gluc Sensor (FREESTYLE CHANDLER 2 SENSOR) [facility] [Pharmacy Med Name: FREESTYLE CHANDLER 2 SENSOR KIT] 2 each 0     Sig: USE AS DIRECTED CHANGING EVERY 14 DAYS       Last Visit Date (If Applicable):  11/30/2023    Next Visit Date:    Visit date not found

## 2024-05-31 ENCOUNTER — OFFICE VISIT (OUTPATIENT)
Dept: FAMILY MEDICINE CLINIC | Age: 54
End: 2024-05-31
Payer: COMMERCIAL

## 2024-05-31 VITALS
DIASTOLIC BLOOD PRESSURE: 74 MMHG | WEIGHT: 258 LBS | BODY MASS INDEX: 36.94 KG/M2 | OXYGEN SATURATION: 95 % | HEIGHT: 70 IN | HEART RATE: 90 BPM | SYSTOLIC BLOOD PRESSURE: 128 MMHG

## 2024-05-31 DIAGNOSIS — I10 ESSENTIAL HYPERTENSION: Primary | ICD-10-CM

## 2024-05-31 DIAGNOSIS — M77.11 RIGHT TENNIS ELBOW: ICD-10-CM

## 2024-05-31 DIAGNOSIS — E11.8 TYPE 2 DIABETES MELLITUS WITH COMPLICATION, WITHOUT LONG-TERM CURRENT USE OF INSULIN (HCC): ICD-10-CM

## 2024-05-31 DIAGNOSIS — E55.9 VITAMIN D DEFICIENCY: ICD-10-CM

## 2024-05-31 DIAGNOSIS — Z95.820 S/P ANGIOPLASTY WITH STENT: ICD-10-CM

## 2024-05-31 DIAGNOSIS — F32.1 CURRENT MODERATE EPISODE OF MAJOR DEPRESSIVE DISORDER WITHOUT PRIOR EPISODE (HCC): ICD-10-CM

## 2024-05-31 DIAGNOSIS — M25.561 CHRONIC PAIN OF RIGHT KNEE: ICD-10-CM

## 2024-05-31 DIAGNOSIS — G89.29 CHRONIC PAIN OF RIGHT KNEE: ICD-10-CM

## 2024-05-31 DIAGNOSIS — N52.9 ERECTILE DYSFUNCTION, UNSPECIFIED ERECTILE DYSFUNCTION TYPE: ICD-10-CM

## 2024-05-31 DIAGNOSIS — K21.9 GASTROESOPHAGEAL REFLUX DISEASE WITHOUT ESOPHAGITIS: ICD-10-CM

## 2024-05-31 LAB — HBA1C MFR BLD: 9.6 %

## 2024-05-31 PROCEDURE — 3078F DIAST BP <80 MM HG: CPT | Performed by: STUDENT IN AN ORGANIZED HEALTH CARE EDUCATION/TRAINING PROGRAM

## 2024-05-31 PROCEDURE — 99214 OFFICE O/P EST MOD 30 MIN: CPT | Performed by: STUDENT IN AN ORGANIZED HEALTH CARE EDUCATION/TRAINING PROGRAM

## 2024-05-31 PROCEDURE — 83036 HEMOGLOBIN GLYCOSYLATED A1C: CPT | Performed by: STUDENT IN AN ORGANIZED HEALTH CARE EDUCATION/TRAINING PROGRAM

## 2024-05-31 PROCEDURE — 3074F SYST BP LT 130 MM HG: CPT | Performed by: STUDENT IN AN ORGANIZED HEALTH CARE EDUCATION/TRAINING PROGRAM

## 2024-05-31 PROCEDURE — 3046F HEMOGLOBIN A1C LEVEL >9.0%: CPT | Performed by: STUDENT IN AN ORGANIZED HEALTH CARE EDUCATION/TRAINING PROGRAM

## 2024-05-31 RX ORDER — LOSARTAN POTASSIUM 50 MG/1
50 TABLET ORAL DAILY
Qty: 90 TABLET | Refills: 0 | Status: SHIPPED | OUTPATIENT
Start: 2024-05-31

## 2024-05-31 RX ORDER — SERTRALINE HYDROCHLORIDE 25 MG/1
25 TABLET, FILM COATED ORAL DAILY
Qty: 90 TABLET | Refills: 1 | Status: SHIPPED | OUTPATIENT
Start: 2024-05-31

## 2024-05-31 RX ORDER — FAMOTIDINE 20 MG/1
20 TABLET, FILM COATED ORAL 2 TIMES DAILY PRN
Qty: 180 TABLET | Refills: 1 | Status: SHIPPED | OUTPATIENT
Start: 2024-05-31

## 2024-05-31 RX ORDER — ROSUVASTATIN CALCIUM 40 MG/1
40 TABLET, COATED ORAL NIGHTLY
Qty: 90 TABLET | Refills: 1 | Status: SHIPPED | OUTPATIENT
Start: 2024-05-31

## 2024-05-31 RX ORDER — SILDENAFIL 50 MG/1
50 TABLET, FILM COATED ORAL DAILY PRN
Qty: 10 TABLET | Refills: 0 | Status: SHIPPED | OUTPATIENT
Start: 2024-05-31 | End: 2024-05-31

## 2024-05-31 RX ORDER — GLIPIZIDE 10 MG/1
10 TABLET ORAL 2 TIMES DAILY
Qty: 180 TABLET | Refills: 1 | Status: SHIPPED | OUTPATIENT
Start: 2024-05-31

## 2024-05-31 RX ORDER — PANTOPRAZOLE SODIUM 20 MG/1
20 TABLET, DELAYED RELEASE ORAL DAILY
Qty: 90 TABLET | Refills: 1 | Status: SHIPPED | OUTPATIENT
Start: 2024-05-31

## 2024-05-31 RX ORDER — CARVEDILOL 6.25 MG/1
6.25 TABLET ORAL 2 TIMES DAILY
Qty: 180 TABLET | Refills: 1 | Status: SHIPPED | OUTPATIENT
Start: 2024-05-31

## 2024-05-31 RX ORDER — TADALAFIL 10 MG/1
10 TABLET ORAL DAILY PRN
Qty: 30 TABLET | Refills: 1 | Status: SHIPPED | OUTPATIENT
Start: 2024-05-31

## 2024-05-31 RX ORDER — CHOLECALCIFEROL (VITAMIN D3) 125 MCG
1 CAPSULE ORAL DAILY
Qty: 90 TABLET | Refills: 1 | Status: SHIPPED | OUTPATIENT
Start: 2024-05-31

## 2024-05-31 SDOH — ECONOMIC STABILITY: FOOD INSECURITY: WITHIN THE PAST 12 MONTHS, THE FOOD YOU BOUGHT JUST DIDN'T LAST AND YOU DIDN'T HAVE MONEY TO GET MORE.: NEVER TRUE

## 2024-05-31 SDOH — ECONOMIC STABILITY: FOOD INSECURITY: WITHIN THE PAST 12 MONTHS, YOU WORRIED THAT YOUR FOOD WOULD RUN OUT BEFORE YOU GOT MONEY TO BUY MORE.: NEVER TRUE

## 2024-05-31 SDOH — ECONOMIC STABILITY: INCOME INSECURITY: HOW HARD IS IT FOR YOU TO PAY FOR THE VERY BASICS LIKE FOOD, HOUSING, MEDICAL CARE, AND HEATING?: NOT HARD AT ALL

## 2024-05-31 ASSESSMENT — PATIENT HEALTH QUESTIONNAIRE - PHQ9
4. FEELING TIRED OR HAVING LITTLE ENERGY: NOT AT ALL
7. TROUBLE CONCENTRATING ON THINGS, SUCH AS READING THE NEWSPAPER OR WATCHING TELEVISION: NOT AT ALL
SUM OF ALL RESPONSES TO PHQ QUESTIONS 1-9: 0
SUM OF ALL RESPONSES TO PHQ QUESTIONS 1-9: 0
2. FEELING DOWN, DEPRESSED OR HOPELESS: NOT AT ALL
3. TROUBLE FALLING OR STAYING ASLEEP: NOT AT ALL
1. LITTLE INTEREST OR PLEASURE IN DOING THINGS: NOT AT ALL
SUM OF ALL RESPONSES TO PHQ QUESTIONS 1-9: 0
10. IF YOU CHECKED OFF ANY PROBLEMS, HOW DIFFICULT HAVE THESE PROBLEMS MADE IT FOR YOU TO DO YOUR WORK, TAKE CARE OF THINGS AT HOME, OR GET ALONG WITH OTHER PEOPLE: NOT DIFFICULT AT ALL
6. FEELING BAD ABOUT YOURSELF - OR THAT YOU ARE A FAILURE OR HAVE LET YOURSELF OR YOUR FAMILY DOWN: NOT AT ALL
3. TROUBLE FALLING OR STAYING ASLEEP: NOT AT ALL
2. FEELING DOWN, DEPRESSED OR HOPELESS: NOT AT ALL
SUM OF ALL RESPONSES TO PHQ9 QUESTIONS 1 & 2: 0
9. THOUGHTS THAT YOU WOULD BE BETTER OFF DEAD, OR OF HURTING YOURSELF: NOT AT ALL
SUM OF ALL RESPONSES TO PHQ QUESTIONS 1-9: 0
10. IF YOU CHECKED OFF ANY PROBLEMS, HOW DIFFICULT HAVE THESE PROBLEMS MADE IT FOR YOU TO DO YOUR WORK, TAKE CARE OF THINGS AT HOME, OR GET ALONG WITH OTHER PEOPLE: NOT DIFFICULT AT ALL
SUM OF ALL RESPONSES TO PHQ QUESTIONS 1-9: 0
6. FEELING BAD ABOUT YOURSELF - OR THAT YOU ARE A FAILURE OR HAVE LET YOURSELF OR YOUR FAMILY DOWN: NOT AT ALL
4. FEELING TIRED OR HAVING LITTLE ENERGY: NOT AT ALL
7. TROUBLE CONCENTRATING ON THINGS, SUCH AS READING THE NEWSPAPER OR WATCHING TELEVISION: NOT AT ALL
9. THOUGHTS THAT YOU WOULD BE BETTER OFF DEAD, OR OF HURTING YOURSELF: NOT AT ALL
5. POOR APPETITE OR OVEREATING: NOT AT ALL
1. LITTLE INTEREST OR PLEASURE IN DOING THINGS: NOT AT ALL
5. POOR APPETITE OR OVEREATING: NOT AT ALL
8. MOVING OR SPEAKING SO SLOWLY THAT OTHER PEOPLE COULD HAVE NOTICED. OR THE OPPOSITE - BEING SO FIDGETY OR RESTLESS THAT YOU HAVE BEEN MOVING AROUND A LOT MORE THAN USUAL: NOT AT ALL
8. MOVING OR SPEAKING SO SLOWLY THAT OTHER PEOPLE COULD HAVE NOTICED. OR THE OPPOSITE, BEING SO FIGETY OR RESTLESS THAT YOU HAVE BEEN MOVING AROUND A LOT MORE THAN USUAL: NOT AT ALL

## 2024-05-31 NOTE — PROGRESS NOTES
Gastroesophageal reflux disease without esophagitis     Hypertriglyceridemia 10/15/2018    Type 2 diabetes mellitus with complication, without long-term current use of insulin (HCC) 7/11/2018      Past Surgical History:   Procedure Laterality Date    COLONOSCOPY N/A 6/25/2019    COLORECTAL CANCER SCREENING, NOT HIGH RISK performed by Tera Morse MD at [facility] OR    TONSILLECTOMY      UPPER GASTROINTESTINAL ENDOSCOPY N/A 6/25/2019    EGD BIOPSY performed by Tera Morse MD at [facility] OR       Family History   Problem Relation Age of Onset    Diabetes Father     Cancer Maternal Grandmother     Heart Attack Paternal Grandfather     Breast Cancer Neg Hx     Colon Cancer Neg Hx     Heart Disease Neg Hx     Stroke Neg Hx     Prostate Cancer Neg Hx        Social History     Tobacco Use    Smoking status: Every Day     Current packs/day: 1.00     Types: Cigarettes    Smokeless tobacco: Former   Substance Use Topics    Alcohol use: Yes     Comment: few beers ocasionally      Current Outpatient Medications   Medication Sig Dispense Refill    carvedilol (COREG) 6.25 MG tablet Take 1 tablet by mouth 2 times daily 180 tablet 1    Cholecalciferol (VITAMIN D3) 50 MCG (2000 UT) TABS Take 1 tablet by mouth daily 90 tablet 1    famotidine (PEPCID) 20 MG tablet Take 1 tablet by mouth 2 times daily as needed (acid reflux) 180 tablet 1    glipiZIDE (GLUCOTROL) 10 MG tablet Take 1 tablet by mouth 2 times daily 180 tablet 1    losartan (COZAAR) 50 MG tablet Take 1 tablet by mouth daily 90 tablet 0    metFORMIN (GLUCOPHAGE) 1000 MG tablet Take 1 tablet by mouth 2 times daily (with meals) 180 tablet 1    pantoprazole (PROTONIX) 20 MG tablet Take 1 tablet by mouth daily 90 tablet 1    rosuvastatin (CRESTOR) 40 MG tablet Take 1 tablet by mouth nightly 90 tablet 1    sertraline (ZOLOFT) 25 MG tablet Take 1 tablet by mouth daily 90 tablet 1    diclofenac sodium (VOLTAREN) 1 % GEL Apply 2 g topically 4 times daily 100 g 0

## 2024-06-10 DIAGNOSIS — E11.8 TYPE 2 DIABETES MELLITUS WITH COMPLICATION, WITHOUT LONG-TERM CURRENT USE OF INSULIN (HCC): ICD-10-CM

## 2024-06-10 DIAGNOSIS — Z95.820 S/P ANGIOPLASTY WITH STENT: ICD-10-CM

## 2024-06-11 RX ORDER — CLOPIDOGREL BISULFATE 75 MG/1
TABLET ORAL
Qty: 90 TABLET | Refills: 1 | Status: SHIPPED | OUTPATIENT
Start: 2024-06-11

## 2024-06-11 NOTE — TELEPHONE ENCOUNTER
Riccardo Villasenor is calling to request a refill on the following medication(s):    Medication Request:  Requested Prescriptions     Pending Prescriptions Disp Refills    Continuous Glucose Sensor (FREESTYLE CHANDLER 2 SENSOR) [facility] [Pharmacy Med Name: FREESTYLE CHANDLER 2 SENSOR KIT] 2 each 0     Sig: USE AS DIRECTED CHANGING  EVERY  14  DAYS    clopidogrel (PLAVIX) 75 MG tablet [Pharmacy Med Name: Clopidogrel Bisulfate 75 MG Oral Tablet] 90 tablet 0     Sig: Take 1 tablet by mouth once daily       Last Visit Date (If Applicable):  5/31/2024    Next Visit Date:    9/3/2024              f

## 2024-08-29 DIAGNOSIS — E11.8 TYPE 2 DIABETES MELLITUS WITH COMPLICATION, WITHOUT LONG-TERM CURRENT USE OF INSULIN (HCC): ICD-10-CM

## 2024-08-29 NOTE — TELEPHONE ENCOUNTER
Riccardo Villasenor is calling to request a refill on the following medication(s):    Medication Request:  Requested Prescriptions     Pending Prescriptions Disp Refills    Continuous Glucose Sensor (FREESTYLE CHANDLER 2 SENSOR) [facility] [Pharmacy Med Name: FREESTYLE CHANDLER 2 SENSOR KIT] 2 each 0     Sig: USE AS DIRECTED CHANGING  EVERY  14  DAYS       Last Visit Date (If Applicable):  5/31/2024    Next Visit Date:    9/3/2024

## 2024-09-03 ENCOUNTER — OFFICE VISIT (OUTPATIENT)
Dept: FAMILY MEDICINE CLINIC | Age: 54
End: 2024-09-03

## 2024-09-03 VITALS
WEIGHT: 263 LBS | BODY MASS INDEX: 37.65 KG/M2 | HEIGHT: 70 IN | SYSTOLIC BLOOD PRESSURE: 118 MMHG | DIASTOLIC BLOOD PRESSURE: 76 MMHG

## 2024-09-03 DIAGNOSIS — Z95.820 S/P ANGIOPLASTY WITH STENT: ICD-10-CM

## 2024-09-03 DIAGNOSIS — Z72.0 TOBACCO ABUSE: ICD-10-CM

## 2024-09-03 DIAGNOSIS — E53.8 B12 DEFICIENCY: ICD-10-CM

## 2024-09-03 DIAGNOSIS — F32.1 CURRENT MODERATE EPISODE OF MAJOR DEPRESSIVE DISORDER WITHOUT PRIOR EPISODE (HCC): ICD-10-CM

## 2024-09-03 DIAGNOSIS — I10 ESSENTIAL HYPERTENSION: Primary | ICD-10-CM

## 2024-09-03 DIAGNOSIS — Z12.5 PROSTATE CANCER SCREENING: ICD-10-CM

## 2024-09-03 DIAGNOSIS — R07.9 CHEST PAIN, UNSPECIFIED TYPE: ICD-10-CM

## 2024-09-03 DIAGNOSIS — E11.65 UNCONTROLLED TYPE 2 DIABETES MELLITUS WITH HYPERGLYCEMIA (HCC): ICD-10-CM

## 2024-09-03 DIAGNOSIS — E55.9 VITAMIN D DEFICIENCY: ICD-10-CM

## 2024-09-03 RX ORDER — HYDROGEN PEROXIDE 2.65 ML/100ML
LIQUID ORAL; TOPICAL
Qty: 90 TABLET | Refills: 0 | OUTPATIENT
Start: 2024-09-03

## 2024-09-03 RX ORDER — HYDROGEN PEROXIDE 2.65 ML/100ML
81 LIQUID ORAL; TOPICAL DAILY
Qty: 90 TABLET | Refills: 1 | Status: SHIPPED | OUTPATIENT
Start: 2024-09-03

## 2024-09-03 RX ORDER — ACETAMINOPHEN AND CODEINE PHOSPHATE 300; 30 MG/1; MG/1
1 TABLET ORAL EVERY 6 HOURS PRN
COMMUNITY
Start: 2024-06-04

## 2024-09-03 ASSESSMENT — ENCOUNTER SYMPTOMS
DIARRHEA: 0
CONSTIPATION: 0
CHEST TIGHTNESS: 0
SORE THROAT: 0
NAUSEA: 0
ABDOMINAL PAIN: 0
VOMITING: 0
WHEEZING: 0
COUGH: 0
SHORTNESS OF BREATH: 0
EYE DISCHARGE: 0

## 2024-09-03 NOTE — TELEPHONE ENCOUNTER
Riccardo Villasenor is calling to request a refill on the following medication(s):    Medication Request:  Requested Prescriptions     Pending Prescriptions Disp Refills    EQ ASPIRIN ADULT LOW DOSE 81 MG EC tablet [Pharmacy Med Name: EQ Aspirin Adult Low Dose 81 MG Oral Tablet Delayed Release] 90 tablet 0     Sig: Take 1 tablet by mouth once daily       Last Visit Date (If Applicable):  9/3/2024    Next Visit Date:    3/3/2025

## 2024-09-03 NOTE — PROGRESS NOTES
[facility]  [facility]  [address] [place] [address]  [address]  [facility] [place] [address]  Dept: [phone]  Dept Fax: [phone]    Riccardo Villasenor is a 54 y.o. male who is a Established patient, who presents today for his medical conditions/complaints as noted below:  Chief Complaint   Patient presents with    Diabetes     Last A1C 8/6/24: 8.6    Hypertension         HPI:     He is here today to follow-up on hypertension and anxiety.  His blood pressures have been well-controlled on current medications and he states that the Zoloft has been working well for him.  He follows with endocrinology for his diabetes and was recently increased on glipizide and metformin dose and was also put on Rybelsus.  He states that he is a  and does not want to get on insulin if possible.  He complains of having an episode of chest pain on his left side radiating to his left arm and left shoulder.  He has history of NSTEMI s/p angioplasty with stent and has not followed up with cardiology in past couple of years.  He is also due for his labs.  He is also current everyday smoker, not ready to quit yet.        Hemoglobin A1C (%)   Date Value   08/06/2024 8.6   05/31/2024 9.6   12/04/2023 8.9 (H)             ( goal A1Cis < 7)   No components found for: \"LABMICR\"  No components found for: \"LDLCHOLESTEROL\", \"LDLCALC\"    (goal LDL is <100)   AST (U/L)   Date Value   12/04/2023 28     ALT (U/L)   Date Value   12/04/2023 37     BUN (mg/dL)   Date Value   12/04/2023 16     BP Readings from Last 3 Encounters:   09/03/24 118/76   05/31/24 128/74   05/01/23 116/70          (goal 120/80)    Past Medical History:   Diagnosis Date    Anxiety 10/11/2021    CAD (coronary artery disease)     Chest pain 7/11/2018    Current moderate episode of major depressive disorder without prior episode (HCC) 3/13/2023    Diabetes mellitus (HCC)     Essential hypertension 10/15/2018    Gastroesophageal reflux disease without esophagitis

## 2024-10-07 DIAGNOSIS — I10 ESSENTIAL HYPERTENSION: ICD-10-CM

## 2024-10-07 NOTE — TELEPHONE ENCOUNTER
Riccardo Villasenor is calling to request a refill on the following medication(s):    Medication Request:  Requested Prescriptions     Pending Prescriptions Disp Refills    losartan (COZAAR) 50 MG tablet [Pharmacy Med Name: Losartan Potassium 50 MG Oral Tablet] 90 tablet 0     Sig: Take 1 tablet by mouth once daily       Last Visit Date (If Applicable):  9/3/2024    Next Visit Date:    3/3/2025

## 2024-10-08 ENCOUNTER — TELEPHONE (OUTPATIENT)
Age: 54
End: 2024-10-08

## 2024-10-08 RX ORDER — LOSARTAN POTASSIUM 50 MG/1
50 TABLET ORAL DAILY
Qty: 90 TABLET | Refills: 1 | Status: SHIPPED | OUTPATIENT
Start: 2024-10-08

## 2024-10-08 NOTE — TELEPHONE ENCOUNTER
Pre-procedure phone call complete. Questions/concerns addressed. Pt reminded to remain NPO after midnight, no caffeine from now until after test.

## 2024-10-09 ENCOUNTER — HOSPITAL ENCOUNTER (OUTPATIENT)
Dept: NUCLEAR MEDICINE | Age: 54
Discharge: HOME | End: 2024-10-11
Payer: COMMERCIAL

## 2024-10-09 ENCOUNTER — HOSPITAL ENCOUNTER (OUTPATIENT)
Age: 54
Discharge: HOME | End: 2024-10-11
Payer: COMMERCIAL

## 2024-10-09 VITALS — HEART RATE: 101 BPM | DIASTOLIC BLOOD PRESSURE: 87 MMHG | SYSTOLIC BLOOD PRESSURE: 150 MMHG

## 2024-10-09 DIAGNOSIS — R07.9 CHEST PAIN, UNSPECIFIED TYPE: ICD-10-CM

## 2024-10-09 LAB
STRESS BASELINE DIAS BP: 90 MMHG
STRESS BASELINE HR: 84 BPM
STRESS BASELINE SYS BP: 143 MMHG
STRESS ESTIMATED WORKLOAD: 1 METS
STRESS PEAK DIAS BP: 85 MMHG
STRESS PEAK SYS BP: 152 MMHG
STRESS PERCENT HR ACHIEVED: 105 %
STRESS POST PEAK HR: 173 BPM
STRESS RATE PRESSURE PRODUCT: NORMAL BPM*MMHG
STRESS TARGET HR: 164 BPM

## 2024-10-09 PROCEDURE — 2580000003 HC RX 258: Performed by: STUDENT IN AN ORGANIZED HEALTH CARE EDUCATION/TRAINING PROGRAM

## 2024-10-09 PROCEDURE — 93016 CV STRESS TEST SUPVJ ONLY: CPT | Performed by: INTERNAL MEDICINE

## 2024-10-09 PROCEDURE — A9500 TC99M SESTAMIBI: HCPCS | Performed by: STUDENT IN AN ORGANIZED HEALTH CARE EDUCATION/TRAINING PROGRAM

## 2024-10-09 PROCEDURE — 3430000000 HC RX DIAGNOSTIC RADIOPHARMACEUTICAL: Performed by: STUDENT IN AN ORGANIZED HEALTH CARE EDUCATION/TRAINING PROGRAM

## 2024-10-09 PROCEDURE — 93018 CV STRESS TEST I&R ONLY: CPT | Performed by: RADIOLOGY

## 2024-10-09 PROCEDURE — 78452 HT MUSCLE IMAGE SPECT MULT: CPT

## 2024-10-09 PROCEDURE — 6360000002 HC RX W HCPCS: Performed by: STUDENT IN AN ORGANIZED HEALTH CARE EDUCATION/TRAINING PROGRAM

## 2024-10-09 PROCEDURE — 93017 CV STRESS TEST TRACING ONLY: CPT

## 2024-10-09 RX ORDER — REGADENOSON 0.08 MG/ML
0.4 INJECTION, SOLUTION INTRAVENOUS
Status: COMPLETED | OUTPATIENT
Start: 2024-10-09 | End: 2024-10-09

## 2024-10-09 RX ORDER — METOPROLOL TARTRATE 1 MG/ML
5 INJECTION, SOLUTION INTRAVENOUS EVERY 5 MIN PRN
Status: DISCONTINUED | OUTPATIENT
Start: 2024-10-09 | End: 2024-10-09

## 2024-10-09 RX ORDER — TETRAKIS(2-METHOXYISOBUTYLISOCYANIDE)COPPER(I) TETRAFLUOROBORATE 1 MG/ML
10 INJECTION, POWDER, LYOPHILIZED, FOR SOLUTION INTRAVENOUS
Status: DISCONTINUED | OUTPATIENT
Start: 2024-10-09 | End: 2024-10-12 | Stop reason: HOSPADM

## 2024-10-09 RX ORDER — NITROGLYCERIN 0.4 MG/1
0.4 TABLET SUBLINGUAL EVERY 5 MIN PRN
Status: DISCONTINUED | OUTPATIENT
Start: 2024-10-09 | End: 2024-10-09

## 2024-10-09 RX ORDER — SODIUM CHLORIDE 0.9 % (FLUSH) 0.9 %
5-40 SYRINGE (ML) INJECTION PRN
Status: DISCONTINUED | OUTPATIENT
Start: 2024-10-09 | End: 2024-10-09

## 2024-10-09 RX ORDER — TETRAKIS(2-METHOXYISOBUTYLISOCYANIDE)COPPER(I) TETRAFLUOROBORATE 1 MG/ML
30 INJECTION, POWDER, LYOPHILIZED, FOR SOLUTION INTRAVENOUS
Status: DISCONTINUED | OUTPATIENT
Start: 2024-10-09 | End: 2024-10-12 | Stop reason: HOSPADM

## 2024-10-09 RX ORDER — ATROPINE SULFATE 0.1 MG/ML
0.5 INJECTION INTRAVENOUS EVERY 5 MIN PRN
Status: DISCONTINUED | OUTPATIENT
Start: 2024-10-09 | End: 2024-10-09

## 2024-10-09 RX ORDER — AMINOPHYLLINE 25 MG/ML
50 INJECTION, SOLUTION INTRAVENOUS PRN
Status: DISCONTINUED | OUTPATIENT
Start: 2024-10-09 | End: 2024-10-09

## 2024-10-09 RX ORDER — TETRAKIS(2-METHOXYISOBUTYLISOCYANIDE)COPPER(I) TETRAFLUOROBORATE 1 MG/ML
10 INJECTION, POWDER, LYOPHILIZED, FOR SOLUTION INTRAVENOUS
Status: COMPLETED | OUTPATIENT
Start: 2024-10-09 | End: 2024-10-09

## 2024-10-09 RX ORDER — TETRAKIS(2-METHOXYISOBUTYLISOCYANIDE)COPPER(I) TETRAFLUOROBORATE 1 MG/ML
30 INJECTION, POWDER, LYOPHILIZED, FOR SOLUTION INTRAVENOUS
Status: COMPLETED | OUTPATIENT
Start: 2024-10-09 | End: 2024-10-09

## 2024-10-09 RX ORDER — SODIUM CHLORIDE 0.9 % (FLUSH) 0.9 %
10 SYRINGE (ML) INJECTION PRN
Status: DISCONTINUED | OUTPATIENT
Start: 2024-10-09 | End: 2024-10-12 | Stop reason: HOSPADM

## 2024-10-09 RX ORDER — SODIUM CHLORIDE 9 MG/ML
500 INJECTION, SOLUTION INTRAVENOUS CONTINUOUS PRN
Status: DISCONTINUED | OUTPATIENT
Start: 2024-10-09 | End: 2024-10-09

## 2024-10-09 RX ORDER — ALBUTEROL SULFATE 90 UG/1
2 INHALANT RESPIRATORY (INHALATION) PRN
Status: DISCONTINUED | OUTPATIENT
Start: 2024-10-09 | End: 2024-10-09

## 2024-10-09 RX ADMIN — TETRAKIS(2-METHOXYISOBUTYLISOCYANIDE)COPPER(I) TETRAFLUOROBORATE 10 MILLICURIE: 1 INJECTION, POWDER, LYOPHILIZED, FOR SOLUTION INTRAVENOUS at 07:36

## 2024-10-09 RX ADMIN — TETRAKIS(2-METHOXYISOBUTYLISOCYANIDE)COPPER(I) TETRAFLUOROBORATE 41.65 MILLICURIE: 1 INJECTION, POWDER, LYOPHILIZED, FOR SOLUTION INTRAVENOUS at 08:37

## 2024-10-09 RX ADMIN — SODIUM CHLORIDE, PRESERVATIVE FREE 10 ML: 5 INJECTION INTRAVENOUS at 09:48

## 2024-10-09 RX ADMIN — REGADENOSON 0.4 MG: 0.08 INJECTION, SOLUTION INTRAVENOUS at 08:35

## 2024-10-09 NOTE — PROGRESS NOTES
Patient present for Lexiscan stress test. Educated on procedure. All questions/concerns addressed. Allergies and medication reviewed. Patient prepped for procedure. Stress test will be supervised by JANKI Muhammad.

## 2024-10-10 ENCOUNTER — TELEPHONE (OUTPATIENT)
Dept: FAMILY MEDICINE CLINIC | Age: 54
End: 2024-10-10

## 2024-10-10 ENCOUNTER — TELEPHONE (OUTPATIENT)
Age: 54
End: 2024-10-10

## 2024-10-10 NOTE — TELEPHONE ENCOUNTER
Dr. Duque office called to see if we can move his appointment from January to something sooner due to stress test results. Did let staff know with Dr. Neto Byers is out of office until next week, but I will ask Yovana to see if need patient scheduled sooner. I did let there staff know once I talk to Yovana I will call patient get him scheduled. Dr. Duque office understood.

## 2024-10-10 NOTE — TELEPHONE ENCOUNTER
Patient is scheduled on 11/5 due to patient being a . Did let the patient know when on the road if he does not feel good or has issues to go to the ER. Patient understood

## 2024-10-10 NOTE — TELEPHONE ENCOUNTER
UNFORTUNATELY  Doctor Zeeshan had an medical emergency out of town and does not have availability ,but his counter part will look at his results to see if she can see him and move the appointment up . She will have someone from  the cardiology office reach out to him .

## 2024-11-04 DIAGNOSIS — E11.8 TYPE 2 DIABETES MELLITUS WITH COMPLICATION, WITHOUT LONG-TERM CURRENT USE OF INSULIN (HCC): ICD-10-CM

## 2024-11-04 NOTE — TELEPHONE ENCOUNTER
Riccardo Villasenor is calling to request a refill on the following medication(s):    Medication Request:  Requested Prescriptions     Pending Prescriptions Disp Refills    Continuous Glucose Sensor (FREESTYLE CHANDLER 2 SENSOR) [facility] [Pharmacy Med Name: FREESTYLE CHANDLER 2 SENSOR KIT] 2 each 0     Sig: USE AS DIRECTED CHANGING  EVERY  14  DAYS       Last Visit Date (If Applicable):  9/3/2024    Next Visit Date:    3/3/2025

## 2024-11-05 ENCOUNTER — INITIAL CONSULT (OUTPATIENT)
Age: 54
End: 2024-11-05
Payer: COMMERCIAL

## 2024-11-05 VITALS
BODY MASS INDEX: 37.97 KG/M2 | HEART RATE: 91 BPM | OXYGEN SATURATION: 96 % | WEIGHT: 264.6 LBS | SYSTOLIC BLOOD PRESSURE: 134 MMHG | DIASTOLIC BLOOD PRESSURE: 83 MMHG

## 2024-11-05 DIAGNOSIS — Z95.820 S/P ANGIOPLASTY WITH STENT: ICD-10-CM

## 2024-11-05 DIAGNOSIS — Z72.0 TOBACCO ABUSE: ICD-10-CM

## 2024-11-05 DIAGNOSIS — I10 ESSENTIAL HYPERTENSION: ICD-10-CM

## 2024-11-05 DIAGNOSIS — R00.2 PALPITATIONS: ICD-10-CM

## 2024-11-05 DIAGNOSIS — E11.8 TYPE 2 DIABETES MELLITUS WITH COMPLICATION, WITHOUT LONG-TERM CURRENT USE OF INSULIN (HCC): ICD-10-CM

## 2024-11-05 DIAGNOSIS — I21.4 NSTEMI (NON-ST ELEVATED MYOCARDIAL INFARCTION) (HCC): Primary | ICD-10-CM

## 2024-11-05 DIAGNOSIS — R07.9 CHEST PAIN, UNSPECIFIED TYPE: ICD-10-CM

## 2024-11-05 PROCEDURE — 3052F HG A1C>EQUAL 8.0%<EQUAL 9.0%: CPT

## 2024-11-05 PROCEDURE — 99203 OFFICE O/P NEW LOW 30 MIN: CPT

## 2024-11-05 PROCEDURE — 3079F DIAST BP 80-89 MM HG: CPT

## 2024-11-05 PROCEDURE — 93000 ELECTROCARDIOGRAM COMPLETE: CPT

## 2024-11-05 PROCEDURE — 3075F SYST BP GE 130 - 139MM HG: CPT

## 2024-11-05 RX ORDER — NITROGLYCERIN 0.4 MG/1
0.4 TABLET SUBLINGUAL EVERY 5 MIN PRN
Qty: 25 TABLET | Refills: 3 | Status: SHIPPED | OUTPATIENT
Start: 2024-11-05

## 2024-11-05 ASSESSMENT — ENCOUNTER SYMPTOMS: RESPIRATORY NEGATIVE: 1

## 2024-11-05 NOTE — PROGRESS NOTES
Cardiology Office Consultation         Name: Riccardo Villasenor   :  1970       Date: 24    CC: had concerns including New Patient (S/P angioplasty with stent).    HPI  Riccardo Villasenor is here to establish cardiac care. He has a past cardiac history of HTN, HLD, CAD s/p stents, HFrEF. He endorses occasional CP, palpitations, and heart racing. He denies SOB. He has a hisotry of of drug use as a teenager, and is a current smoker daily.    Past Medical:  Past Medical History:   Diagnosis Date    Anxiety 10/11/2021    CAD (coronary artery disease)     Chest pain 2018    Current moderate episode of major depressive disorder without prior episode ([facility]) 3/13/2023    Diabetes mellitus ([facility])     Essential hypertension 10/15/2018    Gastroesophageal reflux disease without esophagitis     Hypertriglyceridemia 10/15/2018    Type 2 diabetes mellitus with complication, without long-term current use of insulin ([facility]) 2018       Past Surgical:  Past Surgical History:   Procedure Laterality Date    COLONOSCOPY N/A 2019    COLORECTAL CANCER SCREENING, NOT HIGH RISK performed by Tera Morse MD at [facility] OR    TONSILLECTOMY      UPPER GASTROINTESTINAL ENDOSCOPY N/A 2019    EGD BIOPSY performed by Tera Morse MD at [facility] OR       Family History:  Family History   Problem Relation Age of Onset    Diabetes Father     Cancer Maternal Grandmother     Heart Attack Paternal Grandfather     Breast Cancer Neg Hx     Colon Cancer Neg Hx     Heart Disease Neg Hx     Stroke Neg Hx     Prostate Cancer Neg Hx        Social History:  Social History     Tobacco Use    Smoking status: Every Day     Current packs/day: 1.00     Types: Cigarettes    Smokeless tobacco: Former   Vaping Use    Vaping status: Never Used   Substance Use Topics    Alcohol use: Yes     Comment: few beers ocasionally    Drug use: Not Currently     Comment: past drug abuse, cocaine, crack, marijuana, LSD, PCP--no

## 2024-12-17 DIAGNOSIS — Z95.820 S/P ANGIOPLASTY WITH STENT: ICD-10-CM

## 2024-12-17 DIAGNOSIS — E11.8 TYPE 2 DIABETES MELLITUS WITH COMPLICATION, WITHOUT LONG-TERM CURRENT USE OF INSULIN (HCC): ICD-10-CM

## 2024-12-17 DIAGNOSIS — K21.9 GASTROESOPHAGEAL REFLUX DISEASE WITHOUT ESOPHAGITIS: ICD-10-CM

## 2024-12-17 DIAGNOSIS — F32.1 CURRENT MODERATE EPISODE OF MAJOR DEPRESSIVE DISORDER WITHOUT PRIOR EPISODE (HCC): ICD-10-CM

## 2024-12-18 RX ORDER — ROSUVASTATIN CALCIUM 40 MG/1
40 TABLET, COATED ORAL NIGHTLY
Qty: 90 TABLET | Refills: 1 | Status: SHIPPED | OUTPATIENT
Start: 2024-12-18

## 2024-12-18 RX ORDER — CLOPIDOGREL BISULFATE 75 MG/1
TABLET ORAL
Qty: 90 TABLET | Refills: 1 | Status: SHIPPED | OUTPATIENT
Start: 2024-12-18

## 2024-12-18 RX ORDER — FAMOTIDINE 20 MG/1
TABLET, FILM COATED ORAL
Qty: 180 TABLET | Refills: 1 | Status: SHIPPED | OUTPATIENT
Start: 2024-12-18

## 2024-12-18 RX ORDER — PANTOPRAZOLE SODIUM 20 MG/1
20 TABLET, DELAYED RELEASE ORAL DAILY
Qty: 90 TABLET | Refills: 1 | Status: SHIPPED | OUTPATIENT
Start: 2024-12-18

## 2024-12-18 RX ORDER — SERTRALINE HYDROCHLORIDE 25 MG/1
25 TABLET, FILM COATED ORAL DAILY
Qty: 90 TABLET | Refills: 1 | Status: SHIPPED | OUTPATIENT
Start: 2024-12-18

## 2024-12-18 NOTE — TELEPHONE ENCOUNTER
Riccardo Villasenor is calling to request a refill on the following medication(s):    Medication Request:  Requested Prescriptions     Pending Prescriptions Disp Refills    clopidogrel (PLAVIX) 75 MG tablet [Pharmacy Med Name: Clopidogrel Bisulfate 75 MG Oral Tablet] 90 tablet 0     Sig: Take 1 tablet by mouth once daily    metFORMIN (GLUCOPHAGE) 1000 MG tablet [Pharmacy Med Name: metFORMIN HCl 1000 MG Oral Tablet] 180 tablet 0     Sig: TAKE 1 TABLET BY MOUTH TWICE DAILY WITH MEALS    pantoprazole (PROTONIX) 20 MG tablet [Pharmacy Med Name: Pantoprazole Sodium 20 MG Oral Tablet Delayed Release] 90 tablet 0     Sig: Take 1 tablet by mouth once daily    famotidine (PEPCID) 20 MG tablet [Pharmacy Med Name: Famotidine 20 MG Oral Tablet] 180 tablet 0     Sig: TAKE 1 TABLET BY MOUTH TWICE DAILY AS NEEDED FOR ACID REFLUX    rosuvastatin (CRESTOR) 40 MG tablet [Pharmacy Med Name: Rosuvastatin Calcium 40 MG Oral Tablet] 90 tablet 0     Sig: Take 1 tablet by mouth nightly    sertraline (ZOLOFT) 25 MG tablet [Pharmacy Med Name: Sertraline HCl 25 MG Oral Tablet] 90 tablet 0     Sig: Take 1 tablet by mouth once daily       Last Visit Date (If Applicable):  9/3/2024    Next Visit Date:    3/3/2025

## 2024-12-27 ENCOUNTER — HOSPITAL ENCOUNTER (OUTPATIENT)
Age: 54
Discharge: HOME | End: 2024-12-29
Payer: COMMERCIAL

## 2024-12-27 VITALS — WEIGHT: 264 LBS | BODY MASS INDEX: 37.8 KG/M2 | HEIGHT: 70 IN

## 2024-12-27 DIAGNOSIS — R00.2 PALPITATIONS: ICD-10-CM

## 2024-12-27 DIAGNOSIS — R07.9 CHEST PAIN, UNSPECIFIED TYPE: ICD-10-CM

## 2024-12-27 LAB
ECHO AO ROOT DIAM: 2.9 CM
ECHO AO ROOT INDEX: 1.23 CM/M2
ECHO AV AREA PEAK VELOCITY: 1.3 CM2
ECHO AV AREA VTI: 1.4 CM2
ECHO AV AREA/BSA PEAK VELOCITY: 0.6 CM2/M2
ECHO AV AREA/BSA VTI: 0.6 CM2/M2
ECHO AV MEAN GRADIENT: 17 MMHG
ECHO AV MEAN VELOCITY: 2 M/S
ECHO AV PEAK GRADIENT: 27 MMHG
ECHO AV PEAK VELOCITY: 2.6 M/S
ECHO AV VELOCITY RATIO: 0.35
ECHO AV VTI: 54 CM
ECHO BSA: 2.43 M2
ECHO IVC EXP: 1.7 CM
ECHO IVC INSP: 0.6 CM
ECHO LA AREA 2C: 16.9 CM2
ECHO LA AREA 4C: 15.8 CM2
ECHO LA DIAMETER INDEX: 1.83 CM/M2
ECHO LA DIAMETER: 4.3 CM
ECHO LA MAJOR AXIS: 4.9 CM
ECHO LA MINOR AXIS: 5.1 CM
ECHO LA TO AORTIC ROOT RATIO: 1.48
ECHO LA VOL BP: 42 ML (ref 18–58)
ECHO LA VOL MOD A2C: 44 ML (ref 18–58)
ECHO LA VOL MOD A4C: 38 ML (ref 18–58)
ECHO LA VOL/BSA BIPLANE: 18 ML/M2 (ref 16–34)
ECHO LA VOLUME INDEX MOD A2C: 19 ML/M2 (ref 16–34)
ECHO LA VOLUME INDEX MOD A4C: 16 ML/M2 (ref 16–34)
ECHO LV E' LATERAL VELOCITY: 10.4 CM/S
ECHO LV E' SEPTAL VELOCITY: 6.2 CM/S
ECHO LV EDV A2C: 95 ML
ECHO LV EDV A4C: 113 ML
ECHO LV EDV INDEX A4C: 48 ML/M2
ECHO LV EDV NDEX A2C: 40 ML/M2
ECHO LV EF PHYSICIAN: 50 %
ECHO LV EJECTION FRACTION A2C: 58 %
ECHO LV EJECTION FRACTION A4C: 50 %
ECHO LV EJECTION FRACTION BIPLANE: 54 % (ref 55–100)
ECHO LV ESV A2C: 40 ML
ECHO LV ESV A4C: 57 ML
ECHO LV ESV INDEX A2C: 17 ML/M2
ECHO LV ESV INDEX A4C: 24 ML/M2
ECHO LV FRACTIONAL SHORTENING: 23 % (ref 28–44)
ECHO LV INTERNAL DIMENSION DIASTOLE INDEX: 2.21 CM/M2
ECHO LV INTERNAL DIMENSION DIASTOLIC: 5.2 CM (ref 4.2–5.9)
ECHO LV INTERNAL DIMENSION SYSTOLIC INDEX: 1.7 CM/M2
ECHO LV INTERNAL DIMENSION SYSTOLIC: 4 CM
ECHO LV IVSD: 1 CM (ref 0.6–1)
ECHO LV MASS 2D: 194.2 G (ref 88–224)
ECHO LV MASS INDEX 2D: 82.6 G/M2 (ref 49–115)
ECHO LV POSTERIOR WALL DIASTOLIC: 1 CM (ref 0.6–1)
ECHO LV RELATIVE WALL THICKNESS RATIO: 0.38
ECHO LVOT AREA: 3.8 CM2
ECHO LVOT AV VTI INDEX: 0.36
ECHO LVOT DIAM: 2.2 CM
ECHO LVOT MEAN GRADIENT: 2 MMHG
ECHO LVOT PEAK GRADIENT: 3 MMHG
ECHO LVOT PEAK VELOCITY: 0.9 M/S
ECHO LVOT STROKE VOLUME INDEX: 31 ML/M2
ECHO LVOT SV: 72.9 ML
ECHO LVOT VTI: 19.2 CM
ECHO MV A VELOCITY: 0.61 M/S
ECHO MV E VELOCITY: 0.5 M/S
ECHO MV E/A RATIO: 0.82
ECHO MV E/E' LATERAL: 4.81
ECHO MV E/E' RATIO (AVERAGED): 6.44
ECHO MV E/E' SEPTAL: 8.06
ECHO RV BASAL DIMENSION: 3.2 CM
ECHO RV FREE WALL PEAK S': 12.4 CM/S

## 2024-12-27 PROCEDURE — 93306 TTE W/DOPPLER COMPLETE: CPT | Performed by: INTERNAL MEDICINE

## 2024-12-27 PROCEDURE — 93306 TTE W/DOPPLER COMPLETE: CPT

## 2024-12-27 PROCEDURE — 93225 XTRNL ECG REC<48 HRS REC: CPT

## 2025-01-02 ENCOUNTER — OFFICE VISIT (OUTPATIENT)
Age: 55
End: 2025-01-02
Payer: COMMERCIAL

## 2025-01-02 VITALS
HEART RATE: 85 BPM | WEIGHT: 262 LBS | HEIGHT: 70 IN | DIASTOLIC BLOOD PRESSURE: 72 MMHG | BODY MASS INDEX: 37.51 KG/M2 | OXYGEN SATURATION: 96 % | SYSTOLIC BLOOD PRESSURE: 123 MMHG

## 2025-01-02 DIAGNOSIS — Z72.0 TOBACCO ABUSE: ICD-10-CM

## 2025-01-02 DIAGNOSIS — I21.4 NSTEMI (NON-ST ELEVATED MYOCARDIAL INFARCTION) (HCC): ICD-10-CM

## 2025-01-02 DIAGNOSIS — R07.9 CHEST PAIN, UNSPECIFIED TYPE: Primary | ICD-10-CM

## 2025-01-02 PROCEDURE — 3074F SYST BP LT 130 MM HG: CPT | Performed by: INTERNAL MEDICINE

## 2025-01-02 PROCEDURE — 99214 OFFICE O/P EST MOD 30 MIN: CPT | Performed by: INTERNAL MEDICINE

## 2025-01-02 PROCEDURE — 3078F DIAST BP <80 MM HG: CPT | Performed by: INTERNAL MEDICINE

## 2025-01-02 PROCEDURE — 93000 ELECTROCARDIOGRAM COMPLETE: CPT | Performed by: INTERNAL MEDICINE

## 2025-01-02 ASSESSMENT — ENCOUNTER SYMPTOMS: RESPIRATORY NEGATIVE: 1

## 2025-01-02 NOTE — PROGRESS NOTES
Positive for palpitations.   Musculoskeletal: Negative.    Skin: Negative.    Neurological: Negative.    Psychiatric/Behavioral: Negative.         Medications:  Current Outpatient Medications   Medication Sig Dispense Refill    clopidogrel (PLAVIX) 75 MG tablet Take 1 tablet by mouth once daily 90 tablet 1    metFORMIN (GLUCOPHAGE) 1000 MG tablet TAKE 1 TABLET BY MOUTH TWICE DAILY WITH MEALS 180 tablet 1    pantoprazole (PROTONIX) 20 MG tablet Take 1 tablet by mouth once daily 90 tablet 1    famotidine (PEPCID) 20 MG tablet TAKE 1 TABLET BY MOUTH TWICE DAILY AS NEEDED FOR ACID REFLUX 180 tablet 1    rosuvastatin (CRESTOR) 40 MG tablet Take 1 tablet by mouth nightly 90 tablet 1    sertraline (ZOLOFT) 25 MG tablet Take 1 tablet by mouth once daily 90 tablet 1    nitroGLYCERIN (NITROSTAT) 0.4 MG SL tablet Place 1 tablet under the tongue every 5 minutes as needed for Chest pain up to max of 3 total doses. If no relief after 1 dose, call 911. 25 tablet 3    Continuous Glucose Sensor (FREESTYLE CHANDLER 2 SENSOR) MISC USE AS DIRECTED CHANGING  EVERY  14  DAYS 2 each 3    losartan (COZAAR) 50 MG tablet Take 1 tablet by mouth once daily 90 tablet 1    acetaminophen-codeine (TYLENOL #3) 300-30 MG per tablet Take 1 tablet by mouth every 6 hours as needed.      EQ ASPIRIN ADULT LOW DOSE 81 MG EC tablet Take 1 tablet by mouth daily 90 tablet 1    carvedilol (COREG) 6.25 MG tablet Take 1 tablet by mouth 2 times daily 180 tablet 1    Cholecalciferol (VITAMIN D3) 50 MCG (2000 UT) TABS Take 1 tablet by mouth daily 90 tablet 1    glipiZIDE (GLUCOTROL) 10 MG tablet Take 1 tablet by mouth 2 times daily 180 tablet 1    tadalafil (CIALIS) 10 MG tablet Take 1 tablet by mouth daily as needed for Erectile Dysfunction 30 tablet 1    empagliflozin (JARDIANCE) 25 MG tablet       Continuous Blood Gluc  (FREESTYLE CHANDLER 2 READER) ANTONIO Use as directed 1 each 1    nitroGLYCERIN (NITROSTAT) 0.4 MG SL tablet Place 0.4 mg under the tongue

## 2025-02-28 SDOH — ECONOMIC STABILITY: FOOD INSECURITY: WITHIN THE PAST 12 MONTHS, YOU WORRIED THAT YOUR FOOD WOULD RUN OUT BEFORE YOU GOT MONEY TO BUY MORE.: NEVER TRUE

## 2025-02-28 SDOH — ECONOMIC STABILITY: INCOME INSECURITY: IN THE LAST 12 MONTHS, WAS THERE A TIME WHEN YOU WERE NOT ABLE TO PAY THE MORTGAGE OR RENT ON TIME?: NO

## 2025-02-28 SDOH — ECONOMIC STABILITY: FOOD INSECURITY: WITHIN THE PAST 12 MONTHS, THE FOOD YOU BOUGHT JUST DIDN'T LAST AND YOU DIDN'T HAVE MONEY TO GET MORE.: NEVER TRUE

## 2025-02-28 ASSESSMENT — PATIENT HEALTH QUESTIONNAIRE - PHQ9
SUM OF ALL RESPONSES TO PHQ QUESTIONS 1-9: 0
9. THOUGHTS THAT YOU WOULD BE BETTER OFF DEAD, OR OF HURTING YOURSELF: NOT AT ALL
4. FEELING TIRED OR HAVING LITTLE ENERGY: NOT AT ALL
7. TROUBLE CONCENTRATING ON THINGS, SUCH AS READING THE NEWSPAPER OR WATCHING TELEVISION: NOT AT ALL
10. IF YOU CHECKED OFF ANY PROBLEMS, HOW DIFFICULT HAVE THESE PROBLEMS MADE IT FOR YOU TO DO YOUR WORK, TAKE CARE OF THINGS AT HOME, OR GET ALONG WITH OTHER PEOPLE: NOT DIFFICULT AT ALL
8. MOVING OR SPEAKING SO SLOWLY THAT OTHER PEOPLE COULD HAVE NOTICED. OR THE OPPOSITE, BEING SO FIGETY OR RESTLESS THAT YOU HAVE BEEN MOVING AROUND A LOT MORE THAN USUAL: NOT AT ALL
9. THOUGHTS THAT YOU WOULD BE BETTER OFF DEAD, OR OF HURTING YOURSELF: NOT AT ALL
5. POOR APPETITE OR OVEREATING: NOT AT ALL
1. LITTLE INTEREST OR PLEASURE IN DOING THINGS: NOT AT ALL
2. FEELING DOWN, DEPRESSED OR HOPELESS: NOT AT ALL
10. IF YOU CHECKED OFF ANY PROBLEMS, HOW DIFFICULT HAVE THESE PROBLEMS MADE IT FOR YOU TO DO YOUR WORK, TAKE CARE OF THINGS AT HOME, OR GET ALONG WITH OTHER PEOPLE: NOT DIFFICULT AT ALL
4. FEELING TIRED OR HAVING LITTLE ENERGY: NOT AT ALL
SUM OF ALL RESPONSES TO PHQ QUESTIONS 1-9: 0
SUM OF ALL RESPONSES TO PHQ QUESTIONS 1-9: 0
3. TROUBLE FALLING OR STAYING ASLEEP: NOT AT ALL
SUM OF ALL RESPONSES TO PHQ QUESTIONS 1-9: 0
1. LITTLE INTEREST OR PLEASURE IN DOING THINGS: NOT AT ALL
8. MOVING OR SPEAKING SO SLOWLY THAT OTHER PEOPLE COULD HAVE NOTICED. OR THE OPPOSITE - BEING SO FIDGETY OR RESTLESS THAT YOU HAVE BEEN MOVING AROUND A LOT MORE THAN USUAL: NOT AT ALL
3. TROUBLE FALLING OR STAYING ASLEEP: NOT AT ALL
7. TROUBLE CONCENTRATING ON THINGS, SUCH AS READING THE NEWSPAPER OR WATCHING TELEVISION: NOT AT ALL
SUM OF ALL RESPONSES TO PHQ QUESTIONS 1-9: 0
5. POOR APPETITE OR OVEREATING: NOT AT ALL
6. FEELING BAD ABOUT YOURSELF - OR THAT YOU ARE A FAILURE OR HAVE LET YOURSELF OR YOUR FAMILY DOWN: NOT AT ALL
6. FEELING BAD ABOUT YOURSELF - OR THAT YOU ARE A FAILURE OR HAVE LET YOURSELF OR YOUR FAMILY DOWN: NOT AT ALL
2. FEELING DOWN, DEPRESSED OR HOPELESS: NOT AT ALL

## 2025-03-03 ENCOUNTER — HOSPITAL ENCOUNTER (OUTPATIENT)
Age: 55
Setting detail: SPECIMEN
Discharge: HOME | End: 2025-03-03

## 2025-03-03 ENCOUNTER — OFFICE VISIT (OUTPATIENT)
Dept: FAMILY MEDICINE CLINIC | Age: 55
End: 2025-03-03

## 2025-03-03 VITALS
OXYGEN SATURATION: 96 % | HEART RATE: 84 BPM | BODY MASS INDEX: 37.37 KG/M2 | WEIGHT: 261 LBS | SYSTOLIC BLOOD PRESSURE: 113 MMHG | DIASTOLIC BLOOD PRESSURE: 75 MMHG | HEIGHT: 70 IN

## 2025-03-03 DIAGNOSIS — E11.8 TYPE 2 DIABETES MELLITUS WITH COMPLICATION, WITHOUT LONG-TERM CURRENT USE OF INSULIN (HCC): ICD-10-CM

## 2025-03-03 DIAGNOSIS — Z95.820 S/P ANGIOPLASTY WITH STENT: ICD-10-CM

## 2025-03-03 DIAGNOSIS — E53.8 B12 DEFICIENCY: ICD-10-CM

## 2025-03-03 DIAGNOSIS — Z72.0 TOBACCO ABUSE: ICD-10-CM

## 2025-03-03 DIAGNOSIS — Z12.5 PROSTATE CANCER SCREENING: ICD-10-CM

## 2025-03-03 DIAGNOSIS — E55.9 VITAMIN D DEFICIENCY: ICD-10-CM

## 2025-03-03 DIAGNOSIS — F32.1 CURRENT MODERATE EPISODE OF MAJOR DEPRESSIVE DISORDER WITHOUT PRIOR EPISODE (HCC): ICD-10-CM

## 2025-03-03 DIAGNOSIS — K21.9 GASTROESOPHAGEAL REFLUX DISEASE WITHOUT ESOPHAGITIS: ICD-10-CM

## 2025-03-03 DIAGNOSIS — M25.552 LEFT HIP PAIN: ICD-10-CM

## 2025-03-03 DIAGNOSIS — I10 ESSENTIAL HYPERTENSION: Primary | ICD-10-CM

## 2025-03-03 DIAGNOSIS — I10 ESSENTIAL HYPERTENSION: ICD-10-CM

## 2025-03-03 LAB
25(OH)D3 SERPL-MCNC: 35.5 NG/ML (ref 30–100)
ALBUMIN SERPL-MCNC: 4.3 G/DL (ref 3.5–5.2)
ALBUMIN/GLOB SERPL: 1.4 {RATIO} (ref 1–2.5)
ALP SERPL-CCNC: 70 U/L (ref 40–129)
ALT SERPL-CCNC: 27 U/L (ref 10–50)
ANION GAP SERPL CALCULATED.3IONS-SCNC: 12 MMOL/L (ref 9–16)
AST SERPL-CCNC: 22 U/L (ref 10–50)
BASOPHILS # BLD: 0.05 K/UL (ref 0–0.2)
BASOPHILS NFR BLD: 1 % (ref 0–2)
BILIRUB SERPL-MCNC: 0.4 MG/DL (ref 0–1.2)
BUN SERPL-MCNC: 13 MG/DL (ref 6–20)
CALCIUM SERPL-MCNC: 9.5 MG/DL (ref 8.6–10.4)
CHLORIDE SERPL-SCNC: 104 MMOL/L (ref 98–107)
CHOLEST SERPL-MCNC: 152 MG/DL (ref 0–199)
CHOLESTEROL/HDL RATIO: 3.6
CO2 SERPL-SCNC: 23 MMOL/L (ref 20–31)
CREAT SERPL-MCNC: 0.9 MG/DL (ref 0.7–1.2)
CREAT UR-MCNC: 59.8 MG/DL (ref 39–259)
EOSINOPHIL # BLD: 0.28 K/UL (ref 0–0.44)
EOSINOPHILS RELATIVE PERCENT: 4 % (ref 1–4)
ERYTHROCYTE [DISTWIDTH] IN BLOOD BY AUTOMATED COUNT: 12.9 % (ref 11.8–14.4)
EST. AVERAGE GLUCOSE BLD GHB EST-MCNC: 209 MG/DL
GFR, ESTIMATED: >90 ML/MIN/1.73M2
GLUCOSE SERPL-MCNC: 195 MG/DL (ref 74–99)
HBA1C MFR BLD: 8.9 % (ref 4–6)
HCT VFR BLD AUTO: 45.7 % (ref 40.7–50.3)
HDLC SERPL-MCNC: 42 MG/DL
HGB BLD-MCNC: 14.7 G/DL (ref 13–17)
IMM GRANULOCYTES # BLD AUTO: 0.04 K/UL (ref 0–0.3)
IMM GRANULOCYTES NFR BLD: 1 %
LDLC SERPL CALC-MCNC: 59 MG/DL (ref 0–100)
LYMPHOCYTES NFR BLD: 1.58 K/UL (ref 1.1–3.7)
LYMPHOCYTES RELATIVE PERCENT: 20 % (ref 24–43)
MCH RBC QN AUTO: 32.3 PG (ref 25.2–33.5)
MCHC RBC AUTO-ENTMCNC: 32.2 G/DL (ref 28.4–34.8)
MCV RBC AUTO: 100.4 FL (ref 82.6–102.9)
MICROALBUMIN UR-MCNC: <12 MG/L (ref 0–20)
MICROALBUMIN/CREAT UR-RTO: NORMAL MCG/MG CREAT (ref 0–17)
MONOCYTES NFR BLD: 0.55 K/UL (ref 0.1–1.2)
MONOCYTES NFR BLD: 7 % (ref 3–12)
NEUTROPHILS NFR BLD: 67 % (ref 36–65)
NEUTS SEG NFR BLD: 5.58 K/UL (ref 1.5–8.1)
NRBC BLD-RTO: 0 PER 100 WBC
PLATELET # BLD AUTO: 179 K/UL (ref 138–453)
PMV BLD AUTO: 11.5 FL (ref 8.1–13.5)
POTASSIUM SERPL-SCNC: 4.4 MMOL/L (ref 3.7–5.3)
PROT SERPL-MCNC: 7.3 G/DL (ref 6.6–8.7)
PSA SERPL-MCNC: 0.24 NG/ML (ref 0–4)
RBC # BLD AUTO: 4.55 M/UL (ref 4.21–5.77)
SODIUM SERPL-SCNC: 139 MMOL/L (ref 136–145)
TRIGL SERPL-MCNC: 256 MG/DL
TSH SERPL DL<=0.05 MIU/L-ACNC: 2.06 UIU/ML (ref 0.27–4.2)
VIT B12 SERPL-MCNC: 419 PG/ML (ref 232–1245)
VLDLC SERPL CALC-MCNC: 51 MG/DL (ref 1–30)
WBC OTHER # BLD: 8.1 K/UL (ref 3.5–11.3)

## 2025-03-03 RX ORDER — LOSARTAN POTASSIUM 50 MG/1
50 TABLET ORAL DAILY
Qty: 90 TABLET | Refills: 1 | Status: SHIPPED | OUTPATIENT
Start: 2025-03-03

## 2025-03-03 RX ORDER — FAMOTIDINE 20 MG/1
20 TABLET, FILM COATED ORAL 2 TIMES DAILY
Qty: 180 TABLET | Refills: 1 | Status: SHIPPED | OUTPATIENT
Start: 2025-03-03

## 2025-03-03 RX ORDER — CHOLECALCIFEROL (VITAMIN D3) 50 MCG
1 TABLET ORAL DAILY
Qty: 90 TABLET | Refills: 1 | Status: SHIPPED | OUTPATIENT
Start: 2025-03-03

## 2025-03-03 RX ORDER — ROSUVASTATIN CALCIUM 40 MG/1
40 TABLET, COATED ORAL NIGHTLY
Qty: 90 TABLET | Refills: 1 | Status: SHIPPED | OUTPATIENT
Start: 2025-03-03

## 2025-03-03 RX ORDER — CLOPIDOGREL BISULFATE 75 MG/1
75 TABLET ORAL DAILY
Qty: 90 TABLET | Refills: 1 | Status: SHIPPED | OUTPATIENT
Start: 2025-03-03

## 2025-03-03 RX ORDER — SERTRALINE HYDROCHLORIDE 25 MG/1
25 TABLET, FILM COATED ORAL DAILY
Qty: 90 TABLET | Refills: 1 | Status: SHIPPED | OUTPATIENT
Start: 2025-03-03

## 2025-03-03 RX ORDER — PANTOPRAZOLE SODIUM 20 MG/1
20 TABLET, DELAYED RELEASE ORAL DAILY
Qty: 90 TABLET | Refills: 1 | Status: SHIPPED | OUTPATIENT
Start: 2025-03-03

## 2025-03-03 RX ORDER — GLIPIZIDE 10 MG/1
10 TABLET ORAL 2 TIMES DAILY
Qty: 180 TABLET | Refills: 1 | Status: SHIPPED | OUTPATIENT
Start: 2025-03-03

## 2025-03-03 RX ORDER — CARVEDILOL 6.25 MG/1
6.25 TABLET ORAL 2 TIMES DAILY
Qty: 180 TABLET | Refills: 1 | Status: SHIPPED | OUTPATIENT
Start: 2025-03-03

## 2025-03-03 ASSESSMENT — ENCOUNTER SYMPTOMS
SHORTNESS OF BREATH: 0
CHEST TIGHTNESS: 0
NAUSEA: 0
CONSTIPATION: 0
EYE DISCHARGE: 0
DIARRHEA: 0
ABDOMINAL PAIN: 0
WHEEZING: 0
VOMITING: 0
SORE THROAT: 0
COUGH: 0

## 2025-03-03 NOTE — PROGRESS NOTES
Refill:  1    Cholecalciferol (VITAMIN D3) 50 MCG (2000 UT) TABS     Sig: Take 1 tablet by mouth daily     Dispense:  90 tablet     Refill:  1    clopidogrel (PLAVIX) 75 MG tablet     Sig: Take 1 tablet by mouth daily     Dispense:  90 tablet     Refill:  1    famotidine (PEPCID) 20 MG tablet     Sig: Take 1 tablet by mouth 2 times daily     Dispense:  180 tablet     Refill:  1    glipiZIDE (GLUCOTROL) 10 MG tablet     Sig: Take 1 tablet by mouth 2 times daily     Dispense:  180 tablet     Refill:  1    losartan (COZAAR) 50 MG tablet     Sig: Take 1 tablet by mouth daily     Dispense:  90 tablet     Refill:  1    metFORMIN (GLUCOPHAGE) 1000 MG tablet     Sig: Take 1 tablet by mouth 2 times daily (with meals)     Dispense:  180 tablet     Refill:  1    pantoprazole (PROTONIX) 20 MG tablet     Sig: Take 1 tablet by mouth daily     Dispense:  90 tablet     Refill:  1    rosuvastatin (CRESTOR) 40 MG tablet     Sig: Take 1 tablet by mouth nightly     Dispense:  90 tablet     Refill:  1    sertraline (ZOLOFT) 25 MG tablet     Sig: Take 1 tablet by mouth daily     Dispense:  90 tablet     Refill:  1       Patient given educational materials - see patient instructions.Discussed use, benefit, and side effects of prescribed medications.  All patientquestions answered. Pt voiced understanding. Reviewed health maintenance.  Instructedto continue current medications, diet and exercise.  Patient agreed with treatmentplan. Follow up as directed.     Electronically signed by Tiarra Duque MD on 3/3/2025 at 12:09 PM    The patient (or guardian, if applicable) and other individuals in attendance with the patient were advised that Artificial Intelligence will be utilized during this visit to record, process the conversation to generate a clinical note, and support improvement of the AI technology. The patient (or guardian, if applicable) and other individuals in attendance at the appointment consented to the use of AI, including the

## 2025-04-04 DIAGNOSIS — M25.552 LEFT HIP PAIN: Primary | ICD-10-CM

## 2025-04-07 ENCOUNTER — HOSPITAL ENCOUNTER (OUTPATIENT)
Age: 55
Discharge: HOME | End: 2025-04-09
Payer: COMMERCIAL

## 2025-04-07 ENCOUNTER — TELEPHONE (OUTPATIENT)
Age: 55
End: 2025-04-07

## 2025-04-07 PROCEDURE — 73502 X-RAY EXAM HIP UNI 2-3 VIEWS: CPT

## 2025-04-07 NOTE — TELEPHONE ENCOUNTER
Phoned patient in regards to getting x-ray taken prior to appointment with provider. Instructed to get x-ray within an hour prior to appointment at: [address] -- The sign by the road states \"Lab and imaging services\". Order for x-ray has already been placed in system.

## 2025-04-08 ENCOUNTER — OFFICE VISIT (OUTPATIENT)
Age: 55
End: 2025-04-08
Payer: COMMERCIAL

## 2025-04-08 VITALS — HEIGHT: 70 IN | WEIGHT: 261 LBS | BODY MASS INDEX: 37.37 KG/M2

## 2025-04-08 DIAGNOSIS — M25.552 PAIN OF LEFT HIP: Primary | ICD-10-CM

## 2025-04-08 PROCEDURE — 99204 OFFICE O/P NEW MOD 45 MIN: CPT | Performed by: ORTHOPAEDIC SURGERY

## 2025-04-08 NOTE — PROGRESS NOTES
[facility]      [facility]OP[facility]ED[facility] SPOR[facility] MEDICINE  [facility]  [place] [address]  Dept: [phone]  Dept Fax: [phone]    Chief Compliant:  Chief Complaint   Patient presents with    Hip Pain     Left hip pain        History of Present Illness:  This is a 55 y.o. male who presents to the clinic today for evaluation / follow up of left hip pain.  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  He is here today for further evaluation.  States after walk around the grocery store several miles he will get pain over the lateral aspect of the left hip.  Denies any groin pain or buttock pain or back pain.  He is here today for further evaluation.  Really has not had treatment for it thus far.  Been going on for about 6 months.       Physical Exam:    On exam today he has some mild tenderness over the greater trochanteric bursa area.  He walks with no significant limp.  Normal gait pattern.  He has intact hip abductor strength with no real discomfort against resistance.  He has no pain in the groin with range of motion of the hip he has 5-5 hip flexion strength with no discomfort reproduced with resistance.  There is no hip instability.    Nursing note and vitals reviewed.     Labs and Imaging: X-rays of the left hip show that there is some minor arthritic changes with superior acetabular osteophyte maybe some minor central inferior joint space narrowing.  No acute process.    XR taken today:  No results found.      No orders of the defined types were placed in this encounter.      Assessment and Plan:  No diagnosis found.      This is a 55 y.o. male with left hip greater trochanteric bursitis or hip abductor tendinitis.  I explained him this diagnosis.  I think he would benefit from strengthening.  We talked about how when he is in a seated position

## 2025-05-02 DIAGNOSIS — Z95.820 S/P ANGIOPLASTY WITH STENT: ICD-10-CM

## 2025-05-02 DIAGNOSIS — E11.8 TYPE 2 DIABETES MELLITUS WITH COMPLICATION, WITHOUT LONG-TERM CURRENT USE OF INSULIN (HCC): ICD-10-CM

## 2025-05-02 RX ORDER — HYDROGEN PEROXIDE 2.65 ML/100ML
81 LIQUID ORAL; TOPICAL DAILY
Qty: 90 TABLET | Refills: 0 | Status: SHIPPED | OUTPATIENT
Start: 2025-05-02

## 2025-05-02 NOTE — TELEPHONE ENCOUNTER
Riccardo Villasenor is calling to request a refill on the following medication(s):    Medication Request:  Requested Prescriptions     Pending Prescriptions Disp Refills    EQ ASPIRIN ADULT LOW DOSE 81 MG EC tablet [Pharmacy Med Name: EQ Aspirin Adult Low Dose 81 MG Oral Tablet Delayed Release] 90 tablet 0     Sig: Take 1 tablet by mouth once daily    Continuous Glucose Sensor (FREESTYLE CHANDLER 2 SENSOR) [facility] [Pharmacy Med Name: FREESTYLE CHANDLER 2 SEN 14D KIT] 2 each 0     Sig: USE AS DIRECTED CHANGING  EVERY  14  DAYS       Last Visit Date (If Applicable):  3/3/2025    Next Visit Date:    9/8/2025

## 2025-06-03 DIAGNOSIS — E11.8 TYPE 2 DIABETES MELLITUS WITH COMPLICATION, WITHOUT LONG-TERM CURRENT USE OF INSULIN (HCC): ICD-10-CM

## 2025-06-03 NOTE — TELEPHONE ENCOUNTER
Riccardo Villasenor is calling to request a refill on the following medication(s):    Medication Request:  Requested Prescriptions     Pending Prescriptions Disp Refills    Continuous Glucose Sensor (FREESTYLE CHANDLER 2 SENSOR) [facility] [Pharmacy Med Name: FREESTYLE CHANDLER 2 SEN 14D KIT] 2 each 0     Sig: USE AS DIRECTED CHANGING  EVERY  14  DAYS       Last Visit Date (If Applicable):  3/3/2025    Next Visit Date:    9/8/2025

## 2025-08-02 DIAGNOSIS — E11.8 TYPE 2 DIABETES MELLITUS WITH COMPLICATION, WITHOUT LONG-TERM CURRENT USE OF INSULIN (HCC): ICD-10-CM

## 2025-08-02 DIAGNOSIS — Z95.820 S/P ANGIOPLASTY WITH STENT: ICD-10-CM

## 2025-08-04 RX ORDER — HYDROGEN PEROXIDE 2.65 ML/100ML
81 LIQUID ORAL; TOPICAL DAILY
Qty: 90 TABLET | Refills: 0 | Status: SHIPPED | OUTPATIENT
Start: 2025-08-04

## (undated) DEVICE — 35 ML SYRINGE LUER-LOCK TIP: Brand: MONOJECT

## (undated) DEVICE — YANKAUER,FLEXIBLE HANDLE,REGLR CAPACITY: Brand: MEDLINE INDUSTRIES, INC.

## (undated) DEVICE — TUBING, SUCTION, 3/16" X 10', STRAIGHT: Brand: MEDLINE

## (undated) DEVICE — GAUZE,SPONGE,3"X3",4PLY,NS,LF: Brand: MEDLINE

## (undated) DEVICE — SOLUTION IV IRRIG 1000ML POUR BTL 2F7114

## (undated) DEVICE — FORCEPS BX L240CM JAW DIA2.4MM ORNG L CAP W/ NDL DISP RAD

## (undated) DEVICE — GLOVE ORANGE PI 7 1/2   MSG9075

## (undated) DEVICE — SYRINGE MED 50ML LUERLOCK TIP